# Patient Record
Sex: FEMALE | Race: WHITE | NOT HISPANIC OR LATINO | Employment: FULL TIME | URBAN - METROPOLITAN AREA
[De-identification: names, ages, dates, MRNs, and addresses within clinical notes are randomized per-mention and may not be internally consistent; named-entity substitution may affect disease eponyms.]

---

## 2017-02-24 ENCOUNTER — ALLSCRIPTS OFFICE VISIT (OUTPATIENT)
Dept: OTHER | Facility: OTHER | Age: 57
End: 2017-02-24

## 2017-02-24 LAB
FLUAV AG SPEC QL IA: POSITIVE
INFLUENZA B AG (HISTORICAL): NEGATIVE

## 2017-03-29 ENCOUNTER — LAB REQUISITION (OUTPATIENT)
Dept: LAB | Facility: HOSPITAL | Age: 57
End: 2017-03-29
Payer: COMMERCIAL

## 2017-03-29 DIAGNOSIS — Z11.51 ENCOUNTER FOR SCREENING FOR HUMAN PAPILLOMAVIRUS (HPV): ICD-10-CM

## 2017-03-29 DIAGNOSIS — Z01.419 ENCOUNTER FOR GYNECOLOGICAL EXAMINATION WITHOUT ABNORMAL FINDING: ICD-10-CM

## 2017-03-29 PROCEDURE — G0145 SCR C/V CYTO,THINLAYER,RESCR: HCPCS | Performed by: OBSTETRICS & GYNECOLOGY

## 2017-04-04 LAB
LAB AP GYN PRIMARY INTERPRETATION: NORMAL
Lab: NORMAL
PATH INTERP SPEC-IMP: NORMAL

## 2017-08-09 ENCOUNTER — ALLSCRIPTS OFFICE VISIT (OUTPATIENT)
Dept: OTHER | Facility: OTHER | Age: 57
End: 2017-08-09

## 2017-08-09 DIAGNOSIS — M72.2 PLANTAR FASCIAL FIBROMATOSIS: ICD-10-CM

## 2017-08-09 DIAGNOSIS — E78.00 PURE HYPERCHOLESTEROLEMIA: ICD-10-CM

## 2017-08-10 ENCOUNTER — APPOINTMENT (OUTPATIENT)
Dept: LAB | Facility: CLINIC | Age: 57
End: 2017-08-10
Payer: COMMERCIAL

## 2017-08-10 DIAGNOSIS — M72.2 PLANTAR FASCIAL FIBROMATOSIS: ICD-10-CM

## 2017-08-10 DIAGNOSIS — E78.00 PURE HYPERCHOLESTEROLEMIA: ICD-10-CM

## 2017-08-10 LAB
25(OH)D3 SERPL-MCNC: 29.2 NG/ML (ref 30–100)
ALBUMIN SERPL BCP-MCNC: 3.6 G/DL (ref 3.5–5)
ALP SERPL-CCNC: 72 U/L (ref 46–116)
ALT SERPL W P-5'-P-CCNC: 89 U/L (ref 12–78)
ANION GAP SERPL CALCULATED.3IONS-SCNC: 7 MMOL/L (ref 4–13)
AST SERPL W P-5'-P-CCNC: 64 U/L (ref 5–45)
BASOPHILS # BLD AUTO: 0.04 THOUSANDS/ΜL (ref 0–0.1)
BASOPHILS NFR BLD AUTO: 1 % (ref 0–1)
BILIRUB SERPL-MCNC: 0.73 MG/DL (ref 0.2–1)
BUN SERPL-MCNC: 16 MG/DL (ref 5–25)
CALCIUM SERPL-MCNC: 9.5 MG/DL (ref 8.3–10.1)
CHLORIDE SERPL-SCNC: 104 MMOL/L (ref 100–108)
CHOLEST SERPL-MCNC: 249 MG/DL (ref 50–200)
CO2 SERPL-SCNC: 28 MMOL/L (ref 21–32)
CREAT SERPL-MCNC: 0.88 MG/DL (ref 0.6–1.3)
EOSINOPHIL # BLD AUTO: 0.77 THOUSAND/ΜL (ref 0–0.61)
EOSINOPHIL NFR BLD AUTO: 12 % (ref 0–6)
ERYTHROCYTE [DISTWIDTH] IN BLOOD BY AUTOMATED COUNT: 15.3 % (ref 11.6–15.1)
ERYTHROCYTE [SEDIMENTATION RATE] IN BLOOD: 5 MM/HOUR (ref 0–20)
GFR SERPL CREATININE-BSD FRML MDRD: 75 ML/MIN/1.73SQ M
GLUCOSE P FAST SERPL-MCNC: 100 MG/DL (ref 65–99)
HCT VFR BLD AUTO: 42.5 % (ref 34.8–46.1)
HDLC SERPL-MCNC: 89 MG/DL (ref 40–60)
HGB BLD-MCNC: 14 G/DL (ref 11.5–15.4)
LDLC SERPL CALC-MCNC: 142 MG/DL (ref 0–100)
LYMPHOCYTES # BLD AUTO: 1.71 THOUSANDS/ΜL (ref 0.6–4.47)
LYMPHOCYTES NFR BLD AUTO: 26 % (ref 14–44)
MCH RBC QN AUTO: 28.9 PG (ref 26.8–34.3)
MCHC RBC AUTO-ENTMCNC: 32.9 G/DL (ref 31.4–37.4)
MCV RBC AUTO: 88 FL (ref 82–98)
MONOCYTES # BLD AUTO: 0.63 THOUSAND/ΜL (ref 0.17–1.22)
MONOCYTES NFR BLD AUTO: 10 % (ref 4–12)
NEUTROPHILS # BLD AUTO: 3.47 THOUSANDS/ΜL (ref 1.85–7.62)
NEUTS SEG NFR BLD AUTO: 51 % (ref 43–75)
NRBC BLD AUTO-RTO: 0 /100 WBCS
PLATELET # BLD AUTO: 248 THOUSANDS/UL (ref 149–390)
PMV BLD AUTO: 11.3 FL (ref 8.9–12.7)
POTASSIUM SERPL-SCNC: 4.7 MMOL/L (ref 3.5–5.3)
PROT SERPL-MCNC: 7.2 G/DL (ref 6.4–8.2)
RBC # BLD AUTO: 4.84 MILLION/UL (ref 3.81–5.12)
SODIUM SERPL-SCNC: 139 MMOL/L (ref 136–145)
TRIGL SERPL-MCNC: 91 MG/DL
TSH SERPL DL<=0.05 MIU/L-ACNC: 1.98 UIU/ML (ref 0.36–3.74)
WBC # BLD AUTO: 6.64 THOUSAND/UL (ref 4.31–10.16)

## 2017-08-10 PROCEDURE — 86376 MICROSOMAL ANTIBODY EACH: CPT

## 2017-08-10 PROCEDURE — 86618 LYME DISEASE ANTIBODY: CPT

## 2017-08-10 PROCEDURE — 86800 THYROGLOBULIN ANTIBODY: CPT

## 2017-08-10 PROCEDURE — 80053 COMPREHEN METABOLIC PANEL: CPT

## 2017-08-10 PROCEDURE — 36415 COLL VENOUS BLD VENIPUNCTURE: CPT

## 2017-08-10 PROCEDURE — 84443 ASSAY THYROID STIM HORMONE: CPT

## 2017-08-10 PROCEDURE — 82306 VITAMIN D 25 HYDROXY: CPT

## 2017-08-10 PROCEDURE — 85025 COMPLETE CBC W/AUTO DIFF WBC: CPT

## 2017-08-10 PROCEDURE — 80061 LIPID PANEL: CPT

## 2017-08-10 PROCEDURE — 85652 RBC SED RATE AUTOMATED: CPT

## 2017-08-11 LAB
B BURGDOR IGG SER IA-ACNC: 0.2
B BURGDOR IGM SER IA-ACNC: 0.26
THYROGLOB AB SERPL-ACNC: <1 IU/ML (ref 0–0.9)
THYROPEROXIDASE AB SERPL-ACNC: 24 IU/ML (ref 0–34)

## 2017-08-14 ENCOUNTER — GENERIC CONVERSION - ENCOUNTER (OUTPATIENT)
Dept: OTHER | Facility: OTHER | Age: 57
End: 2017-08-14

## 2017-08-17 ENCOUNTER — APPOINTMENT (OUTPATIENT)
Dept: LAB | Facility: CLINIC | Age: 57
End: 2017-08-17
Payer: COMMERCIAL

## 2017-08-17 DIAGNOSIS — R74.8 ABNORMAL LEVELS OF OTHER SERUM ENZYMES: ICD-10-CM

## 2017-08-17 LAB
ALBUMIN SERPL BCP-MCNC: 3.5 G/DL (ref 3.5–5)
ALP SERPL-CCNC: 75 U/L (ref 46–116)
ALT SERPL W P-5'-P-CCNC: 56 U/L (ref 12–78)
ANION GAP SERPL CALCULATED.3IONS-SCNC: 6 MMOL/L (ref 4–13)
AST SERPL W P-5'-P-CCNC: 29 U/L (ref 5–45)
BILIRUB SERPL-MCNC: 0.46 MG/DL (ref 0.2–1)
BUN SERPL-MCNC: 18 MG/DL (ref 5–25)
CALCIUM SERPL-MCNC: 9.2 MG/DL (ref 8.3–10.1)
CHLORIDE SERPL-SCNC: 107 MMOL/L (ref 100–108)
CO2 SERPL-SCNC: 27 MMOL/L (ref 21–32)
CREAT SERPL-MCNC: 0.88 MG/DL (ref 0.6–1.3)
GFR SERPL CREATININE-BSD FRML MDRD: 75 ML/MIN/1.73SQ M
GLUCOSE P FAST SERPL-MCNC: 97 MG/DL (ref 65–99)
POTASSIUM SERPL-SCNC: 4.4 MMOL/L (ref 3.5–5.3)
PROT SERPL-MCNC: 7 G/DL (ref 6.4–8.2)
SODIUM SERPL-SCNC: 140 MMOL/L (ref 136–145)

## 2017-08-17 PROCEDURE — 36415 COLL VENOUS BLD VENIPUNCTURE: CPT

## 2017-08-17 PROCEDURE — 80053 COMPREHEN METABOLIC PANEL: CPT

## 2017-08-18 ENCOUNTER — GENERIC CONVERSION - ENCOUNTER (OUTPATIENT)
Dept: OTHER | Facility: OTHER | Age: 57
End: 2017-08-18

## 2017-09-05 ENCOUNTER — TRANSCRIBE ORDERS (OUTPATIENT)
Dept: ADMINISTRATIVE | Facility: HOSPITAL | Age: 57
End: 2017-09-05

## 2017-09-05 DIAGNOSIS — Z12.31 ENCOUNTER FOR MAMMOGRAM TO ESTABLISH BASELINE MAMMOGRAM: Primary | ICD-10-CM

## 2017-09-21 ENCOUNTER — HOSPITAL ENCOUNTER (OUTPATIENT)
Dept: RADIOLOGY | Age: 57
Discharge: HOME | End: 2017-09-21
Payer: COMMERCIAL

## 2017-09-21 DIAGNOSIS — Z12.31 ENCOUNTER FOR MAMMOGRAM TO ESTABLISH BASELINE MAMMOGRAM: ICD-10-CM

## 2017-09-21 PROCEDURE — G0202 SCR MAMMO BI INCL CAD: HCPCS

## 2017-09-28 DIAGNOSIS — R74.8 ABNORMAL LEVELS OF OTHER SERUM ENZYMES: ICD-10-CM

## 2017-11-24 ENCOUNTER — ALLSCRIPTS OFFICE VISIT (OUTPATIENT)
Dept: OTHER | Facility: OTHER | Age: 57
End: 2017-11-24

## 2017-11-25 NOTE — PROGRESS NOTES
Assessment    1  Right rotator cuff tendinitis (726 10) (C70 73)    Plan  Right rotator cuff tendinitis    · Naproxen 500 MG Oral Tablet; TAKE 1 TABLET EVERY 12 HOURS AS NEEDED   · Do rotator cuff stretches twice a day   Hold each stretch for 30 seconds   Repeat 3 timeseach session ; Status:Complete;   Done: 64KXX0623   · We recommend that you use a towel to help stretch your rotator cuff muscles  Hold thisstretch for 10 seconds and do it 5 times in a row, 3 times a day ; Status:Complete;  Done: 83URA5672    Discussion/Summary    57F with:Right rotator cuff tendinitis- few weeks worth of symptoms without any history of trauma, exam consistent with rotator cuff pathologyexpected [id number] BID x 2 weekshandout given to patientPRN-RTC PRN  Chief Complaint    1  Shoulder Pain    History of Present Illness  HPI: Pt has been having right shoulder pain for the past month  Has had decreased ROM and has pain with wiping her bottom or zipping up her dress  The pain is achy, no numbness or tingling, 3/10  similar episodes in the past  Has not tried any meds because the pain is tolerable  trauma, new exercise regimen  Shoulder Pain:  Associated symptoms include decreased range of motion-- and-- instability, but-- no swelling,-- no clicking,-- no shoulder bruising,-- no redness,-- no warmth,-- no numbness in the arm,-- no weakness in the arm,-- no pain in the arm,-- no paresthesias,-- no pain in the neck,-- no chest pain,-- no pain in other joints,-- no fever,-- no localized rash-- and-- no generalized rash  Review of Systems   Constitutional: No fever, no chills, feels well, no tiredness, no recent weight gain or loss  Cardiovascular: no complaints of slow or fast heart rate, no chest pain, no palpitations, no leg claudication or lower extremity edema  Respiratory: no complaints of shortness of breath, no wheezing, no dyspnea on exertion, no orthopnea or PND    Gastrointestinal: no complaints of abdominal pain, no constipation, no nausea or diarrhea, no vomiting, no bloody stools  Active Problems  1  Abnormal liver enzymes (790 5) (R74 8)   2  Asthma (493 90) (J45 909)   3  Carpal tunnel syndrome (354 0) (G56 00)   4  Eczema (692 9) (L30 9)   5  Hypercholesterolemia (272 0) (E78 00)   6  Keratosis (701 1) (L57 0)   7  Rosacea (695 3) (L71 9)   8  Weight gain (783 1) (R63 5)    Past Medical History  1  History of Encounter for screening for malignant neoplasm of colon (V76 51) (Z12 11)   2  History of Mammogram   3  History of Pain in wrist, unspecified laterality (719 43) (M25 539)  Active Problems And Past Medical History Reviewed: The active problems and past medical history were reviewed and updated today  Family History  Mother    1  Family history of Lung Cancer (V16 1)  Son    2  Family history of Ulcerative Colitis    Social History   · Being A Social Drinker   · Never A Smoker    Surgical History    1  Contraceptives (V25 02)   2  Denied: History Of Prior Surgery    Current Meds   1  Advair Diskus 250-50 MCG/DOSE Inhalation Aerosol Powder Breath Activated; USE 1 INHALATION BY MOUTH TWICE DAILY; Therapy: 34PIR2676 to (Evaluate:80Awb1747)  Requested for: 28Oct2016; Last Rx:88Tgv9506 Ordered   2  Allegra Allergy 180 MG Oral Tablet; Therapy: (Recorded:47Sco3617) to Recorded   3  Montelukast Sodium 10 MG Oral Tablet; Take 1 tablet daily; Therapy: 37PGH5011 to (Last Rx:18Ciq3941)  Requested for: 44Pqa4301 Ordered   4  Ventolin  (90 Base) MCG/ACT Inhalation Aerosol Solution; INHALE 1 TO 2 PUFFS EVERY 4 TO 6 HOURS AS NEEDED; Therapy: 12MUI6766 to (Evaluate:83Rmr6012)  Requested for: 91ROW1983; Last Rx:66Ync2904 Ordered    The medication list was reviewed and updated today  Allergies  1   No Known Drug Allergies    Vitals   Recorded: 53NUF9087 02:58PM   Temperature 97 F   Heart Rate 84   Respiration 16   Systolic 192   Diastolic 64   Height 5 ft 4 in   Weight 164 lb    BMI Calculated 28 15   BSA Calculated 1 8       Physical Exam   Constitutional  General appearance: No acute distress, well appearing and well nourished  Pulmonary  Respiratory effort: No increased work of breathing or signs of respiratory distress  Auscultation of lungs: Clear to auscultation  Cardiovascular  Auscultation of heart: Normal rate and rhythm, normal S1 and S2, without murmurs  Musculoskeletal  Gait and station: Normal    Inspection/palpation of joints, bones, and muscles: Abnormal  -- (intact active and passive ROM, though does have pain at the end of her ROM of the right shoulder) Appearance - normal  Palpation - normal except as noted: tenderness, but-- no increased warmth,-- no click-- and-- no crepitus    Additional Exam:  Negative Neers, Adams, cross-arm test, apprehension test  Positive empty can test, Positive Apley scratch test         Signatures   Electronically signed by : DARSHANA Chapman ; Nov 24 2017  3:28PM EST                       (Author)

## 2018-01-11 NOTE — RESULT NOTES
Discussion/Summary   Labs are normal including blood sugar, kidney and liver function ! Great  Verified Results  (1) COMPREHENSIVE METABOLIC PANEL 58GBV1740 86:12KX Ewa Trejo Order Number: [id number]     Test Name Result Flag Reference   SODIUM 140 mmol/L  136-145   POTASSIUM 4 4 mmol/L  3 5-5 3   CHLORIDE 107 mmol/L  100-108   CARBON DIOXIDE 27 mmol/L  21-32   ANION GAP (CALC) 6 mmol/L  4-13   BLOOD UREA NITROGEN 18 mg/dL  5-25   CREATININE 0 88 mg/dL  0 60-1 30   Standardized to IDMS reference method   CALCIUM 9 2 mg/dL  8 3-10 1   BILI, TOTAL 0 46 mg/dL  0 20-1 00   ALK PHOSPHATAS 75 U/L     ALT (SGPT) 56 U/L  12-78   Specimen collection should occur prior to Sulfasalazine and/or Sulfapyridine administration due to the potential for falsely depressed results  AST(SGOT) 29 U/L  5-45   Specimen collection should occur prior to Sulfasalazine administration due to the potential for falsely depressed results  ALBUMIN 3 5 g/dL  3 5-5 0   TOTAL PROTEIN 7 0 g/dL  6 4-8 2   eGFR 75 ml/min/1 73sq m     [facility] Disease Education Program recommendations are as follows:  GFR calculation is accurate only with a steady state creatinine  Chronic Kidney disease less than 60 ml/min/1 73 sq  meters  Kidney failure less than 15 ml/min/1 73 sq  meters  GLUCOSE FASTING 97 mg/dL  65-99   Specimen collection should occur prior to Sulfasalazine administration due to the potential for falsely depressed results  Specimen collection should occur prior to Sulfapyridine administration due to the potential for falsely elevated results

## 2018-01-12 NOTE — RESULT NOTES
Verified Results  (1) TSH 41HTB0386 07:17AM Vee Welch   Patients undergoing fluorescein dye angiography may retain small amounts of fluorescein in the body for 48-72 hours post procedure  Samples containing fluorescein can produce falsely depressed TSH values  If the patient had this procedure,a specimen should be resubmitted post fluorescein clearance          The recommended reference ranges for TSH during pregnancy are as follows:  First trimester 0 1 to 2 5 uIU/mL  Second trimester  0 2 to 3 0 uIU/mL  Third trimester 0 3 to 3 0 uIU/m     Test Name Result Flag Reference   TSH 2 520 uIU/mL  0 358-3 740

## 2018-01-12 NOTE — RESULT NOTES
Verified Results  (1) CBC/PLT/DIFF 10Aug2017 07:36AM Donna Molina    Order Number: [id number]     Test Name Result Flag Reference   WBC COUNT 6 64 Thousand/uL  4 31-10 16   RBC COUNT 4 84 Million/uL  3 81-5 12   HEMOGLOBIN 14 0 g/dL  11 5-15 4   HEMATOCRIT 42 5 %  34 8-46  1   MCV 88 fL  82-98   MCH 28 9 pg  26 8-34 3   MCHC 32 9 g/dL  31 4-37 4   RDW 15 3 % H 11 6-15 1   MPV 11 3 fL  8 9-12 7   PLATELET COUNT 807 Thousands/uL  149-390   nRBC AUTOMATED 0 /100 WBCs     NEUTROPHILS RELATIVE PERCENT 51 %  43-75   LYMPHOCYTES RELATIVE PERCENT 26 %  14-44   MONOCYTES RELATIVE PERCENT 10 %  4-12   EOSINOPHILS RELATIVE PERCENT 12 % H 0-6   BASOPHILS RELATIVE PERCENT 1 %  0-1   NEUTROPHILS ABSOLUTE COUNT 3 47 Thousands/? ??L  1 85-7 62   LYMPHOCYTES ABSOLUTE COUNT 1 71 Thousands/? ??L  0 60-4 47   MONOCYTES ABSOLUTE COUNT 0 63 Thousand/? ??L  0 17-1 22   EOSINOPHILS ABSOLUTE COUNT 0 77 Thousand/? ??L H 0 00-0 61   BASOPHILS ABSOLUTE COUNT 0 04 Thousands/? ??L  0 00-0 10     (1) COMPREHENSIVE METABOLIC PANEL 44MIO3688 45:01HW Karol Smith Order Number: [id number]     Test Name Result Flag Reference   SODIUM 139 mmol/L  136-145   POTASSIUM 4 7 mmol/L  3 5-5 3   CHLORIDE 104 mmol/L  100-108   CARBON DIOXIDE 28 mmol/L  21-32   ANION GAP (CALC) 7 mmol/L  4-13   BLOOD UREA NITROGEN 16 mg/dL  5-25   CREATININE 0 88 mg/dL  0 60-1 30   Standardized to IDMS reference method   CALCIUM 9 5 mg/dL  8 3-10 1   BILI, TOTAL 0 73 mg/dL  0 20-1 00   ALK PHOSPHATAS 72 U/L     ALT (SGPT) 89 U/L H 12-78   Specimen collection should occur prior to Sulfasalazine and/or Sulfapyridine administration due to the potential for falsely depressed results  AST(SGOT) 64 U/L H 5-45   Specimen collection should occur prior to Sulfasalazine administration due to the potential for falsely depressed results     ALBUMIN 3 6 g/dL  3 5-5 0   TOTAL PROTEIN 7 2 g/dL  6 4-8 2   eGFR 75 ml/min/1 73sq m     [facility] Disease Education Program recommendations are as follows:  GFR calculation is accurate only with a steady state creatinine  Chronic Kidney disease less than 60 ml/min/1 73 sq  meters  Kidney failure less than 15 ml/min/1 73 sq  meters  GLUCOSE FASTING 100 mg/dL H 65-99   Specimen collection should occur prior to Sulfasalazine administration due to the potential for falsely depressed results  Specimen collection should occur prior to Sulfapyridine administration due to the potential for falsely elevated results  (1) LIPID PANEL, FASTING 10Aug2017 07:36AM Shahida Jacques   TW Order Number: [id number]     Test Name Result Flag Reference   CHOLESTEROL 249 mg/dL H    HDL,DIRECT 89 mg/dL H 40-60   Specimen collection should occur prior to Metamizole administration due to the potential for falsley depressed results  LDL CHOLESTEROL CALCULATED 142 mg/dL H 0-100   Triglyceride:        Normal ??? ??? ??? ??? ??? ??? ??? <150 mg/dl   ??? ??? ???Borderline High ??? ??? 150-199 mg/dl   ??? ??? ? ?? High ??? ??? ??? ??? ??? ??? ??? 200-499 mg/dl   ??? ??? ? ??Very High ??? ??? ??? ??? ??? >499 mg/dl      Cholesterol:       Desirable ??? ??? ??? ??? <200 mg/dl   ??? ??? Borderline High ??? 200-239 mg/dl   ??? ??? High ??? ??? ??? ??? ??? ??? >239 mg/dl      HDL Cholesterol:       High ??? ???>59 mg/dL   ??? ??? Low ??? ??? <41 mg/dL      This screening LDL is a calculated result  It does not have the accuracy of the Direct Measured LDL in the monitoring of patients with hyperlipidemia and/or statin therapy  Direct Measure LDL (WOV149) must be ordered separately in these patients  TRIGLYCERIDES 91 mg/dL  <=150   Specimen collection should occur prior to N-Acetylcysteine or Metamizole administration due to the potential for falsely depressed results       (1) TSH 61IZP0940 07:36AM Shahida Jacques   TW Order Number: [id number]     Test Name Result Flag Reference   TSH 1 980 uIU/mL  0 358-3 740   Patients undergoing fluorescein dye angiography may retain small amounts of fluorescein in the body for 48-72 hours post procedure  Samples containing fluorescein can produce falsely depressed TSH values  If the patient had this procedure,a specimen should be resubmitted post fluorescein clearance  The recommended reference ranges for TSH during pregnancy are as follows:  First trimester 0 1 to 2 5 uIU/mL  Second trimester  0 2 to 3 0 uIU/mL  Third trimester 0 3 to 3 0 uIU/m     (1) VITAMIN D 25-HYDROXY 10Aug2017 07:36AM [employer] Order Number: [id number]     Test Name Result Flag Reference   VIT D 25-HYDROX 29 2 ng/mL L 30 0-100 0   This assay is a certified procedure of the CDC Vitamin D Standardization Certification Program (VDSCP)     Deficiency <20ng/ml   Insufficiency 20-30ng/ml   Sufficient  ng/ml     *Patients undergoing fluorescein dye angiography may retain small amounts of fluorescein in the body for 48-72 hours post procedure  Samples containing fluorescein can produce falsely elevated Vitamin D values  If the patient had this procedure, a specimen should be resubmitted post fluorescein clearance  (1) SED RATE 10Aug2017 07:36AM [employer] Order Number: [id number]     Test Name Result Flag Reference   SED RATE 5 mm/hour  0-20     (1) LYME ANTIBODY PROFILE [facility] TO WESTERN BLOT 10Aug2017 07:36AM Inocencio Miguel A Order Number: [id number]     Test Name Result Flag Reference   LYME IGG 0 20  0 00-0 79   NEGATIVE(0 00-0 79)-Absence of detectable Borrelia IgG Antibodies  A negative result does not exclude the possibility of Borrelia infection  If early Lyme disease is suspected,a second sample should be collected & tested 4 weeks after initial testing  LYME IGM 0 26  0 00-0 79   NEGATIVE (0 00-0 79)-Absence of detectable Borrelia IgM antibodies   A negative result does not exclude the possibility of Borrelia infection  If early lyme disease is suspected, a second sample should be collected & tested 4 weeks after initial testing      (1) THYROID ANTIBODIES PANEL 72Xzg6203 07:36AM Regan Kirkland    Order Number: [id number]     Test Name Result Flag Reference   THYROGLOB AB <1 0 IU/mL  0 0 - 0 9   Thyroglobulin Antibody measured by [facility] Methodology    Performed at:  [facility] [address]  [id number]  : Sara Trores MD, Phone:  [phone]   [facility] 24 IU/mL  0 - 34   Performed at:  [facility] [address]  [id number]  : Sara Torres MD, Phone:  [phone]       Plan  Abnormal liver enzymes    · (1) COMPREHENSIVE METABOLIC PANEL; Status:Active;  Requested ZWD:[id number];

## 2018-01-13 VITALS
HEART RATE: 84 BPM | RESPIRATION RATE: 16 BRPM | TEMPERATURE: 97 F | SYSTOLIC BLOOD PRESSURE: 110 MMHG | DIASTOLIC BLOOD PRESSURE: 64 MMHG | HEIGHT: 64 IN | WEIGHT: 164 LBS | BODY MASS INDEX: 28 KG/M2

## 2018-01-14 VITALS
BODY MASS INDEX: 28.51 KG/M2 | RESPIRATION RATE: 16 BRPM | SYSTOLIC BLOOD PRESSURE: 118 MMHG | HEART RATE: 88 BPM | WEIGHT: 167 LBS | HEIGHT: 64 IN | DIASTOLIC BLOOD PRESSURE: 64 MMHG | TEMPERATURE: 98.6 F

## 2018-01-14 VITALS
RESPIRATION RATE: 16 BRPM | BODY MASS INDEX: 26.73 KG/M2 | HEART RATE: 76 BPM | TEMPERATURE: 100.7 F | SYSTOLIC BLOOD PRESSURE: 104 MMHG | HEIGHT: 64 IN | WEIGHT: 156.6 LBS | DIASTOLIC BLOOD PRESSURE: 62 MMHG

## 2018-01-15 ENCOUNTER — GENERIC CONVERSION - ENCOUNTER (OUTPATIENT)
Dept: FAMILY MEDICINE CLINIC | Facility: CLINIC | Age: 58
End: 2018-01-15

## 2018-03-23 ENCOUNTER — OFFICE VISIT (OUTPATIENT)
Dept: FAMILY MEDICINE CLINIC | Facility: CLINIC | Age: 58
End: 2018-03-23
Payer: COMMERCIAL

## 2018-03-23 VITALS
BODY MASS INDEX: 25.49 KG/M2 | HEIGHT: 65 IN | HEART RATE: 78 BPM | WEIGHT: 153 LBS | DIASTOLIC BLOOD PRESSURE: 64 MMHG | RESPIRATION RATE: 16 BRPM | SYSTOLIC BLOOD PRESSURE: 102 MMHG | TEMPERATURE: 96.3 F

## 2018-03-23 DIAGNOSIS — J01.00 ACUTE NON-RECURRENT MAXILLARY SINUSITIS: Primary | ICD-10-CM

## 2018-03-23 PROCEDURE — 99213 OFFICE O/P EST LOW 20 MIN: CPT | Performed by: FAMILY MEDICINE

## 2018-03-23 RX ORDER — FLUTICASONE PROPIONATE 50 MCG
2 SPRAY, SUSPENSION (ML) NASAL DAILY
Qty: 16 G | Refills: 3 | Status: SHIPPED | OUTPATIENT
Start: 2018-03-23 | End: 2019-03-05 | Stop reason: ALTCHOICE

## 2018-03-23 RX ORDER — AZITHROMYCIN 250 MG/1
TABLET, FILM COATED ORAL
Qty: 6 TABLET | Refills: 0 | Status: SHIPPED | OUTPATIENT
Start: 2018-03-23 | End: 2018-03-27

## 2018-03-23 RX ORDER — MONTELUKAST SODIUM 10 MG/1
1 TABLET ORAL DAILY
COMMUNITY
Start: 2013-05-23 | End: 2018-09-06 | Stop reason: SDUPTHER

## 2018-03-23 RX ORDER — FEXOFENADINE HCL 180 MG/1
1 TABLET ORAL AS NEEDED
COMMUNITY

## 2018-03-23 RX ORDER — ALBUTEROL SULFATE 90 UG/1
1-2 AEROSOL, METERED RESPIRATORY (INHALATION) AS NEEDED
COMMUNITY
Start: 2011-10-10 | End: 2019-03-05 | Stop reason: SDUPTHER

## 2018-03-23 RX ORDER — FEXOFENADINE HCL AND PSEUDOEPHEDRINE HCI 60; 120 MG/1; MG/1
1 TABLET, EXTENDED RELEASE ORAL AS NEEDED
COMMUNITY
End: 2019-03-05 | Stop reason: ALTCHOICE

## 2018-03-23 NOTE — PROGRESS NOTES
Assessment/Plan:         Diagnoses and all orders for this visit:    Acute non-recurrent maxillary sinusitis  -     azithromycin (ZITHROMAX) 250 mg tablet; 2 tablets day 1  1 tablet daily for days 2 through 5  -     fluticasone (FLONASE) 50 mcg/act nasal spray; 2 sprays into each nostril daily    Other orders  -     fluticasone-salmeterol (ADVAIR DISKUS) 250-50 mcg/dose inhaler; Inhale 1 actuation as needed  -     fexofenadine (ALLEGRA) 180 MG tablet; Take 1 tablet by mouth as needed  -     fexofenadine-pseudoephedrine (ALLEGRA-D)  MG per tablet; Take 1 tablet by mouth as needed  -     montelukast (SINGULAIR) 10 mg tablet; Take 1 tablet by mouth daily  -     albuterol (VENTOLIN HFA) 90 mcg/act inhaler; Inhale 1-2 puffs as needed         start antibiotics  Steroid nasal spray  Continue with Allegra  Recheck as needed  Advised to call if any changes  Patient ID: Jasmin Lemus is a 56 y o  female  One-week history of persistent nasal congestion with sinus pressure and pain  No cough  She is currently on Allegra daily along with Singulair for her asthma  Not using any inhalers at this time  Prior allergy testing  The following portions of the patient's history were reviewed and updated as appropriate: current medications, past family history, past medical history, past social history, past surgical history and problem list     Review of Systems   Constitutional: Negative for chills and fever  HENT: Positive for rhinorrhea  Negative for ear pain, postnasal drip and sore throat  See HPI   Respiratory: Negative for shortness of breath and wheezing  Gastrointestinal: Negative for diarrhea, nausea and vomiting  Musculoskeletal: Negative for myalgias  Neurological: Negative for dizziness and headaches           Objective:      /64 (BP Location: Left arm, Patient Position: Sitting, Cuff Size: Large)   Pulse 78   Temp (!) 96 3 °F (35 7 °C)   Resp 16   Ht 5' 5" (1 651 m) Wt 69 4 kg (153 lb)   Breastfeeding? No   BMI 25 46 kg/m²          Physical Exam   Constitutional: She appears well-developed and well-nourished  No distress  HENT:   Right Ear: Tympanic membrane normal    Left Ear: Tympanic membrane normal    Nose: Right sinus exhibits maxillary sinus tenderness  Right sinus exhibits no frontal sinus tenderness  Left sinus exhibits maxillary sinus tenderness  Left sinus exhibits no frontal sinus tenderness  Mouth/Throat: Uvula is midline and mucous membranes are normal    Eyes: Conjunctivae are normal  Pupils are equal, round, and reactive to light  No scleral icterus  Neck: Neck supple  No tracheal deviation present  No thyromegaly present  Cardiovascular: Normal rate, regular rhythm and normal heart sounds  Exam reveals no gallop  No murmur heard  Pulmonary/Chest: Effort normal and breath sounds normal  She has no wheezes  She has no rales  Lymphadenopathy:     She has no cervical adenopathy  Neurological: She is alert  Skin: No rash noted  Nursing note and vitals reviewed

## 2018-04-02 PROCEDURE — G0145 SCR C/V CYTO,THINLAYER,RESCR: HCPCS | Performed by: OBSTETRICS & GYNECOLOGY

## 2018-04-02 PROCEDURE — 87624 HPV HI-RISK TYP POOLED RSLT: CPT | Performed by: OBSTETRICS & GYNECOLOGY

## 2018-04-03 ENCOUNTER — LAB REQUISITION (OUTPATIENT)
Dept: LAB | Facility: HOSPITAL | Age: 58
End: 2018-04-03
Payer: COMMERCIAL

## 2018-04-03 DIAGNOSIS — Z01.419 ENCOUNTER FOR GYNECOLOGICAL EXAMINATION WITHOUT ABNORMAL FINDING: ICD-10-CM

## 2018-04-03 DIAGNOSIS — Z11.51 ENCOUNTER FOR SCREENING FOR HUMAN PAPILLOMAVIRUS (HPV): ICD-10-CM

## 2018-04-05 LAB — HPV RRNA GENITAL QL NAA+PROBE: NORMAL

## 2018-04-06 LAB
LAB AP GYN PRIMARY INTERPRETATION: NORMAL
Lab: NORMAL

## 2018-09-06 DIAGNOSIS — J45.40 MODERATE PERSISTENT ASTHMA WITHOUT COMPLICATION: Primary | ICD-10-CM

## 2018-09-06 RX ORDER — MONTELUKAST SODIUM 10 MG/1
TABLET ORAL
Qty: 90 TABLET | Refills: 3 | Status: SHIPPED | OUTPATIENT
Start: 2018-09-06 | End: 2019-03-05 | Stop reason: SDUPTHER

## 2018-09-27 ENCOUNTER — HOSPITAL ENCOUNTER (OUTPATIENT)
Dept: RADIOLOGY | Age: 58
Discharge: HOME | End: 2018-09-27
Payer: COMMERCIAL

## 2018-09-27 DIAGNOSIS — Z12.31 ENCOUNTER FOR SCREENING MAMMOGRAM FOR MALIGNANT NEOPLASM OF BREAST: ICD-10-CM

## 2018-09-27 PROCEDURE — 77067 SCR MAMMO BI INCL CAD: CPT

## 2018-10-30 ENCOUNTER — TELEPHONE (OUTPATIENT)
Dept: FAMILY MEDICINE CLINIC | Facility: CLINIC | Age: 58
End: 2018-10-30

## 2018-10-30 DIAGNOSIS — R74.8 ABNORMAL LIVER ENZYMES: ICD-10-CM

## 2018-10-30 DIAGNOSIS — Z12.11 SCREEN FOR COLON CANCER: Primary | ICD-10-CM

## 2018-10-30 DIAGNOSIS — E78.00 HYPERCHOLESTEROLEMIA: Primary | ICD-10-CM

## 2018-10-30 PROBLEM — Z86.010 HX OF COLONIC POLYPS: Status: ACTIVE | Noted: 2018-10-30

## 2018-10-30 PROBLEM — Z86.0100 HX OF COLONIC POLYPS: Status: ACTIVE | Noted: 2018-10-30

## 2018-10-30 NOTE — TELEPHONE ENCOUNTER
Pt has an appt scheduled for the end of December and is requesting a script for routine bw   Just put in chart

## 2018-11-12 ENCOUNTER — ANESTHESIA EVENT (OUTPATIENT)
Dept: PERIOP | Facility: AMBULARY SURGERY CENTER | Age: 58
End: 2018-11-12
Payer: COMMERCIAL

## 2018-11-16 ENCOUNTER — APPOINTMENT (OUTPATIENT)
Dept: LAB | Facility: CLINIC | Age: 58
End: 2018-11-16
Payer: COMMERCIAL

## 2018-11-16 LAB
ALBUMIN SERPL BCP-MCNC: 3.9 G/DL (ref 3.5–5)
ALP SERPL-CCNC: 84 U/L (ref 46–116)
ALT SERPL W P-5'-P-CCNC: 42 U/L (ref 12–78)
ANION GAP SERPL CALCULATED.3IONS-SCNC: 4 MMOL/L (ref 4–13)
AST SERPL W P-5'-P-CCNC: 30 U/L (ref 5–45)
BASOPHILS # BLD AUTO: 0.08 THOUSANDS/ΜL (ref 0–0.1)
BASOPHILS NFR BLD AUTO: 2 % (ref 0–1)
BILIRUB SERPL-MCNC: 0.87 MG/DL (ref 0.2–1)
BUN SERPL-MCNC: 11 MG/DL (ref 5–25)
CALCIUM SERPL-MCNC: 8.9 MG/DL (ref 8.3–10.1)
CHLORIDE SERPL-SCNC: 106 MMOL/L (ref 100–108)
CHOLEST SERPL-MCNC: 195 MG/DL (ref 50–200)
CO2 SERPL-SCNC: 29 MMOL/L (ref 21–32)
CREAT SERPL-MCNC: 0.94 MG/DL (ref 0.6–1.3)
EOSINOPHIL # BLD AUTO: 0.65 THOUSAND/ΜL (ref 0–0.61)
EOSINOPHIL NFR BLD AUTO: 13 % (ref 0–6)
ERYTHROCYTE [DISTWIDTH] IN BLOOD BY AUTOMATED COUNT: 14.6 % (ref 11.6–15.1)
GFR SERPL CREATININE-BSD FRML MDRD: 68 ML/MIN/1.73SQ M
GLUCOSE P FAST SERPL-MCNC: 94 MG/DL (ref 65–99)
HCT VFR BLD AUTO: 42.3 % (ref 34.8–46.1)
HDLC SERPL-MCNC: 80 MG/DL (ref 40–60)
HGB BLD-MCNC: 13.3 G/DL (ref 11.5–15.4)
IMM GRANULOCYTES # BLD AUTO: 0.01 THOUSAND/UL (ref 0–0.2)
IMM GRANULOCYTES NFR BLD AUTO: 0 % (ref 0–2)
LDLC SERPL CALC-MCNC: 105 MG/DL (ref 0–100)
LYMPHOCYTES # BLD AUTO: 1.99 THOUSANDS/ΜL (ref 0.6–4.47)
LYMPHOCYTES NFR BLD AUTO: 38 % (ref 14–44)
MCH RBC QN AUTO: 28.2 PG (ref 26.8–34.3)
MCHC RBC AUTO-ENTMCNC: 31.4 G/DL (ref 31.4–37.4)
MCV RBC AUTO: 90 FL (ref 82–98)
MONOCYTES # BLD AUTO: 0.4 THOUSAND/ΜL (ref 0.17–1.22)
MONOCYTES NFR BLD AUTO: 8 % (ref 4–12)
NEUTROPHILS # BLD AUTO: 2.08 THOUSANDS/ΜL (ref 1.85–7.62)
NEUTS SEG NFR BLD AUTO: 39 % (ref 43–75)
NONHDLC SERPL-MCNC: 115 MG/DL
NRBC BLD AUTO-RTO: 0 /100 WBCS
PLATELET # BLD AUTO: 278 THOUSANDS/UL (ref 149–390)
PMV BLD AUTO: 10.6 FL (ref 8.9–12.7)
POTASSIUM SERPL-SCNC: 4.1 MMOL/L (ref 3.5–5.3)
PROT SERPL-MCNC: 7.1 G/DL (ref 6.4–8.2)
RBC # BLD AUTO: 4.71 MILLION/UL (ref 3.81–5.12)
SODIUM SERPL-SCNC: 139 MMOL/L (ref 136–145)
TRIGL SERPL-MCNC: 52 MG/DL
TSH SERPL DL<=0.05 MIU/L-ACNC: 1.55 UIU/ML (ref 0.36–3.74)
WBC # BLD AUTO: 5.21 THOUSAND/UL (ref 4.31–10.16)

## 2018-11-16 PROCEDURE — 36415 COLL VENOUS BLD VENIPUNCTURE: CPT | Performed by: FAMILY MEDICINE

## 2018-11-16 PROCEDURE — 80061 LIPID PANEL: CPT | Performed by: FAMILY MEDICINE

## 2018-11-16 PROCEDURE — 85025 COMPLETE CBC W/AUTO DIFF WBC: CPT | Performed by: FAMILY MEDICINE

## 2018-11-16 PROCEDURE — 80053 COMPREHEN METABOLIC PANEL: CPT | Performed by: FAMILY MEDICINE

## 2018-11-16 PROCEDURE — 84443 ASSAY THYROID STIM HORMONE: CPT | Performed by: FAMILY MEDICINE

## 2018-11-26 ENCOUNTER — ANESTHESIA (OUTPATIENT)
Dept: PERIOP | Facility: AMBULARY SURGERY CENTER | Age: 58
End: 2018-11-26
Payer: COMMERCIAL

## 2018-11-26 ENCOUNTER — HOSPITAL ENCOUNTER (OUTPATIENT)
Facility: AMBULARY SURGERY CENTER | Age: 58
Setting detail: OUTPATIENT SURGERY
Discharge: HOME | End: 2018-11-26
Attending: INTERNAL MEDICINE | Admitting: INTERNAL MEDICINE
Payer: COMMERCIAL

## 2018-11-26 VITALS
HEIGHT: 64 IN | OXYGEN SATURATION: 98 % | RESPIRATION RATE: 18 BRPM | SYSTOLIC BLOOD PRESSURE: 113 MMHG | DIASTOLIC BLOOD PRESSURE: 62 MMHG | BODY MASS INDEX: 26.46 KG/M2 | TEMPERATURE: 98.2 F | HEART RATE: 68 BPM | WEIGHT: 155 LBS

## 2018-11-26 PROCEDURE — G0105 COLORECTAL SCRN; HI RISK IND: HCPCS | Performed by: INTERNAL MEDICINE

## 2018-11-26 RX ORDER — SODIUM CHLORIDE 9 MG/ML
125 INJECTION, SOLUTION INTRAVENOUS CONTINUOUS
Status: DISCONTINUED | OUTPATIENT
Start: 2018-11-26 | End: 2018-11-26 | Stop reason: HOSPADM

## 2018-11-26 RX ORDER — PROPOFOL 10 MG/ML
INJECTION, EMULSION INTRAVENOUS AS NEEDED
Status: DISCONTINUED | OUTPATIENT
Start: 2018-11-26 | End: 2018-11-26 | Stop reason: SURG

## 2018-11-26 RX ADMIN — PROPOFOL 80 MG: 10 INJECTION, EMULSION INTRAVENOUS at 15:09

## 2018-11-26 RX ADMIN — PROPOFOL 50 MG: 10 INJECTION, EMULSION INTRAVENOUS at 15:16

## 2018-11-26 RX ADMIN — PROPOFOL 50 MG: 10 INJECTION, EMULSION INTRAVENOUS at 15:14

## 2018-11-26 RX ADMIN — PROPOFOL 20 MG: 10 INJECTION, EMULSION INTRAVENOUS at 15:11

## 2018-11-26 RX ADMIN — SODIUM CHLORIDE: 0.9 INJECTION, SOLUTION INTRAVENOUS at 14:43

## 2018-11-26 NOTE — ANESTHESIA POSTPROCEDURE EVALUATION
Post-Op Assessment Note      CV Status:  Stable    Mental Status:  Alert and awake    Hydration Status:  Euvolemic    PONV Controlled:  Controlled    Airway Patency:  Patent    Post Op Vitals Reviewed:  Yes              /63 (11/26/18 1519)    Temp      Pulse 71 (11/26/18 1519)   Resp 15 (11/26/18 1519)    SpO2 98 % (11/26/18 1519)

## 2018-11-26 NOTE — ANESTHESIA PREPROCEDURE EVALUATION
Review of Systems/Medical History  Patient summary reviewed  Chart reviewed  No history of anesthetic complications     Cardiovascular  Hyperlipidemia,    Pulmonary  Asthma ,        GI/Hepatic  Negative GI/hepatic ROS               Endo/Other     GYN       Hematology   Musculoskeletal       Neurology   Psychology           Physical Exam    Airway    Mallampati score: II  TM Distance: >3 FB  Neck ROM: full     Dental       Cardiovascular      Pulmonary      Other Findings        Anesthesia Plan  ASA Score- 2     Anesthesia Type- IV sedation with anesthesia with ASA Monitors  Additional Monitors:   Airway Plan:         Plan Factors-    Induction- intravenous  Postoperative Plan-     Informed Consent- Anesthetic plan and risks discussed with patient  I personally reviewed this patient with the CRNA  Discussed and agreed on the Anesthesia Plan with the CRNA  Lizeth Man

## 2018-11-26 NOTE — OP NOTE
COLONOSCOPY    PROCEDURE: Colonoscopy    INDICATIONS: Screening for Colon Cancer    POST-OP DIAGNOSIS: See the impression below    SEDATION: Monitored anesthesia care, check anesthesia records    PHYSICAL EXAM:    /58   Pulse 74   Temp 98 2 °F (36 8 °C)   Resp 18   Ht 5' 4" (1 626 m)   Wt 70 3 kg (155 lb)   SpO2 96%   BMI 26 61 kg/m²   Body mass index is 26 61 kg/m²  General: NAD  Heart: S1 & S2 normal, RRR  Lungs: CTA, No rales or rhonchi  Abdomen: Soft, nontender, nondistended, good bowel sounds    CONSENT:  Informed consent was obtained for the procedure, including sedation after explaining the risks and benefits of the procedure  Risks including but not limited to bleeding, perforation, infection, aspiration were discussed in detail  Also explained about less than 100%$ sensitivity with the exam and other alternatives  PREPARATION:   EKG tracing, pulse oximetry, blood pressure were monitored throughout the procedure  Patient was identified by myself both verbally and by visual inspection of ID band  DESCRIPTION:   Patient was placed in the left lateral decubitus position and was sedated with the above medication  Digital rectal examination was performed  The colonoscope was introduced in to the anal canal and advanced up to cecum, which was identified by the appendiceal orifice and IC valve  A careful inspection was made as the colonoscope was withdrawn, including a retroflexed view of the rectum; findings and interventions are described below  Appropriate photodocumentation was obtained  The quality of the colonic preparation was adequate  FINDINGS:    1  Cecum and ileocecal valve-normal mucosa    2  The rest of the colon mucosa is normal         IMPRESSIONS:      As above    RECOMMENDATIONS:    Check pathology    COMPLICATIONS:  None; patient tolerated the procedure well      DISPOSITION: PACU           CONDITION: Stable

## 2019-03-05 ENCOUNTER — OFFICE VISIT (OUTPATIENT)
Dept: FAMILY MEDICINE CLINIC | Facility: CLINIC | Age: 59
End: 2019-03-05
Payer: COMMERCIAL

## 2019-03-05 VITALS
TEMPERATURE: 97.6 F | SYSTOLIC BLOOD PRESSURE: 112 MMHG | WEIGHT: 171 LBS | HEIGHT: 65 IN | HEART RATE: 78 BPM | BODY MASS INDEX: 28.49 KG/M2 | DIASTOLIC BLOOD PRESSURE: 64 MMHG | RESPIRATION RATE: 16 BRPM

## 2019-03-05 DIAGNOSIS — Z13.1 SCREENING FOR DIABETES MELLITUS: ICD-10-CM

## 2019-03-05 DIAGNOSIS — R63.5 WEIGHT GAIN: ICD-10-CM

## 2019-03-05 DIAGNOSIS — J45.40 MODERATE PERSISTENT ASTHMA WITHOUT COMPLICATION: ICD-10-CM

## 2019-03-05 DIAGNOSIS — Z11.59 NEED FOR HEPATITIS C SCREENING TEST: ICD-10-CM

## 2019-03-05 DIAGNOSIS — Z00.00 WELL ADULT EXAM: Primary | ICD-10-CM

## 2019-03-05 DIAGNOSIS — J45.20 MILD INTERMITTENT ASTHMA, UNSPECIFIED WHETHER COMPLICATED: ICD-10-CM

## 2019-03-05 DIAGNOSIS — E78.00 HYPERCHOLESTEROLEMIA: ICD-10-CM

## 2019-03-05 DIAGNOSIS — Z11.1 SCREENING FOR TUBERCULOSIS: ICD-10-CM

## 2019-03-05 PROBLEM — R74.8 ABNORMAL LIVER ENZYMES: Status: RESOLVED | Noted: 2017-08-14 | Resolved: 2019-03-05

## 2019-03-05 PROCEDURE — 99396 PREV VISIT EST AGE 40-64: CPT | Performed by: NURSE PRACTITIONER

## 2019-03-05 RX ORDER — ALBUTEROL SULFATE 90 UG/1
1-2 AEROSOL, METERED RESPIRATORY (INHALATION) EVERY 4 HOURS PRN
Qty: 2 INHALER | Refills: 5 | Status: SHIPPED | OUTPATIENT
Start: 2019-03-05 | End: 2020-03-07

## 2019-03-05 RX ORDER — MONTELUKAST SODIUM 10 MG/1
10 TABLET ORAL DAILY
Qty: 30 TABLET | Refills: 11 | Status: SHIPPED | OUTPATIENT
Start: 2019-03-05 | End: 2019-09-02 | Stop reason: SDUPTHER

## 2019-03-05 NOTE — PROGRESS NOTES
Assessment/Plan:         Problem List Items Addressed This Visit     Asthma + allergic rhinitis  --Well controlled on current regimen with no recent flares  Refills given  Relevant Medications    montelukast (SINGULAIR) 10 mg tablet    fluticasone-salmeterol (ADVAIR DISKUS) 250-50 mcg/dose inhaler    albuterol (VENTOLIN HFA) 90 mcg/act inhaler    Weight gain  --Reinforced dietary measures, regular cardio activity  Normal TSH last fall, lacks other symptoms of hypothyroidism, but she would like to have rechecked, nevertheless  Relevant Orders    TSH, 3rd generation with Free T4 reflex    Screening for diabetes mellitus    Relevant Orders    CBC and differential    Comprehensive metabolic panel    Hemoglobin A1C    Need for hepatitis C screening test    Relevant Orders    Hepatitis C antibody    Screening for tuberculosis  --Required for her employer  No symptoms or known exposures  Relevant Orders    Quantiferon TB Gold Plus      Other Visit Diagnoses     Well adult exam    -  Primary  --Healthy 66year old female, mildly overweight  Reinforced healthy diet, regular cardio exercise routine  Check labs per above  --UTD with mammogram/GYN, colonoscopy, eye exam, immunizations          RTO 4 months for recheck of weight (if ongoing concerns)  1 year otherwise  Subjective:      Patient ID: Diego Hoskins is a 66 y o  female  Here for routine well exam      Feels fine overall  Her only concern/complaint is recent (3 months) unintentional weight gain of 15 pounds or so  This is despite her best efforts to maintain a healthy diet  Typical breakfast is omelet, oatmeal, fruit  Lunch is salad, soup  Dinner is healthy protein, carbs  Gets plenty of fruits and vegetables in her diet  Avoids fast food  Eats out only occasionally  Portions are on the smaller side  Drinks water, tea, some 2% milk  Occasional glass of wine, no other alcohol       Admits to have gotten away from her previous exercise regimen which involved cardio and light weights 3-4 times a week  This is primarily the result of caregiver responsibilities with her mom who is temporarily living with them after falling and breaking her hip  She recently (this week) restarted her regimen, however  She would like to get her weight below 150  Ideal for her would be 140 pounds  Normal labs (including TSH, lipids) 11/2018  No recent flares of asthma or allergies  Uses rescue inhaler no more than once a week typically  Good energy level  Denies depression, anxiety  Denies sleep apnea  No orthopedic issues  , 3 grown children, one still living with her  Going to school for aviation  2 grandchildren  2 dogs  Works full time as  for [employer]  Needs TB test for her employer  No past positives  No known exposures  No fevers, night sweats, cough  FH reviewed  Mom healthy other than hip fracture  No known osteoporosis,  however  Father with asthma  Brother healthy  Non-smoker  No drug use  Postmenopausal x 5+ years  No spotting or discharge  Has GYN (Dr Lamin Douglas)  UTD with exam + mammogram (11/2018)  No breast issues  Does SBE  Colonoscopy last year  Normal (history mentions polyps, which she denies)  Told to repeat in 10 years  No stool changes including melena, hematochezia  No urinary issues  No vision or hearing problems  Eye exam within the past year  Tdap 2014  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)  The following portions of the patient's history were reviewed and updated as appropriate: current medications, past family history, past medical history, past social history, past surgical history and problem list     Review of Systems   Constitutional: Negative for fever  HENT: Negative for hearing loss and sore throat  Eyes: Negative for visual disturbance  Respiratory: Negative for cough, shortness of breath and wheezing  Cardiovascular: Negative for chest pain and palpitations  Gastrointestinal: Negative for abdominal pain, blood in stool, constipation, diarrhea, nausea and vomiting  Genitourinary: Negative for difficulty urinating  Musculoskeletal: Negative for arthralgias and myalgias  Skin: Negative  Neurological: Negative for dizziness and headaches  Psychiatric/Behavioral: Negative  Negative for dysphoric mood  Objective:      /64   Pulse 78   Temp 97 6 °F (36 4 °C)   Resp 16   Ht 5' 5 3" (1 659 m)   Wt 77 6 kg (171 lb)   BMI 28 20 kg/m²          Physical Exam   Constitutional: She is oriented to person, place, and time  She appears well-developed and well-nourished  HENT:   Head: Normocephalic  Right Ear: External ear normal    Left Ear: External ear normal    Nose: Nose normal    Mouth/Throat: Oropharynx is clear and moist    Eyes: Pupils are equal, round, and reactive to light  Conjunctivae are normal    Neck: Normal range of motion  Neck supple  No thyromegaly present  Cardiovascular: Normal rate, regular rhythm and normal heart sounds  Pulmonary/Chest: Effort normal and breath sounds normal    Abdominal: Soft  Bowel sounds are normal  There is no tenderness  Lymphadenopathy:     She has no cervical adenopathy  Neurological: She is alert and oriented to person, place, and time  She has normal reflexes  Skin: Skin is warm and dry  Psychiatric: She has a normal mood and affect

## 2019-03-05 NOTE — PATIENT INSTRUCTIONS
Wellness Visit for Adults   WHAT YOU NEED TO KNOW:   What is a wellness visit? A wellness visit is when you see your healthcare provider to get screened for health problems  You can also get advice on how to stay healthy  Write down your questions so you remember to ask them  Ask your healthcare provider how often you should have a wellness visit  What happens at a wellness visit? Your healthcare provider will ask about your health, and your family history of health problems  This includes high blood pressure, heart disease, and cancer  He or she will ask if you have symptoms that concern you, if you smoke, and about your mood  You may also be asked about your intake of medicines, supplements, food, and alcohol  Any of the following may be done:  · Your weight  will be checked  Your height may also be checked so your body mass index (BMI) can be calculated  Your BMI shows if you are at a healthy weight  · Your blood pressure  and heart rate will be checked  Your temperature may also be checked  · Blood and urine tests  may be done  Blood tests may be done to check your cholesterol levels  Abnormal cholesterol levels increase your risk for heart disease and stroke  You may also need a blood or urine test to check for diabetes if you are at increased risk  Urine tests may be done to look for signs of an infection or kidney disease  · A physical exam  includes checking your heartbeat and lungs with a stethoscope  Your healthcare provider may also check your skin to look for sun damage  · Screening tests  may be recommended  A screening test is done to check for diseases that may not cause symptoms  The screening tests you may need depend on your age, gender, family history, and lifestyle habits  For example, colorectal screening may be recommended if you are 48years old or older  What screening tests do I need if I am a woman? · A Pap smear  is used to screen for cervical cancer   Pap smears are usually done every 3 to 5 years depending on your age  You may need them more often if you have had abnormal Pap smear test results in the past  Ask your healthcare provider how often you should have a Pap smear  · A mammogram  is an x-ray of your breasts to screen for breast cancer  Experts recommend mammograms every 2 years starting at age 48 years  You may need a mammogram at age 52 years or younger if you have an increased risk for breast cancer  Talk to your healthcare provider about when you should start having mammograms and how often you need them  What vaccines might I need? · Get an influenza vaccine  every year  The influenza vaccine protects you from the flu  Several types of viruses cause the flu  The viruses change over time, so new vaccines are made each year  · Get a tetanus-diphtheria (Td) booster vaccine  every 10 years  This vaccine protects you against tetanus and diphtheria  Tetanus is a severe infection that may cause painful muscle spasms and lockjaw  Diphtheria is a severe bacterial infection that causes a thick covering in the back of your mouth and throat  · Get a human papillomavirus (HPV) vaccine  if you are female and aged 23 to 32 or male 23 to 24 and never received it  This vaccine protects you from HPV infection  HPV is the most common infection spread by sexual contact  HPV may also cause vaginal, penile, and anal cancers  · Get a pneumococcal vaccine  if you are aged 72 years or older  The pneumococcal vaccine is an injection given to protect you from pneumococcal disease  Pneumococcal disease is an infection caused by pneumococcal bacteria  The infection may cause pneumonia, meningitis, or an ear infection  · Get a shingles vaccine  if you are aged 61 or older, even if you have had shingles before  The shingles vaccine is an injection to protect you from the varicella-zoster virus  This is the same virus that causes chickenpox   Shingles is a painful rash that develops in people who had chickenpox or have been exposed to the virus  How can I eat healthy? My Plate is a model for planning healthy meals  It shows the types and amounts of foods that should go on your plate  Fruits and vegetables make up about half of your plate, and grains and protein make up the other half  A serving of dairy is included on the side of your plate  The amount of calories and serving sizes you need depends on your age, gender, weight, and height  Examples of healthy foods are listed below:  · Eat a variety of vegetables  such as dark green, red, and orange vegetables  You can also include canned vegetables low in sodium (salt) and frozen vegetables without added butter or sauces  · Eat a variety of fresh fruits , canned fruit in 100% juice, frozen fruit, and dried fruit  · Include whole grains  At least half of the grains you eat should be whole grains  Examples include whole-wheat bread, wheat pasta, brown rice, and whole-grain cereals such as oatmeal     · Eat a variety of protein foods such as seafood (fish and shellfish), lean meat, and poultry without skin (turkey and chicken)  Examples of lean meats include pork leg, shoulder, or tenderloin, and beef round, sirloin, tenderloin, and extra lean ground beef  Other protein foods include eggs and egg substitutes, beans, peas, soy products, nuts, and seeds  · Choose low-fat dairy products such as skim or 1% milk or low-fat yogurt, cheese, and cottage cheese  · Limit unhealthy fats  such as butter, hard margarine, and shortening  How much exercise do I need? Exercise at least 30 minutes per day on most days of the week  Some examples of exercise include walking, biking, dancing, and swimming  You can also fit in more physical activity by taking the stairs instead of the elevator or parking farther away from stores  Include muscle strengthening activities 2 days each week  Regular exercise provides many health benefits  It helps you manage your weight, and decreases your risk for type 2 diabetes, heart disease, stroke, and high blood pressure  Exercise can also help improve your mood  Ask your healthcare provider about the best exercise plan for you  What are some general health and safety guidelines I should follow? · Do not smoke  Nicotine and other chemicals in cigarettes and cigars can cause lung damage  Ask your healthcare provider for information if you currently smoke and need help to quit  E-cigarettes or smokeless tobacco still contain nicotine  Talk to your healthcare provider before you use these products  · Limit alcohol  A drink of alcohol is 12 ounces of beer, 5 ounces of wine, or 1½ ounces of liquor  · Lose weight, if needed  Being overweight increases your risk of certain health conditions  These include heart disease, high blood pressure, type 2 diabetes, and certain types of cancer  · Protect your skin  Do not sunbathe or use tanning beds  Use sunscreen with a SPF 15 or higher  Apply sunscreen at least 15 minutes before you go outside  Reapply sunscreen every 2 hours  Wear protective clothing, hats, and sunglasses when you are outside  · Drive safely  Always wear your seatbelt  Make sure everyone in your car wears a seatbelt  A seatbelt can save your life if you are in an accident  Do not use your cell phone when you are driving  This could distract you and cause an accident  Pull over if you need to make a call or send a text message  · Practice safe sex  Use latex condoms if are sexually active and have more than one partner  Your healthcare provider may recommend screening tests for sexually transmitted infections (STIs)  · Wear helmets, lifejackets, and protective gear  Always wear a helmet when you ride a bike or motorcycle, go skiing, or play sports that could cause a head injury  Wear protective equipment when you play sports   Wear a lifejacket when you are on a boat or doing water sports  CARE AGREEMENT:   You have the right to help plan your care  Learn about your health condition and how it may be treated  Discuss treatment options with your caregivers to decide what care you want to receive  You always have the right to refuse treatment  The above information is an  only  It is not intended as medical advice for individual conditions or treatments  Talk to your doctor, nurse or pharmacist before following any medical regimen to see if it is safe and effective for you  © 2017 [employer] Information is for End User's use only and may not be sold, redistributed or otherwise used for commercial purposes  All illustrations and images included in CareNotes® are the copyrighted property of [employer]  or Alexandru Maza

## 2019-03-12 ENCOUNTER — APPOINTMENT (OUTPATIENT)
Dept: LAB | Facility: CLINIC | Age: 59
End: 2019-03-12
Payer: COMMERCIAL

## 2019-03-12 DIAGNOSIS — Z11.1 SCREENING FOR TUBERCULOSIS: ICD-10-CM

## 2019-03-12 DIAGNOSIS — Z13.1 SCREENING FOR DIABETES MELLITUS: ICD-10-CM

## 2019-03-12 DIAGNOSIS — Z11.59 NEED FOR HEPATITIS C SCREENING TEST: ICD-10-CM

## 2019-03-12 DIAGNOSIS — R63.5 WEIGHT GAIN: ICD-10-CM

## 2019-03-12 LAB
ALBUMIN SERPL BCP-MCNC: 3.8 G/DL (ref 3.5–5)
ALP SERPL-CCNC: 91 U/L (ref 46–116)
ALT SERPL W P-5'-P-CCNC: 19 U/L (ref 12–78)
ANION GAP SERPL CALCULATED.3IONS-SCNC: 7 MMOL/L (ref 4–13)
AST SERPL W P-5'-P-CCNC: 12 U/L (ref 5–45)
BASOPHILS # BLD AUTO: 0.1 THOUSANDS/ΜL (ref 0–0.1)
BASOPHILS NFR BLD AUTO: 2 % (ref 0–1)
BILIRUB SERPL-MCNC: 0.42 MG/DL (ref 0.2–1)
BUN SERPL-MCNC: 15 MG/DL (ref 5–25)
CALCIUM SERPL-MCNC: 9.1 MG/DL (ref 8.3–10.1)
CHLORIDE SERPL-SCNC: 106 MMOL/L (ref 100–108)
CO2 SERPL-SCNC: 27 MMOL/L (ref 21–32)
CREAT SERPL-MCNC: 0.82 MG/DL (ref 0.6–1.3)
EOSINOPHIL # BLD AUTO: 0.95 THOUSAND/ΜL (ref 0–0.61)
EOSINOPHIL NFR BLD AUTO: 14 % (ref 0–6)
ERYTHROCYTE [DISTWIDTH] IN BLOOD BY AUTOMATED COUNT: 14.2 % (ref 11.6–15.1)
EST. AVERAGE GLUCOSE BLD GHB EST-MCNC: 123 MG/DL
GFR SERPL CREATININE-BSD FRML MDRD: 80 ML/MIN/1.73SQ M
GLUCOSE P FAST SERPL-MCNC: 72 MG/DL (ref 65–99)
HBA1C MFR BLD: 5.9 % (ref 4.2–6.3)
HCT VFR BLD AUTO: 43.1 % (ref 34.8–46.1)
HCV AB SER QL: NORMAL
HGB BLD-MCNC: 13.5 G/DL (ref 11.5–15.4)
IMM GRANULOCYTES # BLD AUTO: 0.01 THOUSAND/UL (ref 0–0.2)
IMM GRANULOCYTES NFR BLD AUTO: 0 % (ref 0–2)
LYMPHOCYTES # BLD AUTO: 2.01 THOUSANDS/ΜL (ref 0.6–4.47)
LYMPHOCYTES NFR BLD AUTO: 31 % (ref 14–44)
MCH RBC QN AUTO: 28.1 PG (ref 26.8–34.3)
MCHC RBC AUTO-ENTMCNC: 31.3 G/DL (ref 31.4–37.4)
MCV RBC AUTO: 90 FL (ref 82–98)
MONOCYTES # BLD AUTO: 0.42 THOUSAND/ΜL (ref 0.17–1.22)
MONOCYTES NFR BLD AUTO: 6 % (ref 4–12)
NEUTROPHILS # BLD AUTO: 3.09 THOUSANDS/ΜL (ref 1.85–7.62)
NEUTS SEG NFR BLD AUTO: 47 % (ref 43–75)
NRBC BLD AUTO-RTO: 0 /100 WBCS
PLATELET # BLD AUTO: 278 THOUSANDS/UL (ref 149–390)
PMV BLD AUTO: 10.7 FL (ref 8.9–12.7)
POTASSIUM SERPL-SCNC: 4.3 MMOL/L (ref 3.5–5.3)
PROT SERPL-MCNC: 7.1 G/DL (ref 6.4–8.2)
RBC # BLD AUTO: 4.8 MILLION/UL (ref 3.81–5.12)
SODIUM SERPL-SCNC: 140 MMOL/L (ref 136–145)
TSH SERPL DL<=0.05 MIU/L-ACNC: 3.08 UIU/ML (ref 0.36–3.74)
WBC # BLD AUTO: 6.58 THOUSAND/UL (ref 4.31–10.16)

## 2019-03-12 PROCEDURE — 83036 HEMOGLOBIN GLYCOSYLATED A1C: CPT

## 2019-03-12 PROCEDURE — 84443 ASSAY THYROID STIM HORMONE: CPT

## 2019-03-12 PROCEDURE — 86803 HEPATITIS C AB TEST: CPT

## 2019-03-12 PROCEDURE — 86480 TB TEST CELL IMMUN MEASURE: CPT

## 2019-03-12 PROCEDURE — 80053 COMPREHEN METABOLIC PANEL: CPT

## 2019-03-12 PROCEDURE — 85025 COMPLETE CBC W/AUTO DIFF WBC: CPT

## 2019-03-12 PROCEDURE — 36415 COLL VENOUS BLD VENIPUNCTURE: CPT

## 2019-03-13 LAB
GAMMA INTERFERON BACKGROUND BLD IA-ACNC: 0.06 IU/ML
M TB IFN-G BLD-IMP: NEGATIVE
M TB IFN-G CD4+ BCKGRND COR BLD-ACNC: -0.01 IU/ML
M TB IFN-G CD4+ BCKGRND COR BLD-ACNC: 0 IU/ML
MITOGEN IGNF BCKGRD COR BLD-ACNC: 3.3 IU/ML

## 2019-03-15 PROBLEM — R73.03 PREDIABETES: Status: ACTIVE | Noted: 2019-03-15

## 2019-03-18 ENCOUNTER — TELEPHONE (OUTPATIENT)
Dept: FAMILY MEDICINE CLINIC | Facility: CLINIC | Age: 59
End: 2019-03-18

## 2019-03-18 NOTE — TELEPHONE ENCOUNTER
----- Message from Mireya Burns, [address] sent at 3/15/2019  1:33 PM EDT -----  Can we call Jose Zee to notify of blood work results:   --Everything fine, including normal thyroid level, negative TB test (can provide copy for employer if needed)  --Only issue is mildly elevated average blood sugar level (A1C), just mildly in "prediabetes" range  Continuing to watch weight, avoiding sugar and excess carbs (especially "bad" carbs) will help with this       Thanks

## 2019-04-05 ENCOUNTER — ANNUAL EXAM (OUTPATIENT)
Dept: OBGYN CLINIC | Facility: CLINIC | Age: 59
End: 2019-04-05
Payer: COMMERCIAL

## 2019-04-05 VITALS
BODY MASS INDEX: 30.3 KG/M2 | HEIGHT: 64 IN | SYSTOLIC BLOOD PRESSURE: 120 MMHG | WEIGHT: 177.5 LBS | DIASTOLIC BLOOD PRESSURE: 88 MMHG

## 2019-04-05 DIAGNOSIS — Z12.39 BREAST CANCER SCREENING: ICD-10-CM

## 2019-04-05 DIAGNOSIS — Z78.0 POSTMENOPAUSAL: ICD-10-CM

## 2019-04-05 DIAGNOSIS — Z01.419 ENCOUNTER FOR WELL WOMAN EXAM: Primary | ICD-10-CM

## 2019-04-05 PROCEDURE — S0610 ANNUAL GYNECOLOGICAL EXAMINA: HCPCS | Performed by: PHYSICIAN ASSISTANT

## 2019-04-24 ENCOUNTER — TELEPHONE (OUTPATIENT)
Dept: FAMILY MEDICINE CLINIC | Facility: CLINIC | Age: 59
End: 2019-04-24

## 2019-09-02 DIAGNOSIS — J45.40 MODERATE PERSISTENT ASTHMA WITHOUT COMPLICATION: ICD-10-CM

## 2019-09-02 RX ORDER — MONTELUKAST SODIUM 10 MG/1
TABLET ORAL
Qty: 90 TABLET | Refills: 3 | Status: SHIPPED | OUTPATIENT
Start: 2019-09-02 | End: 2020-07-09 | Stop reason: SDUPTHER

## 2019-10-10 ENCOUNTER — OFFICE VISIT (OUTPATIENT)
Dept: FAMILY MEDICINE CLINIC | Facility: CLINIC | Age: 59
End: 2019-10-10
Payer: COMMERCIAL

## 2019-10-10 VITALS
SYSTOLIC BLOOD PRESSURE: 114 MMHG | HEIGHT: 64 IN | DIASTOLIC BLOOD PRESSURE: 78 MMHG | BODY MASS INDEX: 28.68 KG/M2 | WEIGHT: 168 LBS | TEMPERATURE: 98.5 F

## 2019-10-10 DIAGNOSIS — Z23 NEED FOR INFLUENZA VACCINATION: ICD-10-CM

## 2019-10-10 DIAGNOSIS — J01.00 ACUTE NON-RECURRENT MAXILLARY SINUSITIS: Primary | ICD-10-CM

## 2019-10-10 PROCEDURE — 90471 IMMUNIZATION ADMIN: CPT

## 2019-10-10 PROCEDURE — 99214 OFFICE O/P EST MOD 30 MIN: CPT | Performed by: PHYSICIAN ASSISTANT

## 2019-10-10 PROCEDURE — 90682 RIV4 VACC RECOMBINANT DNA IM: CPT

## 2019-10-10 PROCEDURE — 3008F BODY MASS INDEX DOCD: CPT | Performed by: PHYSICIAN ASSISTANT

## 2019-10-10 RX ORDER — AMOXICILLIN 875 MG/1
875 TABLET, COATED ORAL 2 TIMES DAILY
Qty: 10 TABLET | Refills: 0 | Status: SHIPPED | OUTPATIENT
Start: 2019-10-10 | End: 2019-10-15

## 2019-10-10 RX ORDER — FLUTICASONE PROPIONATE 50 MCG
2 SPRAY, SUSPENSION (ML) NASAL DAILY
Qty: 1 BOTTLE | Refills: 0 | Status: SHIPPED | OUTPATIENT
Start: 2019-10-10

## 2019-10-10 NOTE — PROGRESS NOTES
Assessment/Plan:     Diagnoses and all orders for this visit:    Acute non-recurrent maxillary sinusitis  Afebrile  - Recommended saline nasal spray and intranasal steroid spray  Refilled Flonase   - Pt will continue with OTC supportive medications  - Amoxicillin ONLY is symptoms persist for 10 days or longer from onset  - Directed pt to return if fever over 101, visual changes, worsening headache   -     fluticasone (FLONASE) 50 mcg/act nasal spray; 2 sprays into each nostril daily  -     amoxicillin (AMOXIL) 875 mg tablet; Take 1 tablet (875 mg total) by mouth 2 (two) times a day for 5 days            Subjective:    Patient ID: Tamiko Rivas is a 64 y o  female  Pt is presenting today for Maxillary sinus pressure, rhinorrhea for 4 days  Started with sore throat which has resolved  She continues with     She has asthma and feels a little tight  She has not needed to use rescue inhaler  She continues to take advair daily    Started with saline nasap spray yesterday    URI    There has been no fever  Associated symptoms include congestion (head), rhinorrhea and sinus pain  Pertinent negatives include no coughing, ear pain, headaches, nausea, plugged ear sensation, sore throat or vomiting  The following portions of the patient's history were reviewed and updated as appropriate: allergies, current medications and problem list     Review of Systems   HENT: Positive for congestion (head), rhinorrhea and sinus pain  Negative for ear pain and sore throat  Respiratory: Negative for cough  Gastrointestinal: Negative for nausea and vomiting  Neurological: Negative for headaches  Objective:  /78 (BP Location: Left arm, Patient Position: Sitting, Cuff Size: Standard)   Temp 98 5 °F (36 9 °C)   Ht 5' 4" (1 626 m)   Wt 76 2 kg (168 lb)   BMI 28 84 kg/m²      Physical Exam   Constitutional: She is oriented to person, place, and time  She appears well-developed and well-nourished  No distress  HENT:   Head: Normocephalic and atraumatic  Right Ear: Tympanic membrane, external ear and ear canal normal    Left Ear: Tympanic membrane, external ear and ear canal normal    Nose: No rhinorrhea  Right sinus exhibits maxillary sinus tenderness and frontal sinus tenderness  Left sinus exhibits maxillary sinus tenderness  Left sinus exhibits no frontal sinus tenderness  Mouth/Throat: Oropharynx is clear and moist  No oropharyngeal exudate or posterior oropharyngeal erythema  Eyes: Pupils are equal, round, and reactive to light  Neck: Normal range of motion  Neck supple  Cardiovascular: Normal rate, regular rhythm, normal heart sounds and intact distal pulses  Exam reveals no gallop and no friction rub  No murmur heard  Pulmonary/Chest: Effort normal and breath sounds normal  No respiratory distress  She has no wheezes  She has no rales  Lymphadenopathy:     She has no cervical adenopathy  Neurological: She is alert and oriented to person, place, and time  Skin: She is not diaphoretic  Psychiatric: She has a normal mood and affect  Her behavior is normal  Thought content normal    Vitals reviewed

## 2019-10-14 ENCOUNTER — HOSPITAL ENCOUNTER (OUTPATIENT)
Dept: RADIOLOGY | Age: 59
Discharge: HOME | End: 2019-10-14
Payer: COMMERCIAL

## 2019-10-14 VITALS — WEIGHT: 168 LBS | BODY MASS INDEX: 28.68 KG/M2 | HEIGHT: 64 IN

## 2019-10-14 DIAGNOSIS — Z12.39 BREAST CANCER SCREENING: ICD-10-CM

## 2019-10-14 PROCEDURE — 77063 BREAST TOMOSYNTHESIS BI: CPT

## 2019-10-14 PROCEDURE — 77067 SCR MAMMO BI INCL CAD: CPT

## 2019-10-25 DIAGNOSIS — J01.00 ACUTE NON-RECURRENT MAXILLARY SINUSITIS: ICD-10-CM

## 2019-10-25 RX ORDER — FLUTICASONE PROPIONATE 50 MCG
SPRAY, SUSPENSION (ML) NASAL
Refills: 0 | OUTPATIENT
Start: 2019-10-25

## 2020-03-07 DIAGNOSIS — J45.40 MODERATE PERSISTENT ASTHMA WITHOUT COMPLICATION: ICD-10-CM

## 2020-07-09 ENCOUNTER — TELEMEDICINE (OUTPATIENT)
Dept: FAMILY MEDICINE CLINIC | Facility: CLINIC | Age: 60
End: 2020-07-09
Payer: COMMERCIAL

## 2020-07-09 DIAGNOSIS — J45.40 MODERATE PERSISTENT ASTHMA WITHOUT COMPLICATION: Primary | ICD-10-CM

## 2020-07-09 PROBLEM — Z13.1 SCREENING FOR DIABETES MELLITUS: Status: RESOLVED | Noted: 2019-03-05 | Resolved: 2020-07-09

## 2020-07-09 PROBLEM — Z11.59 NEED FOR HEPATITIS C SCREENING TEST: Status: RESOLVED | Noted: 2019-03-05 | Resolved: 2020-07-09

## 2020-07-09 PROBLEM — Z11.1 SCREENING FOR TUBERCULOSIS: Status: RESOLVED | Noted: 2019-03-05 | Resolved: 2020-07-09

## 2020-07-09 PROBLEM — R63.5 WEIGHT GAIN: Status: RESOLVED | Noted: 2019-03-05 | Resolved: 2020-07-09

## 2020-07-09 PROCEDURE — 99213 OFFICE O/P EST LOW 20 MIN: CPT | Performed by: FAMILY MEDICINE

## 2020-07-09 RX ORDER — MONTELUKAST SODIUM 10 MG/1
10 TABLET ORAL DAILY
Qty: 90 TABLET | Refills: 3 | Status: SHIPPED | OUTPATIENT
Start: 2020-07-09 | End: 2020-08-27

## 2020-07-09 NOTE — PROGRESS NOTES
Assessment/Plan:    No problem-specific Assessment & Plan notes found for this encounter  {Assess/PlanSmartLinks:57689}      Subjective:      Patient ID: Sarika Correa is a 64 y o  female  Follow up visit  Televisit  History of asthma on Advair 250/50 BID and Singulair 10 mg/day  {Common ambulatory SmartLinks:45090}    Review of Systems   Constitutional: Negative for appetite change, chills, fatigue, fever and unexpected weight change  HENT: Negative for congestion, ear pain, rhinorrhea, sore throat and trouble swallowing  Eyes: Negative for visual disturbance  Respiratory: Negative for cough, shortness of breath and wheezing  Cardiovascular: Negative for chest pain, palpitations and leg swelling  Gastrointestinal: Negative for abdominal pain, blood in stool, constipation, diarrhea, nausea and vomiting  Genitourinary: Negative for difficulty urinating  Musculoskeletal: Negative for arthralgias and myalgias  Skin: Negative for rash  Neurological: Negative for dizziness and headaches  Hematological: Negative for adenopathy  Does not bruise/bleed easily  Psychiatric/Behavioral: Negative for dysphoric mood and sleep disturbance  Objective: There were no vitals taken for this visit           Physical Exam

## 2020-07-09 NOTE — PROGRESS NOTES
Virtual Regular Visit      Assessment/Plan:    Problem List Items Addressed This Visit        Respiratory    Asthma - Primary    Relevant Medications    montelukast (SINGULAIR) 10 mg tablet        Change Advair 250/50 to 1 puff BID Continue with Singular 10 mg at HS and p r n  Albuterol inhaler  Advised to call if no improvement in 1 week       Reason for visit is   Chief Complaint   Patient presents with    Virtual Regular Visit        Encounter provider Clifford Butler MD    Provider located at [place] [address]  [phone]      Recent Visits  No visits were found meeting these conditions  Showing recent visits within past 7 days and meeting all other requirements     Today's Visits  Date Type Provider Dept   07/09/20 [address], MD [facility]   Showing today's visits and meeting all other requirements     Future Appointments  No visits were found meeting these conditions  Showing future appointments within next 150 days and meeting all other requirements        The patient was identified by name and date of birth  Denice Ashley was informed that this is a telemedicine visit and that the visit is being conducted through [address] [place] [address] and patient was informed that this is not a secure, HIPAA-complaint platform  She agrees to proceed     My office door was closed  No one else was in the room  She acknowledged consent and understanding of privacy and security of the video platform  The patient has agreed to participate and understands they can discontinue the visit at any time  Patient is aware this is a billable service  Subjective  Denice Ashley is a 64 y o  female    Follow-up visit  Telemedicine  History of asthma on Advair 250/51 puff daily and Singulair 10 mg at HS  Patient was seen at urgent care 02/2020 with acute exacerbation of asthma and was treated with a short course of Prednisone    Since that time patient has noted more frequent Albuterol use particularly with exercise  She had 2 episodes of waking up with wheezing  Occasional mild nonproductive cough  No nasal congestion or postnasal drainage  Prior negative allergy testing  Nonsmoker  Exercises regularly  No cardiac history  Lab Results   Component Value Date    WBC 6 58 03/12/2019    HGB 13 5 03/12/2019    HCT 43 1 03/12/2019    MCV 90 03/12/2019     03/12/2019       Lab Results   Component Value Date     10/24/2014    SODIUM 140 03/12/2019    K 4 3 03/12/2019     03/12/2019    CO2 27 03/12/2019    ANIONGAP 8 10/24/2014    AGAP 7 03/12/2019    BUN 15 03/12/2019    CREATININE 0 82 03/12/2019    GLUC 99 02/12/2016    GLUF 72 03/12/2019    CALCIUM 9 1 03/12/2019    AST 12 03/12/2019    ALT 19 03/12/2019    ALKPHOS 91 03/12/2019    PROT 7 5 10/24/2014    TP 7 1 03/12/2019    BILITOT 0 34 10/24/2014    TBILI 0 42 03/12/2019    EGFR 80 03/12/2019       Lab Results   Component Value Date    MCJ0KYGCXLSV 3 080 03/12/2019         Past Medical History:   Diagnosis Date    Asthma     Encounter for surveillance of contraceptives, unspecified 07/30/2013    H/O mammogram     Papanicolaou smear 04/02/2018    NEG    Plantar fasciitis 08/09/2017    Resolved:  November 24, 2017    Post-menopausal        Past Surgical History:   Procedure Laterality Date    CARPAL TUNNEL RELEASE Bilateral 2016    COLONOSCOPY  2012    normal    MAMMO (HISTORICAL) Bilateral 09/27/2018    No evidence of malignancy    MI COLONOSCOPY FLX DX W/COLLJ SPEC WHEN PFRMD N/A 11/26/2018    Procedure: COLONOSCOPY;  Surgeon: Cecil Valentino MD;  Location: [facility];   Service: Gastroenterology    VAGINAL DELIVERY      x3       Current Outpatient Medications   Medication Sig Dispense Refill    fexofenadine (ALLEGRA) 180 MG tablet Take 1 tablet by mouth as needed      fluticasone (FLONASE) 50 mcg/act nasal spray 2 sprays into each nostril daily 1 Bottle 0    fluticasone-salmeterol (ADVAIR DISKUS) 250-50 mcg/dose inhaler Inhale 1 puff daily 1 Inhaler 5    montelukast (SINGULAIR) 10 mg tablet Take 1 tablet (10 mg total) by mouth daily 90 tablet 3    VENTOLIN  (90 Base) MCG/ACT inhaler INHALE 1 TO 2 PUFFS BY MOUTH EVERY 4 HOURS AS NEEDED FOR WHEEZING 36 Inhaler 5     No current facility-administered medications for this visit  No Known Allergies    Review of Systems   Constitutional: Negative for appetite change, chills, fatigue, fever and unexpected weight change  HENT: Negative for congestion, ear pain, postnasal drip, rhinorrhea and sore throat  Eyes: Negative for visual disturbance  Respiratory: Positive for cough, shortness of breath and wheezing  See HPI  No orthopnea or PND   Cardiovascular: Negative for chest pain, palpitations and leg swelling  Gastrointestinal: Negative for abdominal pain, blood in stool, constipation, diarrhea, nausea and vomiting  See HPI  No reflux symptoms   Musculoskeletal: Negative for arthralgias and myalgias  Skin: Negative for rash  Neurological: Negative for dizziness and headaches  Hematological: Negative for adenopathy  Video Exam    There were no vitals filed for this visit  BP Readings from Last 3 Encounters:   10/10/19 114/78   04/05/19 120/88   03/05/19 112/64     Wt Readings from Last 3 Encounters:   10/14/19 76 2 kg (168 lb)   10/10/19 76 2 kg (168 lb)   04/05/19 80 5 kg (177 lb 8 oz)         Physical Exam   Constitutional: She is oriented to person, place, and time  She appears well-developed and well-nourished  No distress  Eyes: Conjunctivae are normal  No scleral icterus  Pulmonary/Chest: Effort normal  No respiratory distress  She has no wheezes  No audible wheezing   Neurological: She is alert and oriented to person, place, and time  Psychiatric: She has a normal mood and affect   Her behavior is normal         I spent 10 minutes directly with the patient during this visit      VIRTUAL VISIT [address] [place] [address] acknowledges that she has consented to an online visit or consultation  She understands that the online visit is based solely on information provided by her, and that, in the absence of a face-to-face physical evaluation by the physician, the diagnosis she receives is both limited and provisional in terms of accuracy and completeness  This is not intended to replace a full medical face-to-face evaluation by the physician  Tony Lucas understands and accepts these terms

## 2020-07-14 ENCOUNTER — ANNUAL EXAM (OUTPATIENT)
Dept: OBGYN CLINIC | Facility: CLINIC | Age: 60
End: 2020-07-14
Payer: COMMERCIAL

## 2020-07-14 VITALS
DIASTOLIC BLOOD PRESSURE: 62 MMHG | TEMPERATURE: 97.8 F | HEIGHT: 65 IN | SYSTOLIC BLOOD PRESSURE: 100 MMHG | BODY MASS INDEX: 28.16 KG/M2 | WEIGHT: 169 LBS

## 2020-07-14 DIAGNOSIS — B37.49 GENITAL CANDIDIASIS: ICD-10-CM

## 2020-07-14 DIAGNOSIS — N76.0 ACUTE VAGINITIS: ICD-10-CM

## 2020-07-14 DIAGNOSIS — Z11.3 SCREENING FOR STDS (SEXUALLY TRANSMITTED DISEASES): ICD-10-CM

## 2020-07-14 DIAGNOSIS — Z12.31 ENCOUNTER FOR SCREENING MAMMOGRAM FOR BREAST CANCER: Primary | ICD-10-CM

## 2020-07-14 DIAGNOSIS — Z01.419 ENCOUNTER FOR ANNUAL ROUTINE GYNECOLOGICAL EXAMINATION: ICD-10-CM

## 2020-07-14 DIAGNOSIS — N89.8 VAGINAL DISCHARGE: ICD-10-CM

## 2020-07-14 DIAGNOSIS — B96.89 BACTERIAL VAGINOSIS: ICD-10-CM

## 2020-07-14 DIAGNOSIS — N76.0 BACTERIAL VAGINOSIS: ICD-10-CM

## 2020-07-14 PROCEDURE — 3008F BODY MASS INDEX DOCD: CPT | Performed by: OBSTETRICS & GYNECOLOGY

## 2020-07-14 PROCEDURE — 99212 OFFICE O/P EST SF 10 MIN: CPT | Performed by: OBSTETRICS & GYNECOLOGY

## 2020-07-14 PROCEDURE — S0612 ANNUAL GYNECOLOGICAL EXAMINA: HCPCS | Performed by: OBSTETRICS & GYNECOLOGY

## 2020-07-14 PROCEDURE — 83986 ASSAY PH BODY FLUID NOS: CPT | Performed by: OBSTETRICS & GYNECOLOGY

## 2020-07-14 RX ORDER — METRONIDAZOLE 7.5 MG/G
1 GEL VAGINAL DAILY
Qty: 70 G | Refills: 0 | Status: SHIPPED | OUTPATIENT
Start: 2020-07-14 | End: 2020-07-19

## 2020-07-14 NOTE — PROGRESS NOTES
The patient is here for a yearly  The patient is not due for a pap smear  4/2/18 Normal Pap, Negative HPV  3/29/17 Normal Pap  No bleeding or cramping  No bladder, bowel or breast problems  The patient is having vaginal discharge

## 2020-07-14 NOTE — PROGRESS NOTES
Assessment/Plan:    Breast and gyn exam except for bacterial vaginosis  Normal colonoscopy     Plan:  Rx mammogram   Rx Metrogel vaginal cream x5 days  Check vaginal culture  Continue healthy diet and exercise  Continue multivitamin with vitamin D,C and zinc   Next colonoscopy at 8 years    Subjective:      Patient ID: Brandie Newton is a 64 y  o  female presents for yearly exam with no complaints except some mild vaginal discharge  Patient is sexually active  Denies any fevers chills  Denies any vaginal bleeding or pelvic pain  Denies any breast bowel or bladder issues  No change family history  Medications reviewed  Review of Systems   Constitutional: Negative  HENT: Negative  Eyes: Negative  Respiratory: Negative  Cardiovascular: Negative  Gastrointestinal: Negative  Endocrine: Negative  Genitourinary: Positive for vaginal discharge  Musculoskeletal: Negative  Skin: Negative  Allergic/Immunologic: Negative  Neurological: Negative  Hematological: Negative  Psychiatric/Behavioral: Negative  All other systems reviewed and are negative  Objective:      /62 (BP Location: Left arm, Patient Position: Sitting, Cuff Size: Standard)   Temp 97 8 °F (36 6 °C)   Ht 5' 4 96" (1 65 m)   Wt 76 7 kg (169 lb)   LMP  (LMP Unknown)   BMI 28 16 kg/m²          Physical Exam   Constitutional: She is oriented to person, place, and time  She appears well-developed and well-nourished  HENT:   Head: Normocephalic and atraumatic  Neck: Normal range of motion  Neck supple  No tracheal deviation present  No thyromegaly present  Cardiovascular: Normal rate, regular rhythm and normal heart sounds  Pulmonary/Chest: Effort normal and breath sounds normal  No stridor  No respiratory distress  She has no wheezes  She has no rales  She exhibits no tenderness   Right breast exhibits no inverted nipple, no mass, no nipple discharge, no skin change and no tenderness  Left breast exhibits no inverted nipple, no mass, no nipple discharge, no skin change and no tenderness  No breast swelling, tenderness, discharge or bleeding  Breasts are symmetrical    Abdominal: Soft  Bowel sounds are normal  She exhibits no distension and no mass  There is no tenderness  There is no rebound and no guarding  No hernia  Hernia confirmed negative in the right inguinal area and confirmed negative in the left inguinal area  Genitourinary: Vagina normal and uterus normal  Rectal exam shows no external hemorrhoid, no internal hemorrhoid, no fissure and no mass  No breast swelling, tenderness, discharge or bleeding  No labial fusion  There is no rash, tenderness, lesion or injury on the right labia  There is no rash, tenderness, lesion or injury on the left labia  Uterus is not deviated, not enlarged, not fixed and not tender  Cervix exhibits no motion tenderness, no discharge and no friability  Right adnexum displays no mass, no tenderness and no fullness  Left adnexum displays no mass, no tenderness and no fullness  No erythema, tenderness or bleeding in the vagina  No foreign body in the vagina  No signs of injury around the vagina  No vaginal discharge found  Genitourinary Comments: Mild for off the yellow discharge  Vaginal pH greater than 7 5  Lymphadenopathy: No inguinal adenopathy noted on the right or left side  Neurological: She is alert and oriented to person, place, and time  Skin: Skin is warm and dry  Psychiatric: She has a normal mood and affect   Her behavior is normal  Judgment and thought content normal

## 2020-07-18 LAB
A VAGINAE DNA VAG NAA+PROBE-LOG#: <3.25
A VAGINAE DNA VAG QL NAA+PROBE: NOT DETECTED
BVAB2 DNA VAG QL NAA+PROBE: NOT DETECTED
G VAGINALIS DNA SPEC QL NAA+PROBE: NOT DETECTED
G VAGINALIS DNA VAG NAA+PROBE-LOG#: <3.25
LACTOBACILLUS DNA VAG NAA+PROBE-LOG#: <3.25
LACTOBACILLUS DNA VAG NAA+PROBE-LOG#: NOT DETECTED
MEGA1 DNA VAG QL NAA+PROBE: NOT DETECTED
SL AMB C.ALBICANS BY PCR: NOT DETECTED
SL AMB CANDIDA GENUS: NOT DETECTED
T VAGINALIS RRNA SPEC QL NAA+PROBE: NOT DETECTED

## 2020-07-28 DIAGNOSIS — J45.40 MODERATE PERSISTENT ASTHMA WITHOUT COMPLICATION: ICD-10-CM

## 2020-07-28 NOTE — TELEPHONE ENCOUNTER
Patient called requesting refill on Advair 250-50 mcg inhaler with refills  [address] [place] [address]

## 2020-08-27 DIAGNOSIS — J45.40 MODERATE PERSISTENT ASTHMA WITHOUT COMPLICATION: ICD-10-CM

## 2020-08-27 RX ORDER — MONTELUKAST SODIUM 10 MG/1
TABLET ORAL
Qty: 90 TABLET | Refills: 3 | Status: SHIPPED | OUTPATIENT
Start: 2020-08-27 | End: 2021-08-23

## 2020-10-19 DIAGNOSIS — J45.40 MODERATE PERSISTENT ASTHMA WITHOUT COMPLICATION: ICD-10-CM

## 2020-10-19 RX ORDER — ALBUTEROL SULFATE 90 UG/1
AEROSOL, METERED RESPIRATORY (INHALATION)
Qty: 3 EACH | Refills: 3 | Status: SHIPPED | OUTPATIENT
Start: 2020-10-19 | End: 2021-03-31 | Stop reason: SDUPTHER

## 2020-11-12 ENCOUNTER — OFFICE VISIT (OUTPATIENT)
Dept: FAMILY MEDICINE CLINIC | Facility: CLINIC | Age: 60
End: 2020-11-12
Payer: COMMERCIAL

## 2020-11-12 VITALS
DIASTOLIC BLOOD PRESSURE: 68 MMHG | HEIGHT: 65 IN | OXYGEN SATURATION: 98 % | WEIGHT: 173.5 LBS | HEART RATE: 82 BPM | BODY MASS INDEX: 28.91 KG/M2 | TEMPERATURE: 97.3 F | SYSTOLIC BLOOD PRESSURE: 98 MMHG

## 2020-11-12 DIAGNOSIS — E78.00 HYPERCHOLESTEROLEMIA: ICD-10-CM

## 2020-11-12 DIAGNOSIS — R06.83 SNORING: ICD-10-CM

## 2020-11-12 DIAGNOSIS — Z00.00 ANNUAL PHYSICAL EXAM: Primary | ICD-10-CM

## 2020-11-12 DIAGNOSIS — M25.311 INSTABILITY OF RIGHT SHOULDER JOINT: ICD-10-CM

## 2020-11-12 DIAGNOSIS — Z11.4 SCREENING FOR HIV (HUMAN IMMUNODEFICIENCY VIRUS): ICD-10-CM

## 2020-11-12 DIAGNOSIS — R73.03 PREDIABETES: ICD-10-CM

## 2020-11-12 DIAGNOSIS — E66.09 CLASS 1 OBESITY DUE TO EXCESS CALORIES WITH SERIOUS COMORBIDITY AND BODY MASS INDEX (BMI) OF 30.0 TO 30.9 IN ADULT: ICD-10-CM

## 2020-11-12 DIAGNOSIS — L30.9 ECZEMA, UNSPECIFIED TYPE: ICD-10-CM

## 2020-11-12 PROCEDURE — 1036F TOBACCO NON-USER: CPT | Performed by: PHYSICIAN ASSISTANT

## 2020-11-12 PROCEDURE — 3008F BODY MASS INDEX DOCD: CPT | Performed by: PHYSICIAN ASSISTANT

## 2020-11-12 PROCEDURE — 99396 PREV VISIT EST AGE 40-64: CPT | Performed by: PHYSICIAN ASSISTANT

## 2020-11-12 PROCEDURE — 3725F SCREEN DEPRESSION PERFORMED: CPT | Performed by: PHYSICIAN ASSISTANT

## 2020-11-12 RX ORDER — FEXOFENADINE HCL AND PSEUDOEPHEDRINE HCI 60; 120 MG/1; MG/1
1 TABLET, EXTENDED RELEASE ORAL
COMMUNITY

## 2020-11-16 ENCOUNTER — HOSPITAL ENCOUNTER (OUTPATIENT)
Dept: RADIOLOGY | Age: 60
Discharge: HOME | End: 2020-11-16
Payer: COMMERCIAL

## 2020-11-16 VITALS — BODY MASS INDEX: 29.53 KG/M2 | HEIGHT: 64 IN | WEIGHT: 173 LBS

## 2020-11-16 DIAGNOSIS — Z12.31 ENCOUNTER FOR SCREENING MAMMOGRAM FOR BREAST CANCER: ICD-10-CM

## 2020-11-16 PROCEDURE — 77067 SCR MAMMO BI INCL CAD: CPT

## 2020-11-16 PROCEDURE — 77063 BREAST TOMOSYNTHESIS BI: CPT

## 2020-11-18 ENCOUNTER — LAB (OUTPATIENT)
Dept: LAB | Facility: CLINIC | Age: 60
End: 2020-11-18
Payer: COMMERCIAL

## 2020-11-18 ENCOUNTER — TELEPHONE (OUTPATIENT)
Dept: SLEEP CENTER | Facility: CLINIC | Age: 60
End: 2020-11-18

## 2020-11-18 DIAGNOSIS — Z00.00 ANNUAL PHYSICAL EXAM: ICD-10-CM

## 2020-11-18 DIAGNOSIS — Z11.4 SCREENING FOR HIV (HUMAN IMMUNODEFICIENCY VIRUS): ICD-10-CM

## 2020-11-18 DIAGNOSIS — R73.03 PREDIABETES: ICD-10-CM

## 2020-11-18 LAB
ALBUMIN SERPL BCP-MCNC: 4 G/DL (ref 3.5–5)
ALP SERPL-CCNC: 83 U/L (ref 46–116)
ALT SERPL W P-5'-P-CCNC: 30 U/L (ref 12–78)
ANION GAP SERPL CALCULATED.3IONS-SCNC: 6 MMOL/L (ref 4–13)
AST SERPL W P-5'-P-CCNC: 22 U/L (ref 5–45)
BILIRUB SERPL-MCNC: 0.37 MG/DL (ref 0.2–1)
BUN SERPL-MCNC: 11 MG/DL (ref 5–25)
CALCIUM SERPL-MCNC: 9.4 MG/DL (ref 8.3–10.1)
CHLORIDE SERPL-SCNC: 108 MMOL/L (ref 100–108)
CHOLEST SERPL-MCNC: 224 MG/DL (ref 50–200)
CO2 SERPL-SCNC: 28 MMOL/L (ref 21–32)
CREAT SERPL-MCNC: 1.03 MG/DL (ref 0.6–1.3)
ERYTHROCYTE [DISTWIDTH] IN BLOOD BY AUTOMATED COUNT: 15.2 % (ref 11.6–15.1)
EST. AVERAGE GLUCOSE BLD GHB EST-MCNC: 128 MG/DL
GFR SERPL CREATININE-BSD FRML MDRD: 60 ML/MIN/1.73SQ M
GLUCOSE P FAST SERPL-MCNC: 98 MG/DL (ref 65–99)
HBA1C MFR BLD: 6.1 %
HCT VFR BLD AUTO: 45.5 % (ref 34.8–46.1)
HDLC SERPL-MCNC: 71 MG/DL
HGB BLD-MCNC: 14 G/DL (ref 11.5–15.4)
LDLC SERPL CALC-MCNC: 135 MG/DL (ref 0–100)
MCH RBC QN AUTO: 27.6 PG (ref 26.8–34.3)
MCHC RBC AUTO-ENTMCNC: 30.8 G/DL (ref 31.4–37.4)
MCV RBC AUTO: 90 FL (ref 82–98)
PLATELET # BLD AUTO: 260 THOUSANDS/UL (ref 149–390)
PMV BLD AUTO: 10.1 FL (ref 8.9–12.7)
POTASSIUM SERPL-SCNC: 4.1 MMOL/L (ref 3.5–5.3)
PROT SERPL-MCNC: 7.3 G/DL (ref 6.4–8.2)
RBC # BLD AUTO: 5.07 MILLION/UL (ref 3.81–5.12)
SODIUM SERPL-SCNC: 142 MMOL/L (ref 136–145)
TRIGL SERPL-MCNC: 91 MG/DL
WBC # BLD AUTO: 4.86 THOUSAND/UL (ref 4.31–10.16)

## 2020-11-18 PROCEDURE — 85027 COMPLETE CBC AUTOMATED: CPT

## 2020-11-18 PROCEDURE — 83036 HEMOGLOBIN GLYCOSYLATED A1C: CPT

## 2020-11-18 PROCEDURE — 80061 LIPID PANEL: CPT

## 2020-11-18 PROCEDURE — 80053 COMPREHEN METABOLIC PANEL: CPT

## 2020-11-18 PROCEDURE — 87389 HIV-1 AG W/HIV-1&-2 AB AG IA: CPT

## 2020-11-18 PROCEDURE — 36415 COLL VENOUS BLD VENIPUNCTURE: CPT

## 2020-11-19 ENCOUNTER — TELEPHONE (OUTPATIENT)
Dept: FAMILY MEDICINE CLINIC | Facility: CLINIC | Age: 60
End: 2020-11-19

## 2020-11-19 LAB — HIV 1+2 AB+HIV1 P24 AG SERPL QL IA: NORMAL

## 2020-11-20 ENCOUNTER — HOSPITAL ENCOUNTER (OUTPATIENT)
Dept: SLEEP CENTER | Facility: CLINIC | Age: 60
Discharge: HOME | End: 2020-11-20
Payer: COMMERCIAL

## 2020-11-20 DIAGNOSIS — R06.83 SNORING: ICD-10-CM

## 2020-11-20 PROCEDURE — G0399 HOME SLEEP TEST/TYPE 3 PORTA: HCPCS

## 2020-11-24 DIAGNOSIS — G47.33 OSA (OBSTRUCTIVE SLEEP APNEA): Primary | ICD-10-CM

## 2020-11-25 ENCOUNTER — TELEPHONE (OUTPATIENT)
Dept: FAMILY MEDICINE CLINIC | Facility: CLINIC | Age: 60
End: 2020-11-25

## 2020-11-25 ENCOUNTER — TELEPHONE (OUTPATIENT)
Dept: SLEEP CENTER | Facility: CLINIC | Age: 60
End: 2020-11-25

## 2020-11-25 DIAGNOSIS — J06.9 ACUTE URI: Primary | ICD-10-CM

## 2020-11-25 RX ORDER — AZITHROMYCIN 250 MG/1
TABLET, FILM COATED ORAL
Qty: 6 TABLET | Refills: 0 | Status: SHIPPED | OUTPATIENT
Start: 2020-11-25 | End: 2020-11-29

## 2020-12-02 ENCOUNTER — TELEMEDICINE (OUTPATIENT)
Dept: FAMILY MEDICINE CLINIC | Facility: CLINIC | Age: 60
End: 2020-12-02
Payer: COMMERCIAL

## 2020-12-02 DIAGNOSIS — J45.40 MODERATE PERSISTENT ASTHMA WITHOUT COMPLICATION: ICD-10-CM

## 2020-12-02 DIAGNOSIS — R05.8 RECURRENT COUGH: Primary | ICD-10-CM

## 2020-12-02 PROCEDURE — 99214 OFFICE O/P EST MOD 30 MIN: CPT | Performed by: FAMILY MEDICINE

## 2020-12-02 PROCEDURE — 1036F TOBACCO NON-USER: CPT | Performed by: FAMILY MEDICINE

## 2020-12-02 RX ORDER — PREDNISONE 20 MG/1
20 TABLET ORAL 2 TIMES DAILY WITH MEALS
Qty: 10 TABLET | Refills: 0 | Status: SHIPPED | OUTPATIENT
Start: 2020-12-02 | End: 2020-12-07

## 2020-12-04 ENCOUNTER — CLINICAL SUPPORT (OUTPATIENT)
Dept: FAMILY MEDICINE CLINIC | Facility: CLINIC | Age: 60
End: 2020-12-04
Payer: COMMERCIAL

## 2020-12-04 DIAGNOSIS — Z23 ENCOUNTER FOR IMMUNIZATION: Primary | ICD-10-CM

## 2020-12-04 PROCEDURE — 90471 IMMUNIZATION ADMIN: CPT

## 2020-12-04 PROCEDURE — 90750 HZV VACC RECOMBINANT IM: CPT

## 2020-12-31 ENCOUNTER — OFFICE VISIT (OUTPATIENT)
Dept: SLEEP CENTER | Facility: CLINIC | Age: 60
End: 2020-12-31
Payer: COMMERCIAL

## 2020-12-31 VITALS
SYSTOLIC BLOOD PRESSURE: 124 MMHG | DIASTOLIC BLOOD PRESSURE: 74 MMHG | BODY MASS INDEX: 30.22 KG/M2 | HEIGHT: 64 IN | WEIGHT: 177 LBS

## 2020-12-31 DIAGNOSIS — F51.04 PSYCHOPHYSIOLOGICAL INSOMNIA: ICD-10-CM

## 2020-12-31 DIAGNOSIS — G47.33 OSA (OBSTRUCTIVE SLEEP APNEA): Primary | ICD-10-CM

## 2020-12-31 PROCEDURE — 3008F BODY MASS INDEX DOCD: CPT | Performed by: FAMILY MEDICINE

## 2020-12-31 PROCEDURE — 99244 OFF/OP CNSLTJ NEW/EST MOD 40: CPT | Performed by: PSYCHIATRY & NEUROLOGY

## 2021-01-06 ENCOUNTER — TELEPHONE (OUTPATIENT)
Dept: SLEEP CENTER | Facility: CLINIC | Age: 61
End: 2021-01-06

## 2021-01-13 ENCOUNTER — CLINICAL SUPPORT (OUTPATIENT)
Dept: NUTRITION | Facility: HOSPITAL | Age: 61
End: 2021-01-13
Payer: COMMERCIAL

## 2021-01-13 VITALS — HEIGHT: 65 IN | BODY MASS INDEX: 28.99 KG/M2 | WEIGHT: 174 LBS

## 2021-01-13 DIAGNOSIS — R73.03 PREDIABETES: ICD-10-CM

## 2021-01-13 DIAGNOSIS — E78.00 HYPERCHOLESTEROLEMIA: ICD-10-CM

## 2021-01-13 DIAGNOSIS — Z00.00 ANNUAL PHYSICAL EXAM: ICD-10-CM

## 2021-01-13 DIAGNOSIS — E66.09 CLASS 1 OBESITY DUE TO EXCESS CALORIES WITH SERIOUS COMORBIDITY AND BODY MASS INDEX (BMI) OF 30.0 TO 30.9 IN ADULT: ICD-10-CM

## 2021-01-13 PROCEDURE — 97802 MEDICAL NUTRITION INDIV IN: CPT | Performed by: DIETITIAN, REGISTERED

## 2021-01-13 NOTE — PROGRESS NOTES
Initial Nutrition Assessment Form    Patient Name: Antony Gaitan    YOB: 1960    Sex: Female     Assessment Date: 1/13/2021  Start Time: 1:18p Stop Time: 2:14p Total Minutes: 61     Data:  Present at session: self   Parent/Patient Concerns: "overweight, pre-diabetic, recent dx of sleep apnea"   Medical Dx/Reason for Referral: E66 09, Z68 30 Class 1 obesity, BMI 30-30 9 in adult  R73 03 Prediabetes  E78 00 hypercholesterolemia   Past Medical History:   Diagnosis Date    Asthma     Encounter for surveillance of contraceptives, unspecified 07/30/2013    H/O mammogram     Papanicolaou smear 04/02/2018    NEG    Plantar fasciitis 08/09/2017    Resolved:  November 24, 2017    Post-menopausal        Current Outpatient Medications   Medication Sig Dispense Refill    albuterol (Ventolin HFA) 90 mcg/act inhaler 1-2 puffs by mouth every 4 hours as needed for wheezing 3 each 3    fexofenadine (ALLEGRA) 180 MG tablet Take 1 tablet by mouth as needed      fexofenadine-pseudoephedrine (ALLEGRA-D)  MG per tablet Take 1 tablet by mouth      fluticasone (FLONASE) 50 mcg/act nasal spray 2 sprays into each nostril daily 1 Bottle 0    fluticasone-salmeterol (Advair Diskus) 250-50 mcg/dose inhaler Inhale 1 puff 2 (two) times a day 3 Inhaler 3    montelukast (SINGULAIR) 10 mg tablet TAKE 1 TABLET DAILY 90 tablet 3     No current facility-administered medications for this visit           Additional Meds/Supplements: MVI   Special Learning Needs: none   Height: 5'4 5"   Weight: Wt Readings from Last 10 Encounters:   12/31/20 80 3 kg (177 lb)   11/16/20 78 5 kg (173 lb)   11/12/20 78 7 kg (173 lb 8 oz)   07/14/20 76 7 kg (169 lb)   10/14/19 76 2 kg (168 lb)   10/10/19 76 2 kg (168 lb)   04/05/19 80 5 kg (177 lb 8 oz)   03/05/19 77 6 kg (171 lb)   11/26/18 70 3 kg (155 lb)   03/23/18 69 4 kg (153 lb)     Estimated body mass index is 30 38 kg/m² as calculated from the following:    Height as of 12/31/20: 5' 4" (1 626 m)  Weight as of 12/31/20: 80 3 kg (177 lb)  Recent Weight Change: []Yes     [x]No  Amount:       Energy Needs: [address] Equation:  2758-2095 calories (sedentary activity factor)   Allergies   Allergen Reactions    Other      seasonal       Social History     Substance and Sexual Activity   Alcohol Use Yes    Frequency: 2-4 times a month    Drinks per session: 1 or 2    Comment: social       Social History     Tobacco Use   Smoking Status Never Smoker   Smokeless Tobacco Never Used       Who shops? patient   Who cooks? patient   Exercise:  at home exercise; V-shred (HIIT), walking, hiking, kayaking in the summer, tredmill, biking in the summer  Prior Counseling? []Yes     [x]No  When:      Why:         Diet Hx:  Breakfast:    English muffin with PB (1tbsp), coffee, 2 splenda and cream (1tbsp), oatmeal with PB  chobani flip yogurt (cheesecake flavor)  6 a m  Lunch:    [place] ([facility]) sandwich (1/2), chips, cup of crab soup (traveling)  Chick rajendra a sandwich (takes chicken arielle and puts on salad), french fries, Diet soda    Salad- spring mix, tomatoes, cucumnets, peppers, 2T vinaigrette dressing  12:45 p m  Dinner:    [place] roasted potatoes, zucchini, tacos, refried beans with cheese, brown rice, leftover turkey sandwich (1/2)  5-6 p m           Snacks:  Pretzels, yogurt    Beverages: water, tea, diet soda (occasionally) Snacks usually between breakfast and lunch        Nutrition Diagnosis:   Overweight/obesity  related to Physical inactivity, intake of low fiber, calorically dense foods  as evidenced by  BMI more than normative standard for age and sex (overweight 25-29 9), A1c 6 1%, total cholesterol 224,         Medical Nutrition Therapy Intervention:  [x]Individualized Meal Plan: 3829-3343 calories daily []Understanding Lab Values   []Basic Pathophysiology of Disease []Food/Medication Interactions   [x]Food Diary: patient brought 3 day food recall with her to appointment and was able to provide details on her daily intake  Recommended to track daily calories using [employer] dennis on phone  [x]Exercise:  Recommend 150 minutes of moderate intensity exercise per week (or 30 minutes of moderate intensity exercise x5 days per week)   [x]Lifestyle/Behavior Modification Techniques:  Mindful eating techniques: slowing down rate of eating to 30 minutes per meal; waiting 20 minutes and drinking a glass of water before getting 2nd helping of food item; focus on protein source and vegetable when getting 2nd helping vs carbohydrate  Reviewed 6 pillars of Lifestyle Medicine: PB diet/nutrition, physical activity, sleep, stress management, fostering healthy relationships/positive psychology, and harmful substance avoidance  Reviewed [facility] diet: Choose fiber-filled, nutrient-dense whole plant foods to fill half to 3/4 of your plate, including Vegetables and fruits, Legumes, whole grains, nuts/seeds; Limit/avoid sugary drinks/beverages, processed meats and snacks, red meats/poultry/eggs/high-fat dairy    Portion control: Measure portions as often as possible for accurate calorie counting using measuring spoons/cups as able, use hands to estimate portions if kitchen utensils are not available;  reduce CHO servings to 1/3-1/4 cup  []Medication, Mechanism of Action   []Label Reading []Self Blood Glucose Monitoring   [x]Weight/BMI Goals: pt states that her goal weight is ~140#, which is about a 30# loss  She understands this is a long term goal, however a very achievable one  []Other -    Other Notes:    NKFA  Social alcohol consumption  Recreational drug use- NO  Sleep habits: 4-5h per night  Not a restful sleep; will be in bed by 10P, however will fall asleep on couch around 7:30-8P  Up by 5A  Afternoon, around 2p, feels sluggish           Comprehension: []Excellent  []Very Good  [x]Good  []Fair   []Poor    Receptivity: []Excellent  []Very Good  [x]Good  []Fair   []Poor    Expected Compliance: []Excellent  []Very Good  [x]Good  []Fair   []Poor        Goals:  1  Jovan Rivers will make 1/2-3/4 of her plate non-starchy vegetables at least 5x per week   2  Jovan Rivers will incorporate her current exercise videos (V-shred) to meet the recommendation for exercise of 30min, x5 per week   3  Jovan Rivers will facilitate weight loss to 156# (10% loss) by next follow up in June 2021  [address] will increase her intake of fiber by adding 1/2 cooked whole grains to her dinner meal daily  5  Jovan Rivers will increase her protein intake at breakfast by adding low fat-dairy (yogurt and cottage cheese) to her meal x3 per week       No follow-ups on file    Labs:  CMP  Lab Results   Component Value Date     10/24/2014    K 4 1 11/18/2020     11/18/2020    CO2 28 11/18/2020    ANIONGAP 8 10/24/2014    BUN 11 11/18/2020    CREATININE 1 03 11/18/2020    GLUCOSE 77 10/24/2014    GLUF 98 11/18/2020    CALCIUM 9 4 11/18/2020    AST 22 11/18/2020    ALT 30 11/18/2020    ALKPHOS 83 11/18/2020    PROT 7 5 10/24/2014    BILITOT 0 34 10/24/2014    EGFR 60 11/18/2020       BMP  Lab Results   Component Value Date    GLUCOSE 77 10/24/2014    CALCIUM 9 4 11/18/2020     10/24/2014    K 4 1 11/18/2020    CO2 28 11/18/2020     11/18/2020    BUN 11 11/18/2020    CREATININE 1 03 11/18/2020       Lipids  Lab Results   Component Value Date    CHOL 220 10/24/2014     Lab Results   Component Value Date    HDL 71 11/18/2020    HDL 80 (H) 11/16/2018    HDL 89 (H) 08/10/2017     Lab Results   Component Value Date    LDLCALC 135 (H) 11/18/2020    LDLCALC 105 (H) 11/16/2018    LDLCALC 142 (H) 08/10/2017     Lab Results   Component Value Date    TRIG 91 11/18/2020    TRIG 52 11/16/2018    TRIG 91 08/10/2017     No results found for: CHOLHDL    Hemoglobin A1C  Lab Results   Component Value Date    HGBA1C 6 1 (H) 11/18/2020       Fasting Glucose  Lab Results   Component Value Date    GLUF 98 11/18/2020       Insulin     Thyroid  No results found for: TSH, Corby Jean Paul    Hepatic Function Panel  Lab Results   Component Value Date    ALT 30 11/18/2020    AST 22 11/18/2020    ALKPHOS 83 11/18/2020    BILITOT 0 34 10/24/2014       Celiac Disease Antibody Panel  No results found for: ENDOMYSIAL IGA, GLIADIN IGA, GLIADIN IGG, IGA, TISSUE TRANSGLUT AB, TTG IGA   Iron  No results found for: IRON, TIBC, FERRITIN    Vitamins  No results found for: VITAMIN B2   No results found for: NICOTINAMIDE, NICOTINIC ACID   No results found for: VITAMINB6  No results found for: HKOFHAKO21  No results found for: VITB5  No results found for: I2OBGKYW  No results found for: THYROGLB  No results found for: VITAMIN K   No results found for: 25-HYDROXY VIT D   No components found for: VITAMINE     July Duff MS, RD, DipJAMESM, LDN  [address]  [email or web address]  [address]  [address] [address]

## 2021-01-13 NOTE — PATIENT INSTRUCTIONS
Your First Steps to Health Restoration! Lifestyle Medicine is the use of evidenced-based therapies such as a whole food, plant-predominant dietary lifestyle, regular physical activity, restorative sleep, stress management, avoidance of risky substances and positive social connection for the treatment of chronic disease  Decrease meat, processed and fast foods and refined sugar: Choose fiber-filled, nutrient-dense whole plant foods to fill half to 3/4 of your plate  Drink water to quench your thirst    Spend less time sitting: Build more activity into your daily life, such as parking farther away or always taking the stairs  Choose fitness activities you enjoy, such as walking, moving in water or biking  Build up slowly, with a goal of at least 30 minutes, 5x per week  Distance yourself from stressful situations: Take time  Breathe- try meditation, yoga, and spending time in nature  Recognize stress that leads to improved health and productivity vs stress that leads to anxiety, depression, obesity, immune dysfunction and more  Avoid screen time at least 90 minutes before bedtime: Develop an evening routine to relax  Aim for 7-9 hours of sleep per night  For the best quality of sleep, make your room cool, dark, quiet, and comfortable  Reduce or eliminate smoking, vaping, and alcohol intake: Substitute other relaxing activities and talk to your physician if you need help  Understand the well-documented dangers of any addictive substance can increase risk for many cancers and heart disease  Avoid isolation from friends and family: Spend time with those who lift your spirits and help others when you feel down  Set regular times to engage with others  Social connectedness is essential to emotional resiliency         (Adapted from [address], 2021)

## 2021-01-19 ENCOUNTER — TELEPHONE (OUTPATIENT)
Dept: SLEEP CENTER | Facility: CLINIC | Age: 61
End: 2021-01-19

## 2021-02-08 ENCOUNTER — CLINICAL SUPPORT (OUTPATIENT)
Dept: FAMILY MEDICINE CLINIC | Facility: CLINIC | Age: 61
End: 2021-02-08

## 2021-02-08 DIAGNOSIS — Z23 ENCOUNTER FOR IMMUNIZATION: Primary | ICD-10-CM

## 2021-02-08 PROCEDURE — 90750 HZV VACC RECOMBINANT IM: CPT

## 2021-02-08 PROCEDURE — 90471 IMMUNIZATION ADMIN: CPT

## 2021-03-01 DIAGNOSIS — Z23 ENCOUNTER FOR IMMUNIZATION: ICD-10-CM

## 2021-03-30 ENCOUNTER — TELEPHONE (OUTPATIENT)
Dept: FAMILY MEDICINE CLINIC | Facility: CLINIC | Age: 61
End: 2021-03-30

## 2021-03-31 DIAGNOSIS — J45.40 MODERATE PERSISTENT ASTHMA WITHOUT COMPLICATION: ICD-10-CM

## 2021-03-31 RX ORDER — ALBUTEROL SULFATE 90 UG/1
AEROSOL, METERED RESPIRATORY (INHALATION)
Qty: 3 EACH | Refills: 3 | Status: SHIPPED | OUTPATIENT
Start: 2021-03-31

## 2021-03-31 NOTE — TELEPHONE ENCOUNTER
Sent to  for approval   (duplicate message made  There was already a message in in basket about rx)

## 2021-04-08 ENCOUNTER — OFFICE VISIT (OUTPATIENT)
Dept: SLEEP CENTER | Facility: CLINIC | Age: 61
End: 2021-04-08
Payer: COMMERCIAL

## 2021-04-08 VITALS
DIASTOLIC BLOOD PRESSURE: 70 MMHG | HEIGHT: 65 IN | SYSTOLIC BLOOD PRESSURE: 120 MMHG | HEART RATE: 81 BPM | BODY MASS INDEX: 28.82 KG/M2 | WEIGHT: 173 LBS

## 2021-04-08 DIAGNOSIS — G47.33 OSA (OBSTRUCTIVE SLEEP APNEA): Primary | ICD-10-CM

## 2021-04-08 PROCEDURE — 3008F BODY MASS INDEX DOCD: CPT | Performed by: PSYCHIATRY & NEUROLOGY

## 2021-04-08 PROCEDURE — 99214 OFFICE O/P EST MOD 30 MIN: CPT | Performed by: PSYCHIATRY & NEUROLOGY

## 2021-04-08 PROCEDURE — 1036F TOBACCO NON-USER: CPT | Performed by: PSYCHIATRY & NEUROLOGY

## 2021-04-08 NOTE — PROGRESS NOTES
Sleep Medicine Follow-Up Note    HPI: 65yo F with SHANE presenting for a compliance visit  Treatment Summary: HST 2020: respiratory event index (LYNDSEY) of 7 5   APAP 5-15cm started  HPI:   Today, patient presents unaccompanied  She has lost 4lbs since last visit  The patient reports that she feels some days she is sleeping better with her CPAP  She does say it is cumbersome  She feels that when she moves the hose moves and this makes her have to make sure its okay  She is looking into an oral appliance  Treatment: APAP  -Pressure: 5-15cm   -Pressure intolerance: no   -Aerophagia: no   -Air Hunger: no  -Interface: FFM  -Fit: good, but moves when she moves  -Chin strap: no  -HCC: YME  -Patient's perceived outcome: helps her sleep better some days    Compliance Card Data:    - Date Range: 3/9/21-21   - Settin-15cm   - Pressure: Median 6 2; 90th%ile = 9 5   - Residual AHI: 4   - %  Night in Large Leak: 4 5m   - Average usage days used: 3h 43m   - % Days used: 73%   - % Days with Usage > 4 hrs: 33%    Respiratory:  -Ongoing Snoring: no  -Mouth Breathing: yes  -Dry Mouth: occasionally, not most nights  -Nocturnal Gasping: no    ROS:   Genitourinary none   Cardiology none   Gastrointestinal none   Neurology none   Constitutional weight change   Integumentary rash or dry skin   Psychiatry none   Musculoskeletal none   Pulmonary snoring   ENT none   Endocrine none   Hematological none       Sleep Pattern:  -Position: side  -Bedtime: 10pm  -Lights Out: same time  -Environment: no Lights/TV  -Latency: 30-50 mins  -Awakenings: 1-2  -Wake Time: 5am  -Rise Time: same time  -Patient's estimate of Sleep Time: 3-6h  Sometimes she will go to bed later or wakes up earlier      Daytime Symptoms:  -Upon Awakening: sometimes rested and   -Daytime fatigue/sleepiness: fine most of the day   Late afternoon she will be tired  -Naps: no  -Involuntary Dozing: sometimes if she sits   -Cognitive Symptoms: denied  -Driving: Difficulty with sleepiness and driving:  A little tired  -- Close calls related to sleepiness: no   -- Accidents related to sleepiness: no    Substance Use:  -Caffeine: 1 cup of coffee in the morning and sometimes one in the afternoon (4-5pm)  -Alcohol: occasionally  -THC: no    --> Denies any significant medical changes since last visit  --> Supplemental Oxygen Use: denies    Questionnaire:  Sitting and reading: High chance of dozing  Watching TV: High chance of dozing  Sitting, inactive in a public place (e g  a theatre or a meeting): Slight chance of dozing  As a passenger in a car for an hour without a break: Slight chance of dozing  Lying down to rest in the afternoon when circumstances permit: Slight chance of dozing  Sitting and talking to someone: Would never doze  Sitting quietly after a lunch without alcohol: Slight chance of dozing  In a car, while stopped for a few minutes in traffic: Slight chance of dozing  Total score: 11      PE:    /70   Pulse 81   Ht 5' 4 5" (1 638 m)   Wt 78 5 kg (173 lb)   LMP  (LMP Unknown)   BMI 29 24 kg/m²     General:  In NAD  Pul: Respirations: unlaboured  MS: No atrophy  Neuro: No resting tremor  Gait normal turning & station; unremarkable overall  Psych: Socially appropriate  Pleasant  No overt dysphoria  Assessment: The patient has not been compliant with her CPAP due to not always having enough hours of sleep  her obstructive events are well controlled with a residual AHI 4  She continues to derive benefit from using her CPAP as she is less tired than she was in the past and no longer snoring  She is not always able to sleep over 4 hours due to her sleep hygiene  She is in bed reading a while and warm milk seems to help  She is going to work on sleep hygiene and compliance with sleep and PAP extension  She is going to look into getting an oral appliance and wishes to use the CPAP until that time    Will not make any changes to her pressure today  Recommendations:    1) APAP at 5-15cm with FFM, PAP and sleep extension  2) Safe driving reviewed  3) Follow-up 3 months  4) Call with any questions or concerns  5) list of dentists given  All questions answered for the patient , who indicated understanding and agreed with the plan       Scott Quinn MD  Psychiatry/ Sleep medicine

## 2021-04-08 NOTE — PATIENT INSTRUCTIONS
Recommendations:    1) APAP at 5-15cm with FFM, PAP and sleep extension  2) Safe driving reviewed  3) Follow-up 3 months  4) Call with any questions or concerns  5) list of dentists given

## 2021-04-09 ENCOUNTER — TELEPHONE (OUTPATIENT)
Dept: SLEEP CENTER | Facility: CLINIC | Age: 61
End: 2021-04-09

## 2021-04-09 NOTE — TELEPHONE ENCOUNTER
FYI: [facility] called  Albuterol Ventolin HFA is not covered under patient's insurance plan  They are replacing it with Pro Air HFA

## 2021-04-20 ENCOUNTER — TELEPHONE (OUTPATIENT)
Dept: SLEEP CENTER | Facility: CLINIC | Age: 61
End: 2021-04-20

## 2021-04-20 NOTE — TELEPHONE ENCOUNTER
Patient left voice message requesting her records be sent to dentist Dr Arabella Hoffman in [place]  Email address provided by patient  Rx for mandibular appliance, office notes and sleep study results emailed to [employer]  Patient made aware that information sent

## 2021-05-10 ENCOUNTER — TELEPHONE (OUTPATIENT)
Dept: SLEEP CENTER | Facility: CLINIC | Age: 61
End: 2021-05-10

## 2021-05-10 DIAGNOSIS — G47.33 OSA (OBSTRUCTIVE SLEEP APNEA): Primary | ICD-10-CM

## 2021-07-15 ENCOUNTER — OFFICE VISIT (OUTPATIENT)
Dept: SLEEP CENTER | Facility: CLINIC | Age: 61
End: 2021-07-15
Payer: COMMERCIAL

## 2021-07-15 VITALS
HEART RATE: 78 BPM | BODY MASS INDEX: 28.82 KG/M2 | HEIGHT: 65 IN | SYSTOLIC BLOOD PRESSURE: 112 MMHG | WEIGHT: 173 LBS | DIASTOLIC BLOOD PRESSURE: 70 MMHG

## 2021-07-15 DIAGNOSIS — G47.33 OSA (OBSTRUCTIVE SLEEP APNEA): Primary | ICD-10-CM

## 2021-07-15 PROCEDURE — 1036F TOBACCO NON-USER: CPT | Performed by: PSYCHIATRY & NEUROLOGY

## 2021-07-15 PROCEDURE — 99214 OFFICE O/P EST MOD 30 MIN: CPT | Performed by: PSYCHIATRY & NEUROLOGY

## 2021-07-15 PROCEDURE — 3008F BODY MASS INDEX DOCD: CPT | Performed by: PSYCHIATRY & NEUROLOGY

## 2021-07-15 NOTE — PATIENT INSTRUCTIONS
Recommendations:    1) stop CPAP and will file it with [employer] for the recall and get on an oral appliance in the next 6 months  2) Safe driving reviewed in detail  3) Follow-up 9-12 months  4) Call with any questions or concerns  Continuous Positive Airway Pressure (CPAP) therapy was prescribed to you as a medical necessity and there are risks to discontinuing use of the device, some of which may be long term  Symptoms you experienced before using CPAP may return such as snoring, apneas, excessive daytime sleepiness, hypertension, cardiac arrythmias, risk of stroke, congestive heart-failure, exacerbation of COPD and potential respiratory failure  Ultimately, it is a personal decision for you to make if you continue use of an affected device or discontinue until a replacement is provided  Unfortunately, [employer] has not yet provided us with information about available devices  According to [employer], potential risks of continuing to use an affected device include irritation of skin, eyes and respiratory tract, inflammatory response, headache, asthma, adverse effects to other organs such as kidneys and liver and toxic carcinogenic effects

## 2021-07-15 NOTE — PROGRESS NOTES
Sleep Medicine Follow-Up Note    HPI: 63yo F with SHANE presenting for a compliance visit  Treatment Summary: HST 2020: respiratory event index (LYNDSEY) of 7 5   APAP 5-15cm started  HPI:   Today, patient presents unaccompanied  She went on vacation in  for a week and did not take her machine with her  She then was using it again  When she does use her CPAP, she does usually have better sleep  She usually has less interrupted sleep and doesn't snore  She is doing invisible for now for the next 5 months and then will be getting an oral appliance  Treatment: APAP  -Pressure: 5-15cm   -Pressure intolerance: no   -Aerophagia: no   -Air Hunger: no  -Interface: FFM  -Fit: good, but moves when she moves  -Chin strap: no  -HCC: YME  -Patient's perceived outcome: see HPI    Compliance Card Data:    - Date Range: 21-21   - Settin-15cm   - Pressure: Median 6 2; 90th%ile = 8 8   - Residual AHI: 4   - %  Night in Large Leak: 17 sec   - Average usage days used: 4h 9m   - % Days used: 47%   - % Days with Usage > 4 hrs: 23%    Respiratory:  -Ongoing Snoring: no  -Mouth Breathing: yes  -Dry Mouth: only recently has had a dry mouth  -Nocturnal Gasping: no    ROS:   Genitourinary none   Cardiology none   Gastrointestinal none   Neurology none   Constitutional fatigue and weight change   Integumentary rash or dry skin   Psychiatry none   Musculoskeletal none   Pulmonary snoring   ENT none   Endocrine frequent urination   Hematological none       Sleep Pattern:  -Position: side  -Bedtime: 10pm  -Lights Out: same time  -Environment: no Lights/TV  -Latency: 60 mins  -Awakenings: 1-2  -Wake Time: 5-530am  -Rise Time: same time  -Patient's estimate of Sleep Time: 4-6h  Daytime Symptoms:  -Upon Awakening: sometimes rested and pretty good  -Daytime fatigue/sleepiness:  Late afternoon she will be tired between 2-4pm  Can be sleepy     -Naps: no  -Involuntary Dozing: sometimes if she sits down after work   -Cognitive Symptoms: denied  -Driving: Difficulty with sleepiness and driving:  A little tired in the afternoon  -- Close calls related to sleepiness: no   -- Accidents related to sleepiness: no    Substance Use:  -Caffeine: 1 cup of coffee in the morning   -Alcohol: occasionally  -THC: no    --> Denies any significant medical changes since last visit  --> Supplemental Oxygen Use: denies    Questionnaire:  Sitting and reading: Moderate chance of dozing  Watching TV: Moderate chance of dozing  Sitting, inactive in a public place (e g  a theatre or a meeting): Would never doze  As a passenger in a car for an hour without a break: Slight chance of dozing  Lying down to rest in the afternoon when circumstances permit: Slight chance of dozing  Sitting and talking to someone: Would never doze  Sitting quietly after a lunch without alcohol: Would never doze  In a car, while stopped for a few minutes in traffic: Slight chance of dozing  Total score: 7      PE:    /70   Pulse 78   Ht 5' 4 5" (1 638 m)   Wt 78 5 kg (173 lb)   LMP  (LMP Unknown)   BMI 29 24 kg/m²     General:  In NAD  Pul: Respirations: unlaboured  MS: No atrophy  Neuro: No resting tremor  Gait normal turning & station; unremarkable overall  Psych: Socially appropriate  Pleasant  No overt dysphoria  Assessment: The patient has not been compliant with her CPAP due to not always having enough hours of sleep and vacation  Her obstructive events are well controlled with a residual AHI 4  She continues to derive benefit from using her CPAP as she is less tired than she was in the past and no longer snoring  We discussed the below recall information and she has decided to stop using the CPAP for now and will resister it with [facility]  She is on the way to getting an oral appliance and is working with a dentist who has given her invisiline for 5 months prior to OAT        Continuous Positive Airway Pressure (CPAP) therapy was prescribed to you as a medical necessity and there are risks to discontinuing use of the device, some of which may be long term  Symptoms you experienced before using CPAP may return such as snoring, apneas, excessive daytime sleepiness, hypertension, cardiac arrythmias, risk of stroke, congestive heart-failure, exacerbation of COPD and potential respiratory failure  Ultimately, it is a personal decision for you to make if you continue use of an affected device or discontinue until a replacement is provided  Unfortunately, [employer] has not yet provided us with information about available devices  According to [employer], potential risks of continuing to use an affected device include irritation of skin, eyes and respiratory tract, inflammatory response, headache, asthma, adverse effects to other organs such as kidneys and liver and toxic carcinogenic effects  Recommendations:    1) stop CPAP and will file it with [employer] for the recall and get on an oral appliance in the next 6 months  2) Safe driving reviewed in detail  3) Follow-up 9-12 months  4) Call with any questions or concerns  All questions answered for the patient , who indicated understanding and agreed with the plan       Lyla Branhart MD  Psychiatry/ Sleep medicine

## 2021-08-13 ENCOUNTER — ANNUAL EXAM (OUTPATIENT)
Dept: OBGYN CLINIC | Facility: CLINIC | Age: 61
End: 2021-08-13
Payer: COMMERCIAL

## 2021-08-13 VITALS
WEIGHT: 169 LBS | HEIGHT: 65 IN | DIASTOLIC BLOOD PRESSURE: 82 MMHG | BODY MASS INDEX: 28.16 KG/M2 | SYSTOLIC BLOOD PRESSURE: 130 MMHG

## 2021-08-13 DIAGNOSIS — Z11.51 SCREENING FOR HPV (HUMAN PAPILLOMAVIRUS): ICD-10-CM

## 2021-08-13 DIAGNOSIS — Z78.0 POSTMENOPAUSAL: ICD-10-CM

## 2021-08-13 DIAGNOSIS — Z01.419 ENCOUNTER FOR WELL WOMAN EXAM: Primary | ICD-10-CM

## 2021-08-13 DIAGNOSIS — Z12.31 ENCOUNTER FOR SCREENING MAMMOGRAM FOR BREAST CANCER: ICD-10-CM

## 2021-08-13 DIAGNOSIS — Z12.39 ENCOUNTER FOR SCREENING BREAST EXAMINATION: ICD-10-CM

## 2021-08-13 DIAGNOSIS — Z01.419 ROUTINE GYNECOLOGICAL EXAMINATION: ICD-10-CM

## 2021-08-13 PROCEDURE — S0612 ANNUAL GYNECOLOGICAL EXAMINA: HCPCS | Performed by: PHYSICIAN ASSISTANT

## 2021-08-13 PROCEDURE — 3008F BODY MASS INDEX DOCD: CPT | Performed by: PSYCHIATRY & NEUROLOGY

## 2021-08-13 PROCEDURE — G0476 HPV COMBO ASSAY CA SCREEN: HCPCS | Performed by: PHYSICIAN ASSISTANT

## 2021-08-13 PROCEDURE — G0145 SCR C/V CYTO,THINLAYER,RESCR: HCPCS | Performed by: PHYSICIAN ASSISTANT

## 2021-08-13 NOTE — PROGRESS NOTES
Assessment/Plan:    No problem-specific Assessment & Plan notes found for this encounter  Diagnoses and all orders for this visit:    Encounter for well woman exam    Routine gynecological examination  -     Liquid-based pap, screening    Encounter for screening breast examination    Encounter for screening mammogram for breast cancer  -     Mammo screening bilateral w 3d & cad; Future    Screening for HPV (human papillomavirus)  -     Liquid-based pap, screening    Postmenopausal          Subjective:      Patient ID: Vic Restrepo is a 64 y o  female  Pt presents for her annual exam today--  She has no complaints  No changes  She has no bleeding or pelvic pain--postmeno  Bowel and bladder are regular  Colonoscopy--2018  No breast concerns today  Last mammo--2020      pap today  rx mammo  Daily mvi      The following portions of the patient's history were reviewed and updated as appropriate: allergies, current medications, past family history, past medical history, past social history, past surgical history and problem list     Review of Systems   Constitutional: Negative for chills, fever and unexpected weight change  Gastrointestinal: Negative for abdominal pain, blood in stool, constipation and diarrhea  Genitourinary: Negative  Objective:      /82   Ht 5' 5 35" (1 66 m)   Wt 76 7 kg (169 lb)   LMP  (LMP Unknown)   BMI 27 82 kg/m²          Physical Exam  Vitals and nursing note reviewed  Constitutional:       Appearance: She is well-developed  HENT:      Head: Normocephalic and atraumatic  Chest:      Breasts:         Right: No inverted nipple, mass, nipple discharge or skin change  Left: No inverted nipple, mass, nipple discharge or skin change  Abdominal:      Palpations: Abdomen is soft  Genitourinary:     Exam position: Supine  Labia:         Right: No rash, tenderness or lesion  Left: No rash, tenderness or lesion         Vagina: Normal  Cervix: No cervical motion tenderness, discharge or friability  Adnexa:         Right: No mass, tenderness or fullness  Left: No mass, tenderness or fullness  Musculoskeletal:      Cervical back: Normal range of motion  Lymphadenopathy:      Lower Body: No right inguinal adenopathy  No left inguinal adenopathy

## 2021-08-13 NOTE — PROGRESS NOTES
The patient is here for a yearly  She is due for a pap smear  4/2/18 Normal Pap, Negative HPV  3/29/17 Normal Pap  No bleeding or cramping  No vaginal, bowel, bladder or breast problems

## 2021-08-16 ENCOUNTER — CLINICAL SUPPORT (OUTPATIENT)
Dept: NUTRITION | Facility: HOSPITAL | Age: 61
End: 2021-08-16
Payer: COMMERCIAL

## 2021-08-16 DIAGNOSIS — E78.00 HYPERCHOLESTEROLEMIA: ICD-10-CM

## 2021-08-16 PROCEDURE — 97803 MED NUTRITION INDIV SUBSEQ: CPT | Performed by: DIETITIAN, REGISTERED

## 2021-08-16 NOTE — PROGRESS NOTES
Follow-Up Nutrition Assessment Form    Patient Name: Bobby Hickey    YOB: 1960    Sex: Female      Follow Up Date: 8/16/2021  Start Time: 3:30 Stop Time: 3:55 Total Minutes: 25     Data:  Present at session: self   Parent/Patient Concerns: " things have been just okay"   Medical Dx/Reason for Referral:  E78 00 hypercholesterolemia  R73 03 prediabetes  E66 09, Z68 30 class 1 obesity, BMI 30-30 9 in adult   Past Medical History:   Diagnosis Date    Asthma     Encounter for surveillance of contraceptives, unspecified 07/30/2013    H/O mammogram     Papanicolaou smear 04/02/2018    NEG    Plantar fasciitis 08/09/2017    Resolved:  November 24, 2017    Post-menopausal        Current Outpatient Medications   Medication Sig Dispense Refill    albuterol (Ventolin HFA) 90 mcg/act inhaler 1-2 puffs by mouth every 4 hours as needed for wheezing 3 each 3    fexofenadine (ALLEGRA) 180 MG tablet Take 1 tablet by mouth as needed      fexofenadine-pseudoephedrine (ALLEGRA-D)  MG per tablet Take 1 tablet by mouth      fluticasone (FLONASE) 50 mcg/act nasal spray 2 sprays into each nostril daily 1 Bottle 0    fluticasone-salmeterol (Advair Diskus) 250-50 mcg/dose inhaler Inhale 1 puff 2 (two) times a day 3 Inhaler 3    montelukast (SINGULAIR) 10 mg tablet TAKE 1 TABLET DAILY 90 tablet 3     No current facility-administered medications for this visit          Additional Meds/Supplements: Whey protein   Barriers to Learning: None   Labs: No new labs for assessment   Height: Ht Readings from Last 3 Encounters:   08/13/21 5' 5 35" (1 66 m)   07/15/21 5' 4 5" (1 638 m)   04/08/21 5' 4 5" (1 638 m)      Weight: Wt Readings from Last 10 Encounters:   08/13/21 76 7 kg (169 lb)   07/15/21 78 5 kg (173 lb)   04/08/21 78 5 kg (173 lb)   01/13/21 78 9 kg (174 lb)   12/31/20 80 3 kg (177 lb)   11/16/20 78 5 kg (173 lb)   11/12/20 78 7 kg (173 lb 8 oz)   07/14/20 76 7 kg (169 lb)   10/14/19 76 2 kg (168 lb) 10/10/19 76 2 kg (168 lb)     Estimated body mass index is 27 82 kg/m² as calculated from the following:    Height as of 8/13/21: 5' 5 35" (1 66 m)  Weight as of 8/13/21: 76 7 kg (169 lb)  Wt  Change Since Last Visit: [x]Yes     []No  Amount: Weight has been trended downward      Energy Needs: [facility] Equation:  [id number] calories   Pain Screen: Are you having pain now? No      Previous goals:  1  Leopold Kiran will make 1/2-3/4 of her plate non-starchy vegetables at least 5x per week   2  Leopold Kiran will incorporate her current exercise videos ([employer]) to meet the recommendation for exercise of 30min, x5 per week   3  Leopold Kiran will facilitate weight loss to 156# by next follow up in December 2021  [address] will increase her intake of fiber by adding 1/2 cooked whole grains to her dinner meal daily  5  Leopold Class will increase her protein intake at breakfast by adding low fat-dairy (yogurt and cottage cheese) to her meal x3 per week       Goals remain the same from previous assessment       Initial PES:    Overweight/obesity  related to Physical inactivity, intake of low fiber, calorically dense foods  as evidenced by  BMI more than normative standard for age and sex (overweight 25-29 9), A1c 6 1%, total cholesterol 224,     New PES: No Change      Assessment:  Leopold Kiran reports that she has not been focusing on her weight loss goal d/t her daughter and extended family moving in with her and her  while they relocate   She began adjusting her intake these past few weeks, beginning of August, by reducing portion sizes, balancing CHO/protein, decreasing high-fat dairy  B: oatmeal, [employer]ik[employer] yogurts, granola cereal with skim milk, protein smoothie with whey protein scoop, weekends she will have a omelet  L: cottage cheese and apple, handful of nuts  D: fish or chicken, occasional beef, decreased portions of CHO and increased servings of veggies  Nuts for a snack  Staying around 1200 calories  Mentioned that her sleep apnea machine has been recalled therefore her sleep habits have declined  She noticed a 6-7# weight loss within the first few months of using her sleep apnea machine  Has been able to keep this weight off  She did present with weight loss today, was 169# in office, it is just not as fast as she would like  Her exercise has been intermittent  She currently has a sling and brace on her left wrist which inhibited vigorous exercise  Has been able to go for walks and hikes with her family     No new lab values to review     Medical Nutrition Therapy Intervention:  [x]Individualized Meal Plan []Understanding Lab Values   []Basic Pathophysiology of Disease []Food/Medication Interactions   []Food Diary [x]Exercise   [x]Lifestyle/Behavior Modification Techniques []Medication, Mechanism of Action   []Label Reading []Self Blood Glucose Monitoring   [x]Weight/BMI Goals []Other -    Other Notes:        Comprehension: []Excellent  []Very Good  [x]Good  []Fair   []Poor    Receptivity: []Excellent  []Very Good  [x]Good  []Fair   []Poor    Expected Compliance: []Excellent  []Very Good  [x]Good  []Fair   []Poor      Labs:  CMP  Lab Results   Component Value Date     10/24/2014    K 4 1 11/18/2020     11/18/2020    CO2 28 11/18/2020    ANIONGAP 8 10/24/2014    BUN 11 11/18/2020    CREATININE 1 03 11/18/2020    GLUCOSE 77 10/24/2014    GLUF 98 11/18/2020    CALCIUM 9 4 11/18/2020    AST 22 11/18/2020    ALT 30 11/18/2020    ALKPHOS 83 11/18/2020    PROT 7 5 10/24/2014    BILITOT 0 34 10/24/2014    EGFR 60 11/18/2020       BMP  Lab Results   Component Value Date    GLUCOSE 77 10/24/2014    CALCIUM 9 4 11/18/2020     10/24/2014    K 4 1 11/18/2020    CO2 28 11/18/2020     11/18/2020    BUN 11 11/18/2020    CREATININE 1 03 11/18/2020       Lipids  Lab Results   Component Value Date    CHOL 220 10/24/2014     Lab Results   Component Value Date    HDL 71 11/18/2020    HDL 80 (H) 11/16/2018    HDL 89 (H) 08/10/2017     Lab Results   Component Value Date    LDLCALC 135 (H) 11/18/2020    LDLCALC 105 (H) 11/16/2018    LDLCALC 142 (H) 08/10/2017     Lab Results   Component Value Date    TRIG 91 11/18/2020    TRIG 52 11/16/2018    TRIG 91 08/10/2017     No results found for: CHOLHDL    Hemoglobin A1C  Lab Results   Component Value Date    HGBA1C 6 1 (H) 11/18/2020       Fasting Glucose  Lab Results   Component Value Date    GLUF 98 11/18/2020       Insulin     Thyroid  No results found for: TSH, T6DKTWF, S1VXHXH, THYROIDAB    Hepatic Function Panel  Lab Results   Component Value Date    ALT 30 11/18/2020    AST 22 11/18/2020    ALKPHOS 83 11/18/2020    BILITOT 0 34 10/24/2014       Celiac Disease Antibody Panel  No results found for: ENDOMYSIAL IGA, GLIADIN IGA, GLIADIN IGG, IGA, TISSUE TRANSGLUT AB, TTG IGA   Iron  No results found for: IRON, TIBC, FERRITIN    Vitamins  No results found for: VITAMIN B2   No results found for: NICOTINAMIDE, NICOTINIC ACID   No results found for: VITAMINB6  No results found for: [id number]  No results found for: VITB5  No results found for: F2TLDIIO  No results found for: THYROGLB  No results found for: VITAMIN K   No results found for: 25-HYDROXY VIT D   No components found for: VITAMINE     No follow-ups on file  Geri Ontiveros MS, RD, DipACLM, LDN  Clinical Nutrition   [address] [place] [address] Avenue  [email or web address]  [address]  [address] [id number]

## 2021-08-17 LAB
HPV HR 12 DNA CVX QL NAA+PROBE: NEGATIVE
HPV16 DNA CVX QL NAA+PROBE: NEGATIVE
HPV18 DNA CVX QL NAA+PROBE: NEGATIVE

## 2021-08-19 LAB
LAB AP GYN PRIMARY INTERPRETATION: NORMAL
Lab: NORMAL

## 2021-08-23 DIAGNOSIS — J45.40 MODERATE PERSISTENT ASTHMA WITHOUT COMPLICATION: ICD-10-CM

## 2021-08-23 RX ORDER — MONTELUKAST SODIUM 10 MG/1
TABLET ORAL
Qty: 90 TABLET | Refills: 3 | Status: SHIPPED | OUTPATIENT
Start: 2021-08-23 | End: 2022-06-06 | Stop reason: SDUPTHER

## 2021-10-13 ENCOUNTER — TELEMEDICINE (OUTPATIENT)
Dept: FAMILY MEDICINE CLINIC | Facility: CLINIC | Age: 61
End: 2021-10-13
Payer: COMMERCIAL

## 2021-10-13 DIAGNOSIS — H10.13 ALLERGIC CONJUNCTIVITIS OF BOTH EYES: Primary | ICD-10-CM

## 2021-10-13 PROCEDURE — 99213 OFFICE O/P EST LOW 20 MIN: CPT | Performed by: FAMILY MEDICINE

## 2021-10-13 PROCEDURE — 1036F TOBACCO NON-USER: CPT | Performed by: FAMILY MEDICINE

## 2021-11-16 ENCOUNTER — OFFICE VISIT (OUTPATIENT)
Dept: FAMILY MEDICINE CLINIC | Facility: CLINIC | Age: 61
End: 2021-11-16
Payer: COMMERCIAL

## 2021-11-16 VITALS
HEART RATE: 74 BPM | OXYGEN SATURATION: 95 % | BODY MASS INDEX: 29.62 KG/M2 | HEIGHT: 64 IN | TEMPERATURE: 96.3 F | SYSTOLIC BLOOD PRESSURE: 110 MMHG | DIASTOLIC BLOOD PRESSURE: 70 MMHG | WEIGHT: 173.5 LBS

## 2021-11-16 DIAGNOSIS — E78.00 HYPERCHOLESTEROLEMIA: ICD-10-CM

## 2021-11-16 DIAGNOSIS — Z23 ENCOUNTER FOR IMMUNIZATION: ICD-10-CM

## 2021-11-16 DIAGNOSIS — R73.03 PREDIABETES: Primary | ICD-10-CM

## 2021-11-16 DIAGNOSIS — G47.33 OBSTRUCTIVE SLEEP APNEA: ICD-10-CM

## 2021-11-16 DIAGNOSIS — J45.40 MODERATE PERSISTENT ASTHMA WITHOUT COMPLICATION: ICD-10-CM

## 2021-11-16 PROBLEM — S52.132A: Status: ACTIVE | Noted: 2021-09-29

## 2021-11-16 PROCEDURE — 90471 IMMUNIZATION ADMIN: CPT

## 2021-11-16 PROCEDURE — 90682 RIV4 VACC RECOMBINANT DNA IM: CPT

## 2021-11-16 PROCEDURE — 90732 PPSV23 VACC 2 YRS+ SUBQ/IM: CPT

## 2021-11-16 PROCEDURE — 90472 IMMUNIZATION ADMIN EACH ADD: CPT

## 2021-11-16 PROCEDURE — 3725F SCREEN DEPRESSION PERFORMED: CPT | Performed by: PHYSICIAN ASSISTANT

## 2021-11-16 PROCEDURE — 3008F BODY MASS INDEX DOCD: CPT | Performed by: PHYSICIAN ASSISTANT

## 2021-11-16 PROCEDURE — 99396 PREV VISIT EST AGE 40-64: CPT | Performed by: PHYSICIAN ASSISTANT

## 2021-11-19 ENCOUNTER — APPOINTMENT (OUTPATIENT)
Dept: LAB | Facility: CLINIC | Age: 61
End: 2021-11-19
Payer: COMMERCIAL

## 2021-11-19 DIAGNOSIS — R73.03 PREDIABETES: ICD-10-CM

## 2021-11-19 DIAGNOSIS — E78.00 HYPERCHOLESTEROLEMIA: ICD-10-CM

## 2021-11-19 LAB
ALBUMIN SERPL BCP-MCNC: 4 G/DL (ref 3.5–5)
ALP SERPL-CCNC: 87 U/L (ref 46–116)
ALT SERPL W P-5'-P-CCNC: 23 U/L (ref 12–78)
ANION GAP SERPL CALCULATED.3IONS-SCNC: 6 MMOL/L (ref 4–13)
AST SERPL W P-5'-P-CCNC: 14 U/L (ref 5–45)
BASOPHILS # BLD AUTO: 0.08 THOUSANDS/ΜL (ref 0–0.1)
BASOPHILS NFR BLD AUTO: 1 % (ref 0–1)
BILIRUB SERPL-MCNC: 0.71 MG/DL (ref 0.2–1)
BUN SERPL-MCNC: 13 MG/DL (ref 5–25)
CALCIUM SERPL-MCNC: 9.6 MG/DL (ref 8.3–10.1)
CHLORIDE SERPL-SCNC: 108 MMOL/L (ref 100–108)
CHOLEST SERPL-MCNC: 227 MG/DL
CO2 SERPL-SCNC: 28 MMOL/L (ref 21–32)
CREAT SERPL-MCNC: 0.93 MG/DL (ref 0.6–1.3)
EOSINOPHIL # BLD AUTO: 0.65 THOUSAND/ΜL (ref 0–0.61)
EOSINOPHIL NFR BLD AUTO: 11 % (ref 0–6)
ERYTHROCYTE [DISTWIDTH] IN BLOOD BY AUTOMATED COUNT: 14.5 % (ref 11.6–15.1)
EST. AVERAGE GLUCOSE BLD GHB EST-MCNC: 126 MG/DL
GFR SERPL CREATININE-BSD FRML MDRD: 67 ML/MIN/1.73SQ M
GLUCOSE P FAST SERPL-MCNC: 92 MG/DL (ref 65–99)
HBA1C MFR BLD: 6 %
HCT VFR BLD AUTO: 44.2 % (ref 34.8–46.1)
HDLC SERPL-MCNC: 81 MG/DL
HGB BLD-MCNC: 14.1 G/DL (ref 11.5–15.4)
IMM GRANULOCYTES # BLD AUTO: 0.01 THOUSAND/UL (ref 0–0.2)
IMM GRANULOCYTES NFR BLD AUTO: 0 % (ref 0–2)
LDLC SERPL CALC-MCNC: 133 MG/DL (ref 0–100)
LYMPHOCYTES # BLD AUTO: 1.7 THOUSANDS/ΜL (ref 0.6–4.47)
LYMPHOCYTES NFR BLD AUTO: 29 % (ref 14–44)
MCH RBC QN AUTO: 28.2 PG (ref 26.8–34.3)
MCHC RBC AUTO-ENTMCNC: 31.9 G/DL (ref 31.4–37.4)
MCV RBC AUTO: 88 FL (ref 82–98)
MONOCYTES # BLD AUTO: 0.48 THOUSAND/ΜL (ref 0.17–1.22)
MONOCYTES NFR BLD AUTO: 8 % (ref 4–12)
NEUTROPHILS # BLD AUTO: 2.91 THOUSANDS/ΜL (ref 1.85–7.62)
NEUTS SEG NFR BLD AUTO: 51 % (ref 43–75)
NRBC BLD AUTO-RTO: 0 /100 WBCS
PLATELET # BLD AUTO: 277 THOUSANDS/UL (ref 149–390)
PMV BLD AUTO: 10.4 FL (ref 8.9–12.7)
POTASSIUM SERPL-SCNC: 3.9 MMOL/L (ref 3.5–5.3)
PROT SERPL-MCNC: 7.4 G/DL (ref 6.4–8.2)
RBC # BLD AUTO: 5 MILLION/UL (ref 3.81–5.12)
SODIUM SERPL-SCNC: 142 MMOL/L (ref 136–145)
TRIGL SERPL-MCNC: 63 MG/DL
WBC # BLD AUTO: 5.83 THOUSAND/UL (ref 4.31–10.16)

## 2021-11-19 PROCEDURE — 80053 COMPREHEN METABOLIC PANEL: CPT

## 2021-11-19 PROCEDURE — 85025 COMPLETE CBC W/AUTO DIFF WBC: CPT

## 2021-11-19 PROCEDURE — 83036 HEMOGLOBIN GLYCOSYLATED A1C: CPT

## 2021-11-19 PROCEDURE — 80061 LIPID PANEL: CPT

## 2021-11-19 PROCEDURE — 36415 COLL VENOUS BLD VENIPUNCTURE: CPT

## 2021-11-22 ENCOUNTER — HOSPITAL ENCOUNTER (OUTPATIENT)
Dept: RADIOLOGY | Age: 61
Discharge: HOME | End: 2021-11-22
Payer: COMMERCIAL

## 2021-11-22 VITALS — HEIGHT: 64 IN | BODY MASS INDEX: 29.62 KG/M2 | WEIGHT: 173.5 LBS

## 2021-11-22 DIAGNOSIS — Z12.31 ENCOUNTER FOR SCREENING MAMMOGRAM FOR BREAST CANCER: ICD-10-CM

## 2021-11-22 PROCEDURE — 77063 BREAST TOMOSYNTHESIS BI: CPT

## 2021-11-22 PROCEDURE — 77067 SCR MAMMO BI INCL CAD: CPT

## 2021-12-08 ENCOUNTER — CLINICAL SUPPORT (OUTPATIENT)
Dept: NUTRITION | Facility: HOSPITAL | Age: 61
End: 2021-12-08
Payer: COMMERCIAL

## 2021-12-08 DIAGNOSIS — E78.00 HYPERCHOLESTEROLEMIA: Primary | ICD-10-CM

## 2021-12-08 PROCEDURE — 97803 MED NUTRITION INDIV SUBSEQ: CPT | Performed by: DIETITIAN, REGISTERED

## 2022-01-06 ENCOUNTER — TELEMEDICINE (OUTPATIENT)
Dept: FAMILY MEDICINE CLINIC | Facility: CLINIC | Age: 62
End: 2022-01-06
Payer: COMMERCIAL

## 2022-01-06 DIAGNOSIS — J45.40 MODERATE PERSISTENT ASTHMA WITHOUT COMPLICATION: Primary | ICD-10-CM

## 2022-01-06 DIAGNOSIS — J40 BRONCHITIS: ICD-10-CM

## 2022-01-06 PROCEDURE — 99213 OFFICE O/P EST LOW 20 MIN: CPT | Performed by: PHYSICIAN ASSISTANT

## 2022-01-06 RX ORDER — AZITHROMYCIN 250 MG/1
TABLET, FILM COATED ORAL
Qty: 6 TABLET | Refills: 0 | Status: SHIPPED | OUTPATIENT
Start: 2022-01-06 | End: 2022-01-11

## 2022-01-06 NOTE — PROGRESS NOTES
COVID-19 Outpatient Progress Note    Assessment/Plan:    Problem List Items Addressed This Visit        Respiratory    Moderate persistent asthma without complication - Primary      Other Visit Diagnoses     Bronchitis        Relevant Medications    azithromycin (Zithromax) 250 mg tablet         Disposition:     After clarifying the patient's history, my suspicion for COVID-19 infection is very low  Directed to increase steroid inhaler to 2 puffs twice daily and use albuterol PRN  Pt requested antibiotics  Ordered Tiffany Browne which has been previously well tolerated  Directed to FU with worsening symptoms  No refills needed at this time    I have spent 11 minutes directly with the patient  Greater than 50% of this time was spent in counseling/coordination of care regarding: prognosis, risks and benefits of treatment options and patient and family education  Encounter provider Jimmy Tobias PA-C    Provider located at [place] [address]  [phone]    Recent Visits  No visits were found meeting these conditions  Showing recent visits within past 7 days and meeting all other requirements  Today's Visits  Date Type Provider Dept   01/06/22 Telemedicine Jimmy Tobias PA-C  [facility]   Showing today's visits and meeting all other requirements  Future Appointments  No visits were found meeting these conditions  Showing future appointments within next 150 days and meeting all other requirements     This virtual check-in was done via [employer] and patient was informed that this is a secure, HIPAA-compliant platform  She agrees to proceed  Patient agrees to participate in a virtual check in via telephone or video visit instead of presenting to the office to address urgent/immediate medical needs  Patient is aware this is a billable service  After connecting through [facility], the patient was identified by name and date of birth   Cynthia Salmeron Pilar was informed that this was a telemedicine visit and that the exam was being conducted confidentially over secure lines  My office door was closed  No one else was in the room  Shelley Rae acknowledged consent and understanding of privacy and security of the telemedicine visit  I informed the patient that I have reviewed her record in Epic and presented the opportunity for her to ask any questions regarding the visit today  The patient agreed to participate  Verification of patient location:  Patient is located in the following state in which I hold an active license: [place]    Subjective:   Shelley Rae is a 63 y o  female who is concerned about COVID-19  Patient's symptoms include cough (occasionally productive)  Patient denies fever, sore throat, anosmia, loss of taste, shortness of breath, chest tightness and headaches  Date of symptom onset: 12/20/2021  COVID-19 vaccination status: Fully vaccinated with booster    Exposure:   Contact with a person who is under investigation (PUI) for or who is positive for COVID-19 within the last 14 days?: No    Hospitalized recently for fever and/or lower respiratory symptoms?: No      Currently a healthcare worker that is involved in direct patient care?: No      Works in a special setting where the risk of COVID-19 transmission may be high? (this may include long-term care, correctional and assisted facilities; homeless shelters; assisted-living facilities and group homes ): No      Resident in a special setting where the risk of COVID-19 transmission may be high? (this may include long-term care, correctional and assisted facilities; homeless shelters; assisted-living facilities and group homes ): No      Improvement with albuterol when used every other day for the past week  Mucinex and Nyquil cold and cold at night both improve her night time mucus productiong  Uses Advair diskus 1 puff BID    Initially triggered by allergies which have resolved  No results found for: Kristy Diaz, [address] [facility], [facility] [address], [facility]  Past Medical History:   Diagnosis Date    Asthma     Encounter for surveillance of contraceptives, unspecified 07/30/2013    H/O mammogram     Papanicolaou smear 04/02/2018    NEG    Plantar fasciitis 08/09/2017    Resolved:  November 24, 2017    Post-menopausal      Past Surgical History:   Procedure Laterality Date    CARPAL TUNNEL RELEASE Bilateral 2016    COLONOSCOPY  2012    normal    MAMMO (HISTORICAL) Bilateral 09/27/2018    No evidence of malignancy    OK COLONOSCOPY FLX DX W/COLLJ SPEC WHEN PFRMD N/A 11/26/2018    Procedure: COLONOSCOPY;  Surgeon: Candelaria Infante MD;  Location: [facility]; Service: Gastroenterology    VAGINAL DELIVERY      x3     Current Outpatient Medications   Medication Sig Dispense Refill    albuterol (Ventolin HFA) 90 mcg/act inhaler 1-2 puffs by mouth every 4 hours as needed for wheezing 3 each 3    azithromycin (Zithromax) 250 mg tablet Take 2 tablets (500 mg total) by mouth daily for 1 day, THEN 1 tablet (250 mg total) daily for 4 days  6 tablet 0    fexofenadine (ALLEGRA) 180 MG tablet Take 1 tablet by mouth as needed      fexofenadine-pseudoephedrine (ALLEGRA-D)  MG per tablet Take 1 tablet by mouth      fluticasone (FLONASE) 50 mcg/act nasal spray 2 sprays into each nostril daily (Patient not taking: Reported on 11/16/2021 ) 1 Bottle 0    fluticasone-salmeterol (Advair Diskus) 250-50 mcg/dose inhaler Inhale 1 puff 2 (two) times a day 3 Inhaler 3    montelukast (SINGULAIR) 10 mg tablet TAKE 1 TABLET DAILY 90 tablet 3     No current facility-administered medications for this visit  Allergies   Allergen Reactions    Other      seasonal       Review of Systems   Constitutional: Negative for fever  HENT: Negative for sore throat  Respiratory: Positive for cough (occasionally productive) and wheezing (occasional)   Negative for chest tightness and shortness of breath  Neurological: Negative for headaches  Objective: There were no vitals filed for this visit  Physical Exam  Constitutional:       Appearance: Normal appearance  She is normal weight  Pulmonary:      Breath sounds: No wheezing  Comments: Dry cough  Neurological:      Mental Status: She is alert  Psychiatric:         Mood and Affect: Mood normal          Behavior: Behavior normal          VIRTUAL VISIT [address] verbally agrees to participate in [employer]  Pt is aware that [employer] could be limited without vital signs or the ability to perform a full hands-on physical exam  Anni Schneider understands she or the provider may request at any time to terminate the video visit and request the patient to seek care or treatment in person

## 2022-04-14 DIAGNOSIS — J45.40 MODERATE PERSISTENT ASTHMA WITHOUT COMPLICATION: ICD-10-CM

## 2022-06-06 DIAGNOSIS — J45.40 MODERATE PERSISTENT ASTHMA WITHOUT COMPLICATION: ICD-10-CM

## 2022-06-06 RX ORDER — MONTELUKAST SODIUM 10 MG/1
10 TABLET ORAL DAILY
Qty: 90 TABLET | Refills: 3 | Status: SHIPPED | OUTPATIENT
Start: 2022-06-06

## 2022-07-20 ENCOUNTER — CLINICAL SUPPORT (OUTPATIENT)
Dept: NUTRITION | Facility: HOSPITAL | Age: 62
End: 2022-07-20
Payer: COMMERCIAL

## 2022-07-20 VITALS — HEIGHT: 64 IN | BODY MASS INDEX: 31.09 KG/M2 | WEIGHT: 182.1 LBS

## 2022-07-20 DIAGNOSIS — E78.00 HYPERCHOLESTEROLEMIA: Primary | ICD-10-CM

## 2022-07-20 PROCEDURE — 97803 MED NUTRITION INDIV SUBSEQ: CPT

## 2022-07-20 NOTE — PROGRESS NOTES
Follow-Up Nutrition Assessment Form    Patient Name: Denice Ashley    YOB: 1960    Sex: Female      Follow Up Date: 7/20/2022  Start Time: 12 Stop Time: 4:00 Total Minutes: 30     Data:  Present at session: self   Patient Concerns: Wasn't able to make some changes  I can get down to 168-169lbs  Part of it was not being able to keep a steady routine  I used the gym at school and now they turned the air off  Medical Dx/Reason for Referral:   E78 00 hypercholesterolemia  R73 03 prediabetes  E66 09, Z68 30 class 1 obesity, BMI 30-30 9 in adult        Past Medical History:   Diagnosis Date    Asthma     Encounter for surveillance of contraceptives, unspecified 07/30/2013    H/O mammogram     Papanicolaou smear 04/02/2018    NEG    Plantar fasciitis 08/09/2017    Resolved:  November 24, 2017    Post-menopausal        Current Outpatient Medications   Medication Sig Dispense Refill    albuterol (Ventolin HFA) 90 mcg/act inhaler 1-2 puffs by mouth every 4 hours as needed for wheezing 3 each 3    fexofenadine (ALLEGRA) 180 MG tablet Take 1 tablet by mouth as needed      fexofenadine-pseudoephedrine (ALLEGRA-D)  MG per tablet Take 1 tablet by mouth      fluticasone (FLONASE) 50 mcg/act nasal spray 2 sprays into each nostril daily (Patient not taking: Reported on 11/16/2021 ) 1 Bottle 0    fluticasone-salmeterol (Advair Diskus) 250-50 mcg/dose inhaler Inhale 1 puff 2 (two) times a day 180 blister 0    montelukast (SINGULAIR) 10 mg tablet Take 1 tablet (10 mg total) by mouth daily 90 tablet 3     No current facility-administered medications for this visit          Additional Meds/Supplements: n/a   Barriers to Learning: Readiness to Change   Labs: None    Height: Ht Readings from Last 3 Encounters:   07/20/22 5' 4" (1 626 m)   11/22/21 5' 4" (1 626 m)   11/16/21 5' 4" (1 626 m)      Weight: Wt Readings from Last 3 Encounters:   07/20/22 82 6 kg (182 lb 1 6 oz)   11/22/21 78 7 kg (173 lb 8 oz)   11/16/21 78 7 kg (173 lb 8 oz)     Body mass index is 31 26 kg/m²  Wt  Change Since Last Visit: [x]Yes     []No  Amount: Aware of why  Has a plan to make changes  Energy Needs: 5816-9695   Pain Screen: Are you having pain now? No      Goals Achieved:      New Goals: 1  Lauren Delong will make 1/2-3/4 of her plate non-starchy vegetables at least 5x per week- got away from this but did get off track  Going to go through the house and eliminate some of the unhealthier foods  2 Anni will lose 10lbs by 10/2022 by getting back to utilizing [employer] pal 1200-1500kcals/day    3  Lauren Delong will stay consistent with [employer] and work out 30 min 5x/week   [facility] will drink 60 oz of water throughout the day  Will drink 1 in the morning before coffee, drink 1 20 oz between lunch, and in the afternoon will have another 20oz  Initial PES:    Overweight/obesity  related to Physical inactivity, intake of low fiber, calorically dense foods  as evidenced by  BMI more than normative standard for age and sex (overweight 25-29 9), A1c 6 1%, total cholesterol 224,     New PES: No Change           Assessment: It is not so much my eating patters it is keeping up with a routine  I am very stressed  Does meal prep through the winter does hello fresh  low calorie  Wants to do something that is grab and go that is already prepared  Lauren Delong wants to do something with her diet to be more frequent with the bowel movents  Sometimes it takes her 3-4 days to have a bowel movement  Was eating oatmeal in the morning and has switched to yogurt or eggs or an atkins bar and a snack during the day furit or nuts, lunch is salad or chicken and vegetable  Dinner varies  I cook for my  and watch my portions  Beverages: I am not a big drinker and I know I need to drink more  I tried a different cup to help motivate me to drink more but then I am in the the bathroom more   Tries to keep water on her desk so she does see it and tries to drink more  Discussed ways to drink more water  Exercise: did do PT and it was elbow and wrist    Was walking more and then she got away from it  Jeferson Trivedi had a work out francisco at school who retired  Has been going hiking but has been very busy this summer and hasn't done any biking or kayaking yet  May be starting yoga in September with her former coworker  Is joining a tennis group in the fall  V-shred is an at home exercise she did like it but it was continuing with it for more than 2 weeks that she found difficult  Sleep apnea: Jeferson Trivedi went to get a CPAP but instead got invisalign  Knows that if she does this she would be able to drop a couple pounds  Still wont' solve the problem         Medical Nutrition Therapy Intervention:  []Individualized Meal Plan []Understanding Lab Values   []Basic Pathophysiology of Disease []Food/Medication Interactions   [x]Food Diary [x]Exercise   [x]Lifestyle/Behavior Modification Techniques []Medication, Mechanism of Action   []Label Reading []Self Blood Glucose Monitoring   [x]Weight/BMI Goals []Other -    Other Notes:        Comprehension: [x]Excellent  []Very Good  []Good  []Fair   []Poor    Receptivity: []Excellent  [x]Very Good  []Good  []Fair   []Poor    Expected Compliance: []Excellent  []Very Good  [x]Good  []Fair   []Poor      Labs:  CMP  Lab Results   Component Value Date     10/24/2014    K 3 9 11/19/2021     11/19/2021    CO2 28 11/19/2021    ANIONGAP 8 10/24/2014    BUN 13 11/19/2021    CREATININE 0 93 11/19/2021    GLUCOSE 77 10/24/2014    GLUF 92 11/19/2021    CALCIUM 9 6 11/19/2021    AST 14 11/19/2021    ALT 23 11/19/2021    ALKPHOS 87 11/19/2021    PROT 7 5 10/24/2014    BILITOT 0 34 10/24/2014    EGFR 67 11/19/2021       BMP  Lab Results   Component Value Date    GLUCOSE 77 10/24/2014    CALCIUM 9 6 11/19/2021     10/24/2014    K 3 9 11/19/2021    CO2 28 11/19/2021     11/19/2021    BUN 13 11/19/2021    CREATININE 0 93 11/19/2021       Lipids  Lab Results   Component Value Date    CHOL 220 10/24/2014     Lab Results   Component Value Date    HDL 81 11/19/2021    HDL 71 11/18/2020    HDL 80 (H) 11/16/2018     Lab Results   Component Value Date    LDLCALC 133 (H) 11/19/2021    LDLCALC 135 (H) 11/18/2020    LDLCALC 105 (H) 11/16/2018     Lab Results   Component Value Date    TRIG 63 11/19/2021    TRIG 91 11/18/2020    TRIG 52 11/16/2018     No results found for: CHOLHDL    Hemoglobin A1C  Lab Results   Component Value Date    HGBA1C 6 0 (H) 11/19/2021       Fasting Glucose  Lab Results   Component Value Date    GLUF 92 11/19/2021       Insulin     Thyroid  No results found for: TSH, B7TMHGX, L5USOZL, THYROIDAB    Hepatic Function Panel  Lab Results   Component Value Date    ALT 23 11/19/2021    AST 14 11/19/2021    ALKPHOS 87 11/19/2021    BILITOT 0 34 10/24/2014       Celiac Disease Antibody Panel  No results found for: ENDOMYSIAL IGA, GLIADIN IGA, GLIADIN IGG, IGA, TISSUE TRANSGLUT AB, TTG IGA   Iron  No results found for: IRON, TIBC, FERRITIN    Vitamins  No results found for: VITAMIN B2   No results found for: NICOTINAMIDE, NICOTINIC ACID   No results found for: VITAMINB6  No results found for: LVDIEXPM35  No results found for: VITB5  No results found for: K9YNVVAQ  No results found for: THYROGLB  No results found for: VITAMIN K   No results found for: 25-HYDROXY VIT D   No components found for: VITAMINE     No follow-ups on file      Rolando Hall RDN, CNSC, [address] [address]

## 2022-08-18 ENCOUNTER — ANNUAL EXAM (OUTPATIENT)
Dept: OBGYN CLINIC | Facility: CLINIC | Age: 62
End: 2022-08-18
Payer: COMMERCIAL

## 2022-08-18 VITALS
WEIGHT: 184 LBS | BODY MASS INDEX: 31.41 KG/M2 | HEIGHT: 64 IN | DIASTOLIC BLOOD PRESSURE: 80 MMHG | SYSTOLIC BLOOD PRESSURE: 122 MMHG

## 2022-08-18 DIAGNOSIS — Z12.39 ENCOUNTER FOR SCREENING BREAST EXAMINATION: ICD-10-CM

## 2022-08-18 DIAGNOSIS — Z12.31 ENCOUNTER FOR SCREENING MAMMOGRAM FOR BREAST CANCER: ICD-10-CM

## 2022-08-18 DIAGNOSIS — Z01.419 ENCOUNTER FOR WELL WOMAN EXAM: Primary | ICD-10-CM

## 2022-08-18 PROCEDURE — S0612 ANNUAL GYNECOLOGICAL EXAMINA: HCPCS | Performed by: PHYSICIAN ASSISTANT

## 2022-09-08 ENCOUNTER — OFFICE VISIT (OUTPATIENT)
Dept: SLEEP CENTER | Facility: CLINIC | Age: 62
End: 2022-09-08
Payer: COMMERCIAL

## 2022-09-08 VITALS
WEIGHT: 188 LBS | HEART RATE: 78 BPM | BODY MASS INDEX: 32.1 KG/M2 | HEIGHT: 64 IN | DIASTOLIC BLOOD PRESSURE: 64 MMHG | SYSTOLIC BLOOD PRESSURE: 121 MMHG

## 2022-09-08 DIAGNOSIS — G47.33 OSA (OBSTRUCTIVE SLEEP APNEA): Primary | ICD-10-CM

## 2022-09-08 PROCEDURE — 99214 OFFICE O/P EST MOD 30 MIN: CPT | Performed by: PHYSICIAN ASSISTANT

## 2022-09-08 NOTE — ASSESSMENT & PLAN NOTE
Patient has a history of mild obstructive sleep apnea with an LYNDSEY of 7 5 noted on home sleep study 11/20/2020  She was ordered auto CPAP with setting of 5-15 cm of H2O  Patient had a recalled CPAP machine and did receive a new one through [facility]  However, she immediately returned the machine as she felt CPAP was not a good fit for due to comfort issues and difficulty sleeping using the CPAP  Without the CPAP, the patient states she has increased daytime sleepiness and difficulty sleeping as long as she would like to  She states even though she felt better with the CPAP she is not willing to try it again  She is currently seeking and mandible advancing device from her dentist's office  She is requesting a new sleep study as her dentist is requiring this prior to making her device  We will follow back with her in 6 months to see how she is feeling using her oral appliance, and if her insurance will cover it, I would order a new sleep study to re-evaluate her sleep apnea using the oral appliance  We also did discuss [facility]  However, currently she is not a candidate due to the fact her LYNDSEY is only 7 5  I also reviewed her previous home sleep study with her in detail  She was also advised to avoid sleeping on her back as her sleep apnea was worse in this position

## 2022-09-08 NOTE — PROGRESS NOTES
Progress Note - [address] [facility] 63 y o  female   ONEAL:[birth year], MRN: [id number]  9/8/2022          Follow Up Evaluation / Problem:     Obstructive Sleep Apnea      Thank you for the opportunity of participating in the evaluation and care of this patient in the Sleep Clinic at [facility]  HPI: Cyndee Crowder is a 63y o  year old obese female with a past medical history of moderate persistent asthma who presents today for re-evaluation of her mild obstructive sleep apnea  She had a home sleep study 11/20/2020 which showed an LYNDSEY of 7 5  Patient's weight at the time the study was 173 lb  She was then ordered auto CPAP with a setting of 5-15 cm of H2O  Patient states she used it for several months and did feel better using the machine  She states she had less daytime sleepiness, slept longer, and woke up less  Her CPAP machine was then recalled, and she did receive a new machine through [facility]  Patient states she has not been using her CPAP and actually sent it back to [facility] due to comfort issues  She states she was having difficulty sleeping with it  At this point, she is not willing to try CPAP any further due to comfort issues  She is currently seeking mandible advancing device her dentist office  Her dentist is requiring a new sleep study prior to making the device  Patient is here in hopes of getting a new sleep study and using the device  Review of Systems      Genitourinary none   Cardiology none   Gastrointestinal none   Neurology none   Constitutional fatigue and weight change   Integumentary rash or dry skin and itching   Psychiatry none   Musculoskeletal none   Pulmonary snoring   ENT none   Endocrine frequent urination   Hematological none     Patient has had a 15 lb weight gain over the past 2 years        Current Outpatient Medications:     albuterol (Ventolin HFA) 90 mcg/act inhaler, 1-2 puffs by mouth every 4 hours as needed for wheezing, Disp: 3 each, Rfl: 3    fexofenadine (ALLEGRA) 180 MG tablet, Take 1 tablet by mouth as needed, Disp: , Rfl:     fexofenadine-pseudoephedrine (ALLEGRA-D)  MG per tablet, Take 1 tablet by mouth, Disp: , Rfl:     fluticasone-salmeterol (Advair Diskus) 250-50 mcg/dose inhaler, Inhale 1 puff 2 (two) times a day, Disp: 180 blister, Rfl: 0    montelukast (SINGULAIR) 10 mg tablet, Take 1 tablet (10 mg total) by mouth daily, Disp: 90 tablet, Rfl: 3    fluticasone (FLONASE) 50 mcg/act nasal spray, 2 sprays into each nostril daily (Patient not taking: Reported on 11/16/2021 ), Disp: 1 Bottle, Rfl: 0    Marion Sleepiness Scale  Sitting and reading: High chance of dozing  Watching TV: High chance of dozing  Sitting, inactive in a public place (e g  a theatre or a meeting): Moderate chance of dozing  As a passenger in a car for an hour without a break: Moderate chance of dozing  Lying down to rest in the afternoon when circumstances permit: Slight chance of dozing  Sitting and talking to someone: Slight chance of dozing  Sitting quietly after a lunch without alcohol: Slight chance of dozing  In a car, while stopped for a few minutes in traffic: Moderate chance of dozing  Total score: 15              Vitals:    09/08/22 1431   BP: 121/64   BP Location: Left arm   Patient Position: Sitting   Cuff Size: Large   Pulse: 78   Weight: 85 3 kg (188 lb)   Height: 5' 4" (1 626 m)       Body mass index is 32 27 kg/m²      Neck Circumference: 14       EPWORTH SLEEPINESS SCORE  Total score: 15        The review of systems and following portions of the patient's history were reviewed and updated as appropriate: allergies, current medications, past family history, past medical history, past social history, past surgical history, and problem list         OBJECTIVE          Physical Exam:     General Appearance:   Alert, cooperative, no distress, appears stated age, mildly obese     Head:   Normocephalic, without obvious abnormality, atraumatic     Eyes:   PERRL, conjunctiva/corneas clear, EOM's intact          Nose:  Nares normal, septum midline, no drainage or sinus tenderness     Neck:      Throat:      Supple, symmetrical, trachea midline, no adenopathy; Thyroid: No enlargement, tenderness or nodules; no carotid bruit or JVD      Lips, teeth and gums normal; tongue normal size and  shape and midline in position; mucosa moist, low-lying soft palate, uvula long, tonsils not visualized, Mallampati class 3    Lungs:   Clear to auscultation bilaterally, respirations unlabored     Heart:   Regular rate and rhythm, S1 and S2 normal, no murmur, rub or gallop       Extremities:  Extremities normal, atraumatic, no cyanosis or edema       Skin:  Skin color, texture, turgor normal, no rashes or lesions       Neurologic:  No focal deficits noted       ASSESSMENT / PLAN    1  SHANE (obstructive sleep apnea)  Assessment & Plan:  Patient has a history of mild obstructive sleep apnea with an LYNDSEY of 7 5 noted on home sleep study 11/20/2020  She was ordered auto CPAP with setting of 5-15 cm of H2O  Patient had a recalled CPAP machine and did receive a new one through [facility]  However, she immediately returned the machine as she felt CPAP was not a good fit for due to comfort issues and difficulty sleeping using the CPAP  Without the CPAP, the patient states she has increased daytime sleepiness and difficulty sleeping as long as she would like to  She states even though she felt better with the CPAP she is not willing to try it again  She is currently seeking and mandible advancing device from her dentist's office  She is requesting a new sleep study as her dentist is requiring this prior to making her device    We will follow back with her in 6 months to see how she is feeling using her oral appliance, and if her insurance will cover it, I would order a new sleep study to re-evaluate her sleep apnea using the oral appliance  We also did discuss [facility]  However, currently she is not a candidate due to the fact her LYNDSEY is only 7 5  I also reviewed her previous home sleep study with her in detail  She was also advised to avoid sleeping on her back as her sleep apnea was worse in this position  Orders:  -     Home Study; Future; Expected date: 09/08/2022         Counseling / Coordination of Care  Total clinic time spent today 45 minutes in chart review, face-to-face time spent with the patient, coordination of care, and documentation  A description of the counseling / coordination of care:     Impressions, Diagnostic results, Prognosis, Instructions for management, Risks and benefits of treatment, Patient and family education, Risk factor reductions and Importance of compliance with treatment    The following instructions have been given to the patient today:    Patient Instructions   WE [address] TO REASSESS Miriam 26  GET YOUR MANDIBLE ADVANCING DEVICE TO YOUR DENTIST AS SOON AS POSSIBLE  WE CAN FOLLOW BACK WITH YOU AFTER YOU GET YOUR DEVICE TO SEE HOW YOUR SYMPTOMS ARE AND POSSIBLY ORDER A NEW SLEEP STUDY WITH THE DEVICE TO SEE HOW EFFECTIVE IT IS  Try to avoid sleeping on your back as this worsened your sleep apnea  You may try a bumper pillow if necessary to keep you from a lying onto her back  Nursing Support:  When: Monday through Friday 7A-5PM except holidays  Where: Our direct line is [phone]  If you are having a true emergency please call 911  In the event that the line is busy or it is after hours please leave a voice message and we will return your call  Please speak clearly, leaving your full name, birth date, best number to reach you and the reason for your call  Medication refills:  We will need the name of the medication, the dosage, the ordering provider, whether you get a 30 or 90 day refill, and the pharmacy name and address  Medications will be ordered by the provider only  Nurses cannot call in prescriptions  Please allow 7 days for medication refills  Physician requested updates: If your provider requested that you call with an update after starting medication, please be ready to provide us the medication and dosage, what time you take your medication, the time you attempt to fall asleep, time you fall asleep, when you wake up, and what time you get out of bed  Sleep Study Results: We will contact you with sleep study results and/or next steps after the physician has reviewed your testing             EMILY Philippe 15

## 2022-09-08 NOTE — PATIENT INSTRUCTIONS
WE WILL ORDER YOU A NEW HOME SLEEP STUDY TO REASSESS YOUR SLEEP APNEA  GET YOUR MANDIBLE ADVANCING DEVICE TO YOUR DENTIST AS SOON AS POSSIBLE  WE CAN FOLLOW BACK WITH YOU AFTER YOU GET YOUR DEVICE TO SEE HOW YOUR SYMPTOMS ARE AND POSSIBLY ORDER A NEW SLEEP STUDY WITH THE DEVICE TO SEE HOW EFFECTIVE IT IS  Try to avoid sleeping on your back as this worsened your sleep apnea  You may try a bumper pillow if necessary to keep you from a lying onto her back  Nursing Support:  When: Monday through Friday 7A-5PM except holidays  Where: Our direct line is [phone]  If you are having a true emergency please call 911  In the event that the line is busy or it is after hours please leave a voice message and we will return your call  Please speak clearly, leaving your full name, birth date, best number to reach you and the reason for your call  Medication refills: We will need the name of the medication, the dosage, the ordering provider, whether you get a 30 or 90 day refill, and the pharmacy name and address  Medications will be ordered by the provider only  Nurses cannot call in prescriptions  Please allow 7 days for medication refills  Physician requested updates: If your provider requested that you call with an update after starting medication, please be ready to provide us the medication and dosage, what time you take your medication, the time you attempt to fall asleep, time you fall asleep, when you wake up, and what time you get out of bed  Sleep Study Results: We will contact you with sleep study results and/or next steps after the physician has reviewed your testing

## 2022-09-29 ENCOUNTER — HOSPITAL ENCOUNTER (OUTPATIENT)
Dept: SLEEP CENTER | Facility: CLINIC | Age: 62
Discharge: HOME | End: 2022-09-29
Payer: COMMERCIAL

## 2022-09-29 DIAGNOSIS — G47.33 OSA (OBSTRUCTIVE SLEEP APNEA): ICD-10-CM

## 2022-09-29 PROCEDURE — G0399 HOME SLEEP TEST/TYPE 3 PORTA: HCPCS

## 2022-10-07 NOTE — PROGRESS NOTES
Home Sleep Study Documentation    HOME STUDY DEVICE: Noxturnal no                                           Cathy G3 yes      Pre-Sleep Home Study:    Set-up and instructions performed by: Betzaida Blanco performed demonstration for Patient: yes    Return demonstration performed by Patient: yes    Written instructions provided to Patient: yes    Patient signed consent form: yes        Post-Sleep Home Study:    Additional comments by Patient:     Home Sleep Study Failed:no:    Failure reason: N/A    Reported or Detected: N/A    Scored by: KARLOS Brown, DORENEGT

## 2022-10-10 PROCEDURE — 95806 SLEEP STUDY UNATT&RESP EFFT: CPT | Performed by: PSYCHIATRY & NEUROLOGY

## 2022-10-12 ENCOUNTER — TELEPHONE (OUTPATIENT)
Dept: SLEEP CENTER | Facility: CLINIC | Age: 62
End: 2022-10-12

## 2022-10-12 NOTE — TELEPHONE ENCOUNTER
Called patient and advised of sleep study results and Kaleigh's recommendations  Patient states she is unsure if she wants to proceed with the in lab study  Her dentist was planning to order an at home test over several nights  She will check with him first and will call back if she decides to have Ena Oswald order the in lab study

## 2022-10-12 NOTE — TELEPHONE ENCOUNTER
----- Message from Lenard Hurtado PA-C sent at 10/11/2022  8:29 AM EDT -----  Please advise the patient her home sleep study did not show sleep apnea  Her LYNDSEY was noted to be 3 9 which is less than 5 which does not support obstructive sleep apnea  However, it is possible this could be a false negative due to the fact she did not sleep well, and also she did not sleep on her back during the test   I know the patient is currently seeking a mandible advancing device through her dentist and not CPAP  If she would like, we can order an in-lab sleep study to further evaluate her sleep apnea  Please ask the patient how she would like to proceed

## 2022-10-24 ENCOUNTER — APPOINTMENT (OUTPATIENT)
Dept: RADIOLOGY | Age: 62
End: 2022-10-24
Payer: COMMERCIAL

## 2022-10-24 ENCOUNTER — OFFICE VISIT (OUTPATIENT)
Dept: FAMILY MEDICINE CLINIC | Facility: CLINIC | Age: 62
End: 2022-10-24
Payer: COMMERCIAL

## 2022-10-24 VITALS
HEIGHT: 64 IN | OXYGEN SATURATION: 95 % | DIASTOLIC BLOOD PRESSURE: 64 MMHG | TEMPERATURE: 97.4 F | HEART RATE: 84 BPM | BODY MASS INDEX: 31.76 KG/M2 | SYSTOLIC BLOOD PRESSURE: 102 MMHG | WEIGHT: 186 LBS | RESPIRATION RATE: 16 BRPM

## 2022-10-24 DIAGNOSIS — R05.8 RECURRENT COUGH: Primary | ICD-10-CM

## 2022-10-24 DIAGNOSIS — J45.40 MODERATE PERSISTENT ASTHMA WITHOUT COMPLICATION: ICD-10-CM

## 2022-10-24 DIAGNOSIS — R05.8 RECURRENT COUGH: ICD-10-CM

## 2022-10-24 DIAGNOSIS — Z23 ENCOUNTER FOR IMMUNIZATION: ICD-10-CM

## 2022-10-24 PROCEDURE — 90471 IMMUNIZATION ADMIN: CPT

## 2022-10-24 PROCEDURE — 71046 X-RAY EXAM CHEST 2 VIEWS: CPT

## 2022-10-24 PROCEDURE — 90682 RIV4 VACC RECOMBINANT DNA IM: CPT

## 2022-10-24 PROCEDURE — 99213 OFFICE O/P EST LOW 20 MIN: CPT | Performed by: FAMILY MEDICINE

## 2022-10-24 RX ORDER — PREDNISONE 20 MG/1
40 TABLET ORAL DAILY
Qty: 10 TABLET | Refills: 0 | Status: SHIPPED | OUTPATIENT
Start: 2022-10-24 | End: 2022-10-29

## 2022-10-24 RX ORDER — BENZONATATE 200 MG/1
200 CAPSULE ORAL 3 TIMES DAILY PRN
Qty: 20 CAPSULE | Refills: 0 | Status: SHIPPED | OUTPATIENT
Start: 2022-10-24

## 2022-10-24 NOTE — PROGRESS NOTES
Name: Janes Mon      : 1960      MRN: [id number]  Encounter Provider: Bobby Pichardo MD  Encounter Date: 10/24/2022   Encounter department: [address] [place] [address]     1  Recurrent cough  -     XR chest pa & lateral; Future; Expected date: 10/24/2022    2  Moderate persistent asthma without complication  -     predniSONE 20 mg tablet; Take 2 tablets (40 mg total) by mouth daily for 5 days  -     benzonatate (TESSALON) 200 MG capsule; Take 1 capsule (200 mg total) by mouth 3 (three) times a day as needed for cough    3  Encounter for immunization  -     influenza vaccine, quadrivalent, recombinant, PF, 0 5 mL, for patients 18 yr+ (FLUBLOK)    Chest x-ray  Prednisone 20 mg b i d  x5 days  P r n  Tessalon for cough continue with Advair and Singulair  Follow up once results are back  Advised to call if any changes       Subjective      Patient presents with a 4 week history of persistent nonproductive cough  She was seen last month at urgent care she completed a Z-Tyrel and prednisone  Asthma on Advair 250/50 twice a day along with Singulair and p r n  Allegra  Nasal congestion or postnasal drainage  No reflux  No fevers chills night sweats or weight loss  Cough worse at nighttime she has been using as needed Delsym     Review of Systems   Constitutional: Negative for appetite change, chills, fatigue and fever  HENT: Negative for congestion, ear pain, postnasal drip, rhinorrhea, sinus pain and sore throat  Respiratory: Positive for cough  Negative for shortness of breath and wheezing  Cardiovascular: Negative for chest pain and palpitations  Gastrointestinal: Negative for diarrhea, nausea and vomiting  Musculoskeletal: Negative for myalgias  Neurological: Negative for headaches  Hematological: Negative for adenopathy         Current Outpatient Medications on File Prior to Visit   Medication Sig   • albuterol (Ventolin HFA) 90 mcg/act inhaler 1-2 puffs by mouth every 4 hours as needed for wheezing   • fexofenadine (ALLEGRA) 180 MG tablet Take 1 tablet by mouth as needed   • fexofenadine-pseudoephedrine (ALLEGRA-D)  MG per tablet Take 1 tablet by mouth   • fluticasone-salmeterol (Advair Diskus) 250-50 mcg/dose inhaler Inhale 1 puff 2 (two) times a day   • montelukast (SINGULAIR) 10 mg tablet Take 1 tablet (10 mg total) by mouth daily   • [DISCONTINUED] fluticasone (FLONASE) 50 mcg/act nasal spray 2 sprays into each nostril daily (Patient not taking: Reported on 11/16/2021 )     Allergies   Allergen Reactions   • Other      seasonal       Objective     /64   Pulse 84   Temp (!) 97 4 °F (36 3 °C)   Resp 16   Ht 5' 4" (1 626 m)   Wt 84 4 kg (186 lb)   LMP  (LMP Unknown)   SpO2 95%   BMI 31 93 kg/m²     Wt Readings from Last 3 Encounters:   10/24/22 84 4 kg (186 lb)   09/08/22 85 3 kg (188 lb)   08/18/22 83 5 kg (184 lb)       Physical Exam  Vitals and nursing note reviewed  Constitutional:       General: She is not in acute distress  Appearance: She is well-developed  HENT:      Right Ear: Tympanic membrane and ear canal normal       Left Ear: Tympanic membrane and ear canal normal       Nose:      Right Sinus: No maxillary sinus tenderness or frontal sinus tenderness  Left Sinus: No maxillary sinus tenderness or frontal sinus tenderness  Mouth/Throat:      Mouth: No oral lesions  Eyes:      Conjunctiva/sclera: Conjunctivae normal       Pupils: Pupils are equal, round, and reactive to light  Cardiovascular:      Rate and Rhythm: Normal rate and regular rhythm  Heart sounds: Normal heart sounds  No murmur heard  No gallop  Pulmonary:      Effort: Pulmonary effort is normal  No respiratory distress  Breath sounds: Normal breath sounds  No wheezing or rales  Lymphadenopathy:      Cervical: No cervical adenopathy  Skin:     Findings: No rash     Neurological:      Mental Status: She is alert and oriented to person, place, and time         Janie Forrest MD

## 2022-11-25 ENCOUNTER — HOSPITAL ENCOUNTER (OUTPATIENT)
Dept: RADIOLOGY | Age: 62
Discharge: HOME | End: 2022-11-25

## 2022-11-25 VITALS — BODY MASS INDEX: 30.73 KG/M2 | WEIGHT: 180 LBS | HEIGHT: 64 IN

## 2022-11-25 DIAGNOSIS — Z12.31 ENCOUNTER FOR SCREENING MAMMOGRAM FOR MALIGNANT NEOPLASM OF BREAST: ICD-10-CM

## 2022-11-25 DIAGNOSIS — Z12.31 ENCOUNTER FOR SCREENING MAMMOGRAM FOR BREAST CANCER: ICD-10-CM

## 2022-12-19 ENCOUNTER — OFFICE VISIT (OUTPATIENT)
Dept: FAMILY MEDICINE CLINIC | Facility: CLINIC | Age: 62
End: 2022-12-19

## 2022-12-19 VITALS
DIASTOLIC BLOOD PRESSURE: 84 MMHG | HEIGHT: 64 IN | BODY MASS INDEX: 31.33 KG/M2 | SYSTOLIC BLOOD PRESSURE: 122 MMHG | HEART RATE: 80 BPM | TEMPERATURE: 97.5 F | RESPIRATION RATE: 17 BRPM | WEIGHT: 183.5 LBS | OXYGEN SATURATION: 96 %

## 2022-12-19 DIAGNOSIS — R73.03 PREDIABETES: ICD-10-CM

## 2022-12-19 DIAGNOSIS — G47.33 OSA (OBSTRUCTIVE SLEEP APNEA): ICD-10-CM

## 2022-12-19 DIAGNOSIS — J45.40 MODERATE PERSISTENT ASTHMA WITHOUT COMPLICATION: ICD-10-CM

## 2022-12-19 DIAGNOSIS — E78.00 HYPERCHOLESTEROLEMIA: ICD-10-CM

## 2022-12-19 DIAGNOSIS — Z00.00 WELL ADULT EXAM: Primary | ICD-10-CM

## 2022-12-19 DIAGNOSIS — Z83.49 FHX: THYROID DISEASE: ICD-10-CM

## 2022-12-19 PROBLEM — S52.132A: Status: RESOLVED | Noted: 2021-09-29 | Resolved: 2022-12-19

## 2022-12-19 NOTE — PROGRESS NOTES
Name: Raquel Elliott      : 1960      MRN: [id number]  Encounter Provider: Lorna Andino MD  Encounter Date: 2022   Encounter department: [address] [place] [address]     1  Well adult exam    2  Hypercholesterolemia    3  Prediabetes  -     Hemoglobin A1C    4  SHANE (obstructive sleep apnea)    5  Moderate persistent asthma without complication    6  FHx: thyroid disease  -     TSH, 3rd generation with Free T4 reflex  -     Anti-microsomal antibody; Future    7  BMI 31 0-31 9,adult          The 10-year ASCVD risk score (Harvinder DEMARCO, et al , 2019) is: 2 6%    Values used to calculate the score:      Age: 63 years      Sex: Female      Is Non- : No      Diabetic: No      Tobacco smoker: No      Systolic Blood Pressure: 796 mmHg      Is BP treated: No      HDL Cholesterol: 81 mg/dL      Total Cholesterol: 227 mg/dL    Continue with current medications  Trial of daily Pepcid for GERD/cough  Labs  Up to date with flu vaccine and COV 19 boosters  Consider referral to Medical Weight Loss program          Subjective     63year old female here for Wellness exam  Medications reviewed  Hospitalizations/surgeries/SH/FH reviewed see note  Hyperlipidemia with high HDL 2021 lipid profile cholesterol 227  HDL 81  Triglycerides 63    FBS 92  2019 TSH 3 080  Mammogram 2022  [facility] screening  Carotid artery screen normal  4 limb EKG NSR  AAA screen normal  PAD screen normal  Lipid profile cholesterol 197  TGs 72  HDL 74      TSH 1 92       Lab Results   Component Value Date    CHOLESTEROL 227 (H) 2021    CHOLESTEROL 224 (H) 2020    CHOLESTEROL 195 2018     Lab Results   Component Value Date    HDL 81 2021    HDL 71 2020    HDL 80 (H) 2018     Lab Results   Component Value Date    TRIG 63 2021    TRIG 91 2020    TRIG 52 2018     Lab Results   Component Value Date    LDLCALC 133 (H) 11/19/2021     Lab Results   Component Value Date    WBC 5 83 11/19/2021    HGB 14 1 11/19/2021    HCT 44 2 11/19/2021    MCV 88 11/19/2021     11/19/2021     Lab Results   Component Value Date     10/24/2014    SODIUM 142 11/19/2021    K 3 9 11/19/2021     11/19/2021    CO2 28 11/19/2021    ANIONGAP 8 10/24/2014    AGAP 6 11/19/2021    BUN 13 11/19/2021    CREATININE 0 93 11/19/2021    GLUC 99 02/12/2016    GLUF 92 11/19/2021    CALCIUM 9 6 11/19/2021    AST 14 11/19/2021    ALT 23 11/19/2021    ALKPHOS 87 11/19/2021    PROT 7 5 10/24/2014    TP 7 4 11/19/2021    BILITOT 0 34 10/24/2014    TBILI 0 71 11/19/2021    EGFR 67 11/19/2021     Lab Results   Component Value Date    ROK1JWGGCMTU 3 080 03/12/2019             Review of Systems   Constitutional: Positive for unexpected weight change (struggling to lose weight despite regular exercise  )  Negative for appetite change, chills, fatigue and fever  HENT: Negative for congestion, ear pain, rhinorrhea, sore throat and trouble swallowing  Eyes: Negative for visual disturbance  Respiratory: Positive for apnea and cough  Negative for shortness of breath and wheezing  SHANE wears oral appliance-could not tolerate CPAP  Intermittent cough-reflux? Asthma on daily Advair 250/50 and Singulair/Allegra    Cardiovascular: Negative for chest pain, palpitations and leg swelling  Gastrointestinal: Negative for abdominal pain, blood in stool, constipation, diarrhea, nausea and vomiting  Colonoscopy 11/2018   Endocrine:        08/2022 heel study T score 0 951  Vitamin D 22  + FH thyroid disease sister    Genitourinary: Negative for difficulty urinating  08/2021 GYN exam and Pap smear   Musculoskeletal: Negative for arthralgias and myalgias  Skin: Negative for rash  Neurological: Negative for dizziness and headaches  Hematological: Negative for adenopathy  Does not bruise/bleed easily     Psychiatric/Behavioral: Negative for dysphoric mood and sleep disturbance  Past Medical History:   Diagnosis Date   • Asthma    • Closed fracture of neck of left radius 2021   • Encounter for surveillance of contraceptives, unspecified 2013   • H/O mammogram    • Papanicolaou smear 2018    NEG   • Plantar fasciitis 2017    Resolved:  2017   • Post-menopausal      Past Surgical History:   Procedure Laterality Date   • CARPAL TUNNEL RELEASE Bilateral 2016   • COLONOSCOPY      normal   • MAMMO (HISTORICAL) Bilateral 2018    No evidence of malignancy   • [facility] COLONOSCOPY FLX DX W/COLLJ SPEC WHEN PFRMD N/A 2018    Procedure: COLONOSCOPY;  Surgeon: John Jacques MD;  Location: [facility];   Service: Gastroenterology   • VAGINAL DELIVERY      x3     Family History   Problem Relation Age of Onset   • Osteoporosis Mother    • Dementia Mother    • Arthritis Mother         Knees   • Hearing loss Mother    • Asthma Father            • No Known Problems Sister    • Diabetes Daughter    • Polycystic ovary syndrome Daughter    • No Known Problems Daughter    • Ulcerative colitis Son    • No Known Problems Son    • Breast cancer Maternal Grandmother    • Lung cancer Maternal Grandfather 61   • No Known Problems Paternal Grandfather    • No Known Problems Maternal Aunt    • No Known Problems Maternal Aunt    • No Known Problems Maternal Aunt    • No Known Problems Maternal Aunt    • No Known Problems Maternal Aunt    • No Known Problems Paternal Aunt    • No Known Problems Paternal Aunt    • No Known Problems Paternal Aunt    • No Known Problems Paternal Aunt    • No Known Problems Paternal Aunt    • No Known Problems Paternal Aunt    • COPD Cousin      Social History     Socioeconomic History   • Marital status: /Civil Union     Spouse name: None   • Number of children: 3   • Years of education: None   • Highest education level: None   Occupational History   • Occupation: [employer]   Tobacco Use • Smoking status: Never   • Smokeless tobacco: Never   Vaping Use   • Vaping Use: Never used   Substance and Sexual Activity   • Alcohol use:  Yes     Alcohol/week: 1 0 standard drink     Types: 1 Glasses of wine per week   • Drug use: Never   • Sexual activity: Yes     Partners: Male     Birth control/protection: Post-menopausal   Other Topics Concern   • None   Social History Narrative   • None     Social Determinants of Health     Financial Resource Strain: Not on file   Food Insecurity: Not on file   Transportation Needs: Not on file   Physical Activity: Not on file   Stress: Not on file   Social Connections: Not on file   Intimate Partner Violence: Not on file   Housing Stability: Not on file     Current Outpatient Medications on File Prior to Visit   Medication Sig   • albuterol (Ventolin HFA) 90 mcg/act inhaler 1-2 puffs by mouth every 4 hours as needed for wheezing   • fexofenadine (ALLEGRA) 180 MG tablet Take 1 tablet by mouth as needed   • fexofenadine-pseudoephedrine (ALLEGRA-D)  MG per tablet Take 1 tablet by mouth   • fluticasone-salmeterol (Advair Diskus) 250-50 mcg/dose inhaler Inhale 1 puff 2 (two) times a day   • montelukast (SINGULAIR) 10 mg tablet Take 1 tablet (10 mg total) by mouth daily   • [DISCONTINUED] benzonatate (TESSALON) 200 MG capsule Take 1 capsule (200 mg total) by mouth 3 (three) times a day as needed for cough     Allergies   Allergen Reactions   • Other      seasonal     Immunization History   Administered Date(s) Administered   • COVID-19 MODERNA VACC 0 5 ML IM 03/10/2021, 04/07/2021, 11/03/2021   • INFLUENZA 11/06/2015, 11/04/2018   • Influenza Injectable, MDCK, Preservative Free, Quadrivalent, 0 5 mL 09/23/2020   • Influenza, recombinant, quadrivalent,injectable, preservative free 10/10/2019, 11/16/2021, 10/24/2022   • Influenza, seasonal, injectable 10/26/2011, 11/06/2015, 10/14/2016, 11/07/2017   • Pneumococcal Polysaccharide PPV23 11/16/2021   • Tdap 10/11/2007, 10/11/2007, 08/29/2014   • Tuberculin Skin Test-PPD Intradermal 10/17/2007, 08/10/2012   • Zoster Vaccine Recombinant 12/04/2020, 02/08/2021       Objective     /84 (BP Location: Left arm, Patient Position: Sitting, Cuff Size: Standard)   Pulse 80   Temp 97 5 °F (36 4 °C) (Temporal)   Resp 17   Ht 5' 4" (1 626 m)   Wt 83 2 kg (183 lb 8 oz)   LMP  (LMP Unknown)   SpO2 96%   BMI 31 50 kg/m²     BP Readings from Last 3 Encounters:   12/19/22 122/84   10/24/22 102/64   09/08/22 121/64     Wt Readings from Last 3 Encounters:   12/19/22 83 2 kg (183 lb 8 oz)   11/25/22 81 6 kg (180 lb)   10/24/22 84 4 kg (186 lb)     Physical Exam  Vitals and nursing note reviewed  Constitutional:       General: She is not in acute distress  Appearance: She is well-developed  HENT:      Right Ear: Tympanic membrane and ear canal normal       Left Ear: Tympanic membrane and ear canal normal    Eyes:      General: No scleral icterus  Extraocular Movements: Extraocular movements intact  Conjunctiva/sclera: Conjunctivae normal       Pupils: Pupils are equal, round, and reactive to light  Neck:      Thyroid: No thyroid mass or thyromegaly  Vascular: No carotid bruit or JVD  Trachea: No tracheal deviation  Cardiovascular:      Rate and Rhythm: Normal rate and regular rhythm  Heart sounds: Normal heart sounds  No murmur heard  No gallop  Pulmonary:      Effort: Pulmonary effort is normal  No respiratory distress  Breath sounds: Normal breath sounds  No wheezing or rales  Musculoskeletal:      Right lower leg: No edema  Left lower leg: No edema  Lymphadenopathy:      Cervical: No cervical adenopathy  Upper Body:      Right upper body: No supraclavicular adenopathy  Left upper body: No supraclavicular adenopathy  Skin:     Findings: No rash  Nails: There is no clubbing  Neurological:      General: No focal deficit present        Mental Status: She is alert and oriented to person, place, and time     Psychiatric:         Mood and Affect: Mood normal          Behavior: Behavior normal        Judy Tyson MD

## 2022-12-23 ENCOUNTER — APPOINTMENT (OUTPATIENT)
Dept: LAB | Facility: CLINIC | Age: 62
End: 2022-12-23

## 2022-12-23 DIAGNOSIS — Z83.49 FHX: THYROID DISEASE: ICD-10-CM

## 2022-12-23 LAB
EST. AVERAGE GLUCOSE BLD GHB EST-MCNC: 131 MG/DL
HBA1C MFR BLD: 6.2 %
TSH SERPL DL<=0.05 MIU/L-ACNC: 2.24 UIU/ML (ref 0.45–4.5)

## 2022-12-24 LAB — THYROPEROXIDASE AB SERPL-ACNC: 29 IU/ML (ref 0–34)

## 2023-01-04 ENCOUNTER — TELEPHONE (OUTPATIENT)
Dept: OTHER | Facility: OTHER | Age: 63
End: 2023-01-04

## 2023-01-04 ENCOUNTER — TELEPHONE (OUTPATIENT)
Dept: FAMILY MEDICINE CLINIC | Facility: CLINIC | Age: 63
End: 2023-01-04

## 2023-01-04 DIAGNOSIS — H10.13 ALLERGIC CONJUNCTIVITIS OF BOTH EYES: Primary | ICD-10-CM

## 2023-01-04 RX ORDER — OLOPATADINE HYDROCHLORIDE 1 MG/ML
1 SOLUTION/ DROPS OPHTHALMIC 2 TIMES DAILY
Qty: 5 ML | Refills: 3 | Status: SHIPPED | OUTPATIENT
Start: 2023-01-04

## 2023-01-04 NOTE — TELEPHONE ENCOUNTER
Patient calling wondering if Dr Tamra Harris would order her eye Gtts -Olopatadine HCL 0 1% that he has given her in the past  Her allergies are acting up this morning and she is holding off going into work wanting to see if she could  these drops first   She just had a Well visit 12/19/2022 with him and her eyes were fine then so did not ask for the Gtts  to be reordered  Please call her back to let her know if she can have them ordered?

## 2023-01-27 ENCOUNTER — OFFICE VISIT (OUTPATIENT)
Dept: FAMILY MEDICINE CLINIC | Facility: CLINIC | Age: 63
End: 2023-01-27

## 2023-01-27 VITALS
WEIGHT: 184.5 LBS | HEIGHT: 64 IN | DIASTOLIC BLOOD PRESSURE: 90 MMHG | SYSTOLIC BLOOD PRESSURE: 110 MMHG | HEART RATE: 82 BPM | BODY MASS INDEX: 31.5 KG/M2 | TEMPERATURE: 98.3 F | OXYGEN SATURATION: 93 %

## 2023-01-27 DIAGNOSIS — R05.3 CHRONIC COUGH: ICD-10-CM

## 2023-01-27 DIAGNOSIS — G47.33 OSA (OBSTRUCTIVE SLEEP APNEA): ICD-10-CM

## 2023-01-27 DIAGNOSIS — J45.40 MODERATE PERSISTENT ASTHMA WITHOUT COMPLICATION: Primary | ICD-10-CM

## 2023-01-27 RX ORDER — OMEPRAZOLE 20 MG/1
20 CAPSULE, DELAYED RELEASE ORAL
Qty: 90 CAPSULE | Refills: 3 | Status: SHIPPED | OUTPATIENT
Start: 2023-01-27

## 2023-01-27 RX ORDER — PREDNISONE 20 MG/1
40 TABLET ORAL DAILY
Qty: 10 TABLET | Refills: 0 | Status: SHIPPED | OUTPATIENT
Start: 2023-01-27 | End: 2023-02-01

## 2023-01-27 RX ORDER — FLUTICASONE PROPIONATE AND SALMETEROL 250; 50 UG/1; UG/1
1 POWDER RESPIRATORY (INHALATION) 2 TIMES DAILY
Qty: 180 BLISTER | Refills: 0 | Status: SHIPPED | OUTPATIENT
Start: 2023-01-27 | End: 2024-01-22

## 2023-01-27 NOTE — ASSESSMENT & PLAN NOTE
Continue following with sleep medicine  Wearing an oral recent sleep study completed    Continue with oral appliance for sleep apnea

## 2023-01-27 NOTE — PROGRESS NOTES
Name: Eliot Nunez      : 1960      MRN: [id number]  Encounter Provider: Juan Carlos Simpson MD  Encounter Date: 2023   Encounter department: [address] [place] [address]     1  Moderate persistent asthma without complication  Assessment & Plan:  Patient currently using Advair once daily  Will increase to 1 puff twice daily  Continue with Singulair 10 mg daily  Albuterol as needed  Significant bilateral wheezing on physical exam   We will start patient on 40 mg of prednisone for 5 days  We will also order pulmonary function testing  Orders:  -     Fluticasone-Salmeterol (Advair Diskus) 250-50 mcg/dose inhaler; Inhale 1 puff 2 (two) times a day Rinse mouth after use  -     Complete PFT with post bronchodilator; Future  -     predniSONE 20 mg tablet; Take 2 tablets (40 mg total) by mouth daily for 5 days    2  Chronic cough  -     omeprazole (PriLOSEC) 20 mg delayed release capsule; Take 1 capsule (20 mg total) by mouth daily before breakfast    3  SHANE (obstructive sleep apnea)  Assessment & Plan:  Continue following with sleep medicine  Wearing an oral recent sleep study completed  Continue with oral appliance for sleep apnea             Subjective      Patient presents with:  Asthma  Cough  Shortness of Breath    Worsening cough and asthma since having bronchitis in the fall  Currently using Advair once daily along with Singulair and albuterol  She has been using her albuterol up to 4 times daily at this point  Has never had pulmonary function testing  Continues to wear CPAP  Oxygen saturation in office today is 93%  Patient states she purposely did not use her albuterol before this visit  Review of Systems   Respiratory: Positive for cough, chest tightness, shortness of breath and wheezing          Current Outpatient Medications on File Prior to Visit   Medication Sig   • albuterol (Ventolin HFA) 90 mcg/act inhaler 1-2 puffs by mouth every 4 hours as needed for wheezing   • fexofenadine (ALLEGRA) 180 MG tablet Take 1 tablet by mouth as needed   • montelukast (SINGULAIR) 10 mg tablet Take 1 tablet (10 mg total) by mouth daily   • olopatadine (PATANOL) 0 1 % ophthalmic solution Administer 1 drop to both eyes 2 (two) times a day   • [DISCONTINUED] fluticasone-salmeterol (Advair Diskus) 250-50 mcg/dose inhaler Inhale 1 puff 2 (two) times a day   • fexofenadine-pseudoephedrine (ALLEGRA-D)  MG per tablet Take 1 tablet by mouth (Patient not taking: Reported on 1/27/2023)       Objective     /90 (BP Location: Left arm, Patient Position: Sitting, Cuff Size: Large)   Pulse 82   Temp 98 3 °F (36 8 °C)   Ht 5' 4" (1 626 m)   Wt 83 7 kg (184 lb 8 oz)   LMP  (LMP Unknown)   SpO2 93%   BMI 31 67 kg/m²     Physical Exam  Constitutional:       Appearance: She is well-developed  HENT:      Head: Normocephalic and atraumatic  Cardiovascular:      Rate and Rhythm: Normal rate and regular rhythm  Pulmonary:      Effort: Pulmonary effort is normal       Breath sounds: Examination of the right-middle field reveals wheezing  Examination of the left-middle field reveals wheezing  Examination of the right-lower field reveals wheezing  Examination of the left-lower field reveals wheezing  Wheezing present  No rhonchi or rales  Skin:     General: Skin is warm and dry  Capillary Refill: Capillary refill takes less than 2 seconds  Neurological:      Mental Status: She is alert         Cary Santos MD

## 2023-02-11 ENCOUNTER — HOSPITAL ENCOUNTER (OUTPATIENT)
Dept: PULMONOLOGY | Facility: HOSPITAL | Age: 63
Discharge: HOME | End: 2023-02-11
Attending: FAMILY MEDICINE

## 2023-02-11 DIAGNOSIS — J45.40 MODERATE PERSISTENT ASTHMA WITHOUT COMPLICATION: ICD-10-CM

## 2023-02-11 RX ORDER — ALBUTEROL SULFATE 2.5 MG/3ML
2.5 SOLUTION RESPIRATORY (INHALATION) ONCE
Status: COMPLETED | OUTPATIENT
Start: 2023-02-11 | End: 2023-02-11

## 2023-02-11 RX ADMIN — ALBUTEROL SULFATE 2.5 MG: 2.5 SOLUTION RESPIRATORY (INHALATION) at 10:41

## 2023-02-23 ENCOUNTER — OFFICE VISIT (OUTPATIENT)
Dept: FAMILY MEDICINE CLINIC | Facility: CLINIC | Age: 63
End: 2023-02-23

## 2023-02-23 VITALS
DIASTOLIC BLOOD PRESSURE: 78 MMHG | WEIGHT: 186 LBS | HEART RATE: 86 BPM | TEMPERATURE: 97.3 F | OXYGEN SATURATION: 96 % | HEIGHT: 64 IN | BODY MASS INDEX: 31.76 KG/M2 | SYSTOLIC BLOOD PRESSURE: 110 MMHG

## 2023-02-23 DIAGNOSIS — J45.40 MODERATE PERSISTENT ASTHMA WITHOUT COMPLICATION: Primary | ICD-10-CM

## 2023-02-23 DIAGNOSIS — R05.3 CHRONIC COUGH: ICD-10-CM

## 2023-02-23 DIAGNOSIS — G47.33 OSA (OBSTRUCTIVE SLEEP APNEA): ICD-10-CM

## 2023-02-23 NOTE — ASSESSMENT & PLAN NOTE
Wheezing resolved since last visit  Tolerating Advair 1 puff twice daily well  Continue that regimen  Continue Singulair  Continue albuterol as needed  Completed steroids

## 2023-02-23 NOTE — ASSESSMENT & PLAN NOTE
Continue asthma medications and omeprazole  Improving but not resolved  Discussed trial of Flonase over the next few weeks if symptoms do not improve    If fails trial of Flonase will refer to allergist and/or pulmonologist

## 2023-02-23 NOTE — PROGRESS NOTES
Name: Israel Martinez      : 1960      MRN: [id number]  Encounter Provider: Michele Chiang MD  Encounter Date: 2023   Encounter department: [address] [place] [address]  Moderate persistent asthma without complication  Assessment & Plan:  Wheezing resolved since last visit  Tolerating Advair 1 puff twice daily well  Continue that regimen  Continue Singulair  Continue albuterol as needed  Completed steroids  2  SHANE (obstructive sleep apnea)  Assessment & Plan:  Currently stable  Continue current medications  3  Chronic cough  Assessment & Plan:  Continue asthma medications and omeprazole  Improving but not resolved  Discussed trial of Flonase over the next few weeks if symptoms do not improve  If fails trial of Flonase will refer to allergist and/or pulmonologist         Reviewed pulmonary function testing in office today  Follow-up as needed for chronic cough  Subjective      Patient presents today for follow-up  She recently had pulmonary function testing done which was within normal limits  She does state her wheezing has improved since starting Advair twice daily  Patient visited her son [place] over the weekend and her cough improved however upon returning to [place] she began coughing again  She is unsure if this is an environmental allergy causing this cough  Continues to take Allegra daily  Review of Systems   Constitutional: Negative for fatigue and fever  HENT: Negative for sore throat  Eyes: Negative for visual disturbance  Respiratory: Positive for cough  Negative for chest tightness and shortness of breath  Cardiovascular: Negative for chest pain, palpitations and leg swelling  Gastrointestinal: Negative for abdominal pain, constipation, diarrhea and nausea  Endocrine: Negative for cold intolerance and heat intolerance  Genitourinary: Negative for flank pain     Musculoskeletal: Negative for back pain and neck pain  Skin: Negative for rash  Neurological: Negative for headaches  Psychiatric/Behavioral: Negative for behavioral problems and confusion  Current Outpatient Medications on File Prior to Visit   Medication Sig   • albuterol (Ventolin HFA) 90 mcg/act inhaler 1-2 puffs by mouth every 4 hours as needed for wheezing   • fexofenadine (ALLEGRA) 180 MG tablet Take 1 tablet by mouth as needed   • Fluticasone-Salmeterol (Advair Diskus) 250-50 mcg/dose inhaler Inhale 1 puff 2 (two) times a day Rinse mouth after use  • montelukast (SINGULAIR) 10 mg tablet Take 1 tablet (10 mg total) by mouth daily   • olopatadine (PATANOL) 0 1 % ophthalmic solution Administer 1 drop to both eyes 2 (two) times a day   • omeprazole (PriLOSEC) 20 mg delayed release capsule Take 1 capsule (20 mg total) by mouth daily before breakfast   • fexofenadine-pseudoephedrine (ALLEGRA-D)  MG per tablet Take 1 tablet by mouth (Patient not taking: Reported on 1/27/2023)       Objective     /78 (BP Location: Left arm, Patient Position: Sitting, Cuff Size: Large)   Pulse 86   Temp (!) 97 3 °F (36 3 °C)   Ht 5' 4" (1 626 m)   Wt 84 4 kg (186 lb)   LMP  (LMP Unknown)   SpO2 96%   BMI 31 93 kg/m²     Physical Exam  Vitals and nursing note reviewed  Constitutional:       Appearance: She is well-developed  HENT:      Head: Normocephalic and atraumatic  Cardiovascular:      Rate and Rhythm: Normal rate and regular rhythm  Pulmonary:      Effort: Pulmonary effort is normal       Breath sounds: Normal breath sounds  No wheezing  Neurological:      General: No focal deficit present  Mental Status: She is alert     Psychiatric:         Mood and Affect: Mood normal          Behavior: Behavior normal        Jessica Troncoso MD

## 2023-03-09 ENCOUNTER — OFFICE VISIT (OUTPATIENT)
Dept: SLEEP CENTER | Facility: CLINIC | Age: 63
End: 2023-03-09

## 2023-03-09 VITALS
BODY MASS INDEX: 31.51 KG/M2 | DIASTOLIC BLOOD PRESSURE: 70 MMHG | WEIGHT: 184.6 LBS | SYSTOLIC BLOOD PRESSURE: 128 MMHG | HEIGHT: 64 IN | HEART RATE: 78 BPM

## 2023-03-09 DIAGNOSIS — G47.33 OSA (OBSTRUCTIVE SLEEP APNEA): Primary | ICD-10-CM

## 2023-03-09 NOTE — ASSESSMENT & PLAN NOTE
The patient has a history of mild obstructive sleep apnea diagnosed on a home sleep study on 11/20/2020  Her LYNDSEY was noted to be 7 5  She began CPAP treatment and then stopped due to secondary to comfort issues  She decided she would like to have a mandible advancing device made and needed a new sleep study in order for her dentist to make the device  She had a repeat home sleep study on 9/29/2022 which showed an LYNDSEY of 3 9, which was not consistent with sleep apnea  However, she did not sleep on her back and slept poorly that night  Significant snoring was noted  She began using her custom-made mandible advancing device and had a 3 night at home sleep study ordered through her dentist 1 month after beginning treatment  She is unsure of the results as she has not followed up with her dentist yet  I asked the patient to have her dentist office forward us the results  Once we have the results, I will call the patient and review them with her  I will follow back with her in 6 months after she is fully adjusted to using her mandible advancing device and consider ordering a new home study at that time if necessary to evaluate her sleep apnea with her appliance

## 2023-03-09 NOTE — PATIENT INSTRUCTIONS
Please continue using your mandible advancing device as directed by your dentist   If you are able to have your 3-day at home sleep study that your dentist ordered forwarded to our office I would be happy to take a look at it and call you in regards to the results  I then like to see you back in 6 months after you have fully adjusted to using your mandible advancing device and at that time, we can consider ordering a new home sleep study to reevaluate for your sleep apnea using your device  Nursing Support:  When: Monday through Friday 7A-5PM except holidays  Where: Our direct line is [phone]  If you are having a true emergency please call 911  In the event that the line is busy or it is after hours please leave a voice message and we will return your call  Please speak clearly, leaving your full name, birth date, best number to reach you and the reason for your call  Medication refills: We will need the name of the medication, the dosage, the ordering provider, whether you get a 30 or 90 day refill, and the pharmacy name and address  Medications will be ordered by the provider only  Nurses cannot call in prescriptions  Please allow 7 days for medication refills  Physician requested updates: If your provider requested that you call with an update after starting medication, please be ready to provide us the medication and dosage, what time you take your medication, the time you attempt to fall asleep, time you fall asleep, when you wake up, and what time you get out of bed  Sleep Study Results: We will contact you with sleep study results and/or next steps after the physician has reviewed your testing

## 2023-03-09 NOTE — PROGRESS NOTES
Progress Note - [facility] 63 y o  female   XGF:[birth year], MRN: [id number]  3/9/2023          Follow Up Evaluation / Problem:     Mild Obstructive Sleep Apnea      Thank you for the opportunity of participating in the evaluation and care of this patient in the Sleep Clinic at [facility]  HPI: Michael Ladd is a 63y o  year old female who presents for follow-up of her mild obstructive sleep apnea  She had a home sleep study 11/20/2020 which showed an LYNDSEY of 7 5  Patient's weight at the time the study was 173 lb  She was then ordered auto CPAP with a setting of 5-15 cm of H2O  Patient states she used it for several months and did feel better using the machine  She states she had less daytime sleepiness, slept longer, and woke up less  Her CPAP machine was then recalled, and she did receive a new machine through [place]  Patient states she has not been using her CPAP and actually sent it back to [place] due to comfort issues  She states she was having difficulty sleeping with it  At her last appointment on 9/8/2022, I ordered her a new home sleep study as she was pursuing having a custom mandible advancing device made through her dentist office and needed a new study in order to have the device made  She had the study completed on 9/29/2022 which did not show obstructive sleep apnea as her LYNDSEY was 3 9  As of note, she slept poorly on the test and did not sleep on her back  She began using her custom made mandible advancing device in early February and had a 3 night home sleep study performed while using the device ordered by her dentist   She is unsure of the results as she did not follow-up with her dentist as this point  She states she is still adjusting to using her mandible advancing device but feels it is helping    Her  notes her to be snoring less and she is feeling less sleepy during the day  Current Outpatient Medications:   •  albuterol (Ventolin HFA) 90 mcg/act inhaler, 1-2 puffs by mouth every 4 hours as needed for wheezing, Disp: 3 each, Rfl: 3  •  fexofenadine (ALLEGRA) 180 MG tablet, Take 1 tablet by mouth as needed, Disp: , Rfl:   •  fexofenadine-pseudoephedrine (ALLEGRA-D)  MG per tablet, Take 1 tablet by mouth, Disp: , Rfl:   •  Fluticasone-Salmeterol (Advair Diskus) 250-50 mcg/dose inhaler, Inhale 1 puff 2 (two) times a day Rinse mouth after use , Disp: 180 blister, Rfl: 0  •  montelukast (SINGULAIR) 10 mg tablet, Take 1 tablet (10 mg total) by mouth daily, Disp: 90 tablet, Rfl: 3  •  olopatadine (PATANOL) 0 1 % ophthalmic solution, Administer 1 drop to both eyes 2 (two) times a day, Disp: 5 mL, Rfl: 3  •  omeprazole (PriLOSEC) 20 mg delayed release capsule, Take 1 capsule (20 mg total) by mouth daily before breakfast, Disp: 90 capsule, Rfl: 3    [place] Sleepiness Scale  Sitting and reading: Moderate chance of dozing  Watching TV: Moderate chance of dozing  Sitting, inactive in a public place (e g  a theatre or a meeting): Would never doze  As a passenger in a car for an hour without a break: Slight chance of dozing  Lying down to rest in the afternoon when circumstances permit: Would never doze  Sitting and talking to someone: Would never doze  Sitting quietly after a lunch without alcohol: Would never doze  In a car, while stopped for a few minutes in traffic: Slight chance of dozing  Total score: 6              Vitals:    03/09/23 1552   BP: 128/70   BP Location: Left arm   Patient Position: Sitting   Cuff Size: Standard   Pulse: 78   Weight: 83 7 kg (184 lb 9 6 oz)   Height: 5' 4" (1 626 m)       Body mass index is 31 69 kg/m²              EPWORTH SLEEPINESS SCORE  Total score: 6      Past History Since Last Sleep Center Visit:     Had a new home sleep study performed on 9/29/2022 with an LYNDSEY of 3 9  Had a custom mandible advancing device made through her dentist and began using it in February  Had a 3 night at home sleep study ordered by her dentist while wearing her mandible advancing device  The review of systems and following portions of the patient's history were reviewed and updated as appropriate: allergies, current medications, past family history, past medical history, past social history, past surgical history, and problem list         OBJECTIVE      Physical Exam:     General Appearance:   Alert, cooperative, no distress, appears stated age     ASSESSMENT / PLAN    1  SHANE (obstructive sleep apnea)  Assessment & Plan:  The patient has a history of mild obstructive sleep apnea diagnosed on a home sleep study on 11/20/2020  Her LYNDSEY was noted to be 7 5  She began CPAP treatment and then stopped due to secondary to comfort issues  She decided she would like to have a mandible advancing device made and needed a new sleep study in order for her dentist to make the device  She had a repeat home sleep study on 9/29/2022 which showed an LYNDSEY of 3 9, which was not consistent with sleep apnea  However, she did not sleep on her back and slept poorly that night  Significant snoring was noted  She began using her custom-made mandible advancing device and had a 3 night at home sleep study ordered through her dentist 1 month after beginning treatment  She is unsure of the results as she has not followed up with her dentist yet  I asked the patient to have her dentist office forward us the results  Once we have the results, I will call the patient and review them with her  I will follow back with her in 6 months after she is fully adjusted to using her mandible advancing device and consider ordering a new home study at that time if necessary to evaluate her sleep apnea with her appliance             Counseling / [id number] of Care    I have spent a total time of 20 minutes on 03/09/23 in caring for this patient including Diagnostic results, Prognosis, Instructions for management, Impressions, Documenting in the medical record, Reviewing / ordering tests, medicine, procedures   and Obtaining or reviewing history    The following instructions have been given to the patient today:    Patient Instructions   Please continue using your mandible advancing device as directed by your dentist   If you are able to have your 3-day at home sleep study that your dentist ordered forwarded to our office I would be happy to take a look at it and call you in regards to the results  I then like to see you back in 6 months after you have fully adjusted to using your mandible advancing device and at that time, we can consider ordering a new home sleep study to reevaluate for your sleep apnea using your device  Nursing Support:  When: Monday through Friday 7A-5PM except holidays  Where: Our direct line is [phone]  If you are having a true emergency please call 911  In the event that the line is busy or it is after hours please leave a voice message and we will return your call  Please speak clearly, leaving your full name, birth date, best number to reach you and the reason for your call  Medication refills: We will need the name of the medication, the dosage, the ordering provider, whether you get a 30 or 90 day refill, and the pharmacy name and address  Medications will be ordered by the provider only  Nurses cannot call in prescriptions  Please allow 7 days for medication refills  Physician requested updates: If your provider requested that you call with an update after starting medication, please be ready to provide us the medication and dosage, what time you take your medication, the time you attempt to fall asleep, time you fall asleep, when you wake up, and what time you get out of bed  Sleep Study Results: We will contact you with sleep study results and/or next steps after the physician has reviewed your testing             Kayla Jewell PA-C  [address]

## 2023-06-05 DIAGNOSIS — J45.40 MODERATE PERSISTENT ASTHMA WITHOUT COMPLICATION: ICD-10-CM

## 2023-06-05 RX ORDER — MONTELUKAST SODIUM 10 MG/1
10 TABLET ORAL DAILY
Qty: 90 TABLET | Refills: 3 | Status: SHIPPED | OUTPATIENT
Start: 2023-06-05

## 2023-06-19 DIAGNOSIS — J45.40 MODERATE PERSISTENT ASTHMA WITHOUT COMPLICATION: ICD-10-CM

## 2023-06-19 RX ORDER — FLUTICASONE PROPIONATE AND SALMETEROL 250; 50 UG/1; UG/1
1 POWDER RESPIRATORY (INHALATION) 2 TIMES DAILY
Qty: 180 BLISTER | Refills: 0 | Status: SHIPPED | OUTPATIENT
Start: 2023-06-19 | End: 2024-06-13

## 2023-08-24 ENCOUNTER — ANNUAL EXAM (OUTPATIENT)
Dept: GYNECOLOGY | Facility: CLINIC | Age: 63
End: 2023-08-24
Payer: COMMERCIAL

## 2023-08-24 VITALS
DIASTOLIC BLOOD PRESSURE: 84 MMHG | WEIGHT: 185 LBS | SYSTOLIC BLOOD PRESSURE: 140 MMHG | HEIGHT: 64 IN | BODY MASS INDEX: 31.58 KG/M2

## 2023-08-24 DIAGNOSIS — Z01.419 ENCOUNTER FOR ANNUAL ROUTINE GYNECOLOGICAL EXAMINATION: ICD-10-CM

## 2023-08-24 DIAGNOSIS — Z12.31 SCREENING MAMMOGRAM FOR BREAST CANCER: Primary | ICD-10-CM

## 2023-08-24 PROCEDURE — S0612 ANNUAL GYNECOLOGICAL EXAMINA: HCPCS | Performed by: OBSTETRICS & GYNECOLOGY

## 2023-08-24 NOTE — PROGRESS NOTES
Assessment/Plan:    Normal breast and GYN exam  Normal mammogram 2022  Normal Pap smear negative HPV   Normal colonoscopy     Plan: Rx mammogram.  Recommend healthy diet and exercise. Subjective: G3, P2      Patient ID: Courtney Mejias is a 63 y.o. female presents to the office for annual exam with no complaints. Denies any pelvic pain vaginal bleeding or dyspareunia. Denies any breast bowel or bladder issues. No change in family history. Maternal grandmother (breast cancer). Medications reviewed. 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 Review of Systems   Constitutional: Negative. Negative for fatigue, fever and unexpected weight change. HENT: Negative. Eyes: Negative. Respiratory: Negative. Negative for chest tightness, shortness of breath, wheezing and stridor. Cardiovascular: Negative. Negative for chest pain, palpitations and leg swelling. Gastrointestinal: Negative. Negative for abdominal pain, blood in stool, diarrhea, nausea, rectal pain and vomiting. Endocrine: Negative. Genitourinary: Negative for dysuria, frequency, vaginal bleeding, vaginal discharge and vaginal pain. Musculoskeletal: Negative. Skin: Negative. Allergic/Immunologic: Negative. Neurological: Negative. Hematological: Negative. Psychiatric/Behavioral: Negative. All other systems reviewed and are negative. Objective:      /84   Ht 5' 4" (1.626 m)   Wt 83.9 kg (185 lb)   LMP  (LMP Unknown)   BMI 31.76 kg/m²          Physical Exam  Constitutional:       Appearance: She is well-developed. HENT:      Head: Normocephalic and atraumatic. Neck:      Thyroid: No thyromegaly. Trachea: No tracheal deviation. Cardiovascular:      Rate and Rhythm: Normal rate and regular rhythm. Heart sounds: Normal heart sounds. Pulmonary:      Effort: Pulmonary effort is normal. No respiratory distress. Breath sounds: Normal breath sounds. No stridor. No wheezing or rales. Chest:      Chest wall: No tenderness. Breasts:     Breasts are symmetrical.      Right: No inverted nipple, mass, nipple discharge, skin change or tenderness. Left: No inverted nipple, mass, nipple discharge, skin change or tenderness. Abdominal:      General: Bowel sounds are normal. There is no distension. Palpations: Abdomen is soft. There is no mass. Tenderness: There is no abdominal tenderness. There is no guarding or rebound. Hernia: No hernia is present. There is no hernia in the left inguinal area. Genitourinary:     Labia:         Right: No rash, tenderness, lesion or injury. Left: No rash, tenderness, lesion or injury. Vagina: Normal. No signs of injury and foreign body. No vaginal discharge, erythema, tenderness or bleeding. Cervix: No cervical motion tenderness, discharge or friability. Uterus: Not deviated, not enlarged, not fixed and not tender. Adnexa:         Right: No mass, tenderness or fullness. Left: No mass, tenderness or fullness. Rectum: No mass, anal fissure, external hemorrhoid or internal hemorrhoid. Musculoskeletal:      Cervical back: Normal range of motion and neck supple. Lymphadenopathy:      Lower Body: No right inguinal adenopathy. No left inguinal adenopathy. Skin:     General: Skin is warm and dry. Neurological:      Mental Status: She is alert and oriented to person, place, and time. Psychiatric:         Behavior: Behavior normal.         Thought Content:  Thought content normal.         Judgment: Judgment normal.

## 2023-09-12 DIAGNOSIS — J45.40 MODERATE PERSISTENT ASTHMA WITHOUT COMPLICATION: ICD-10-CM

## 2023-09-12 RX ORDER — FLUTICASONE PROPIONATE AND SALMETEROL 250; 50 UG/1; UG/1
POWDER RESPIRATORY (INHALATION)
Qty: 180 BLISTER | Refills: 0 | Status: SHIPPED | OUTPATIENT
Start: 2023-09-12

## 2023-09-19 ENCOUNTER — OFFICE VISIT (OUTPATIENT)
Dept: SLEEP CENTER | Facility: CLINIC | Age: 63
End: 2023-09-19
Payer: COMMERCIAL

## 2023-09-19 VITALS — DIASTOLIC BLOOD PRESSURE: 79 MMHG | HEIGHT: 64 IN | BODY MASS INDEX: 31.76 KG/M2 | SYSTOLIC BLOOD PRESSURE: 116 MMHG

## 2023-09-19 DIAGNOSIS — G47.33 OSA (OBSTRUCTIVE SLEEP APNEA): Primary | ICD-10-CM

## 2023-09-19 PROCEDURE — 99213 OFFICE O/P EST LOW 20 MIN: CPT | Performed by: PHYSICIAN ASSISTANT

## 2023-09-19 NOTE — PROGRESS NOTES
Progress Note - [facility] [address] [facility] 66 y.o. female   [facility]:[birth year], MRN: [id number]  9/19/2023          Follow Up Evaluation / Problem:     Mild Obstructive Sleep Apnea      Thank you for the opportunity of participating in the evaluation and care of this patient in the Sleep Clinic at [address] [place] [address]. HPI: Dalia Resendez is a 66y.o. year old female. She had a home sleep study 11/20/2020 which showed an LYNDSEY of 7.5.  Patient's weight at the time the study was 173 lb.  She was then ordered auto CPAP with a setting of 5-15 cm of H2O.  Patient states she used it for several months and did feel better using the machine.  She states she had less daytime sleepiness, slept longer, and woke up less.  Her CPAP machine was then recalled, and she did receive a new machine through [facility] states she has not been using her CPAP and actually sent it back to [facility] due to comfort issues. Josie Mata states she was having difficulty sleeping with it. I ordered her a new home sleep study as she was pursuing having a custom mandible advancing device made through her dentist office and needed a new study in order to have the device made. She had the study completed on 9/29/2022 which did not show obstructive sleep apnea as her LYNDSEY was 3.9. As of note, she slept poorly on the test and did not sleep on her back. She began using her custom made mandible advancing device in early February and had a 3 night home sleep study performed while using the device ordered by her dentist.  Patient brought along a copy of her 3 night sleep study which still showed mild obstructive sleep apnea with use of her mandible advancing device with an AHI of 5. She does wear her mandible advancing device nightly and has gotten used to using it. She does not feel there is been any significant improvement with her symptoms.         Current Outpatient Medications:   •  albuterol (Ventolin HFA) 90 mcg/act inhaler, 1-2 puffs by mouth every 4 hours as needed for wheezing, Disp: 3 each, Rfl: 3  •  fexofenadine (ALLEGRA) 180 MG tablet, Take 1 tablet by mouth as needed, Disp: , Rfl:   •  fexofenadine-pseudoephedrine (ALLEGRA-D)  MG per tablet, Take 1 tablet by mouth, Disp: , Rfl:   •  Fluticasone-Salmeterol (Advair) 250-50 mcg/dose inhaler, INHALE 1 PUFF BY MOUTH TWO TIMES DAILY, RINSE MOUTH AFTER USE, Disp: 180 blister, Rfl: 0  •  montelukast (SINGULAIR) 10 mg tablet, Take 1 tablet (10 mg total) by mouth daily, Disp: 90 tablet, Rfl: 3  •  olopatadine (PATANOL) 0.1 % ophthalmic solution, Administer 1 drop to both eyes 2 (two) times a day, Disp: 5 mL, Rfl: 3  •  omeprazole (PriLOSEC) 20 mg delayed release capsule, Take 1 capsule (20 mg total) by mouth daily before breakfast, Disp: 90 capsule, Rfl: 3    How likely are you to doze off or fall asleep in the following situations, in contrast to feeling just tired? Sitting and reading: High chance of dozing  Watching TV: High chance of dozing  Sitting, inactive in a public place (e.g. a theatre or a meeting): Slight chance of dozing  As a passenger in a car for an hour without a break: Would never doze  Lying down to rest in the afternoon when circumstances permit: Would never doze  Sitting and talking to someone: Would never doze  Sitting quietly after a lunch without alcohol: Would never doze  In a car, while stopped for a few minutes in traffic: Slight chance of dozing  Total score: 8              Vitals:    09/19/23 1126   BP: 116/79   BP Location: Left arm   Patient Position: Sitting   Cuff Size: Large   Height: 5' 4" (1.626 m)       Body mass index is 31.76 kg/m².             EPWORTH SLEEPINESS SCORE  Total score: 8      Past History Since Last Sleep Center Visit:     Using custom made mandible advancing device nightly      The review of systems and following portions of the patient's history were reviewed and updated as appropriate: allergies, current medications, past family history, past medical history, past social history, past surgical history, and problem list.        OBJECTIVE          Physical Exam:     General Appearance:   Alert, cooperative, no distress, appears stated age         ASSESSMENT / PLAN    1. SHANE (obstructive sleep apnea)  Assessment & Plan:  Patient has a history of mild obstructive sleep apnea and previously used CPAP. She is currently using a mandible advancing device that was custom made through her dentist.  She had a 3 night at home sleep study done while wearing her device and January. It showed an overall AHI of 5. I ordered a home sleep study today to reassess her sleep apnea using the mandible advancing device. If her sleep apnea is not fully treated with the device she would consider possibly trying CPAP again. Orders:  -     Home Study; Future; Expected date: 09/19/2023         Counseling / Coordination of Care    I have spent a total time of 24 minutes on 09/19/23 in caring for this patient including Risks and benefits of tx options, Instructions for management, Patient and family education, Impressions, Counseling / Coordination of care, Documenting in the medical record, Reviewing / ordering tests, medicine, procedures   and Obtaining or reviewing history  . The following instructions have been given to the patient today:    Patient Instructions   Please schedule your home sleep study as soon as possible. Please wear your mandible advancing device as you do nightly. Your study should be read 1 to 2 weeks after it is completed. A nurse will call you and review the results. Once we have the results, we can base her follow-up on that. Nursing Support:  When: Monday through Friday 7A-5PM except holidays  Where: Our direct line is [phone]. If you are having a true emergency please call 911.   In the event that the line is busy or it is after hours please leave a voice message and we will return your call. Please speak clearly, leaving your full name, birth date, best number to reach you and the reason for your call. Medication refills: We will need the name of the medication, the dosage, the ordering provider, whether you get a 30 or 90 day refill, and the pharmacy name and address. Medications will be ordered by the provider only. Nurses cannot call in prescriptions. Please allow 7 days for medication refills. Physician requested updates: If your provider requested that you call with an update after starting medication, please be ready to provide us the medication and dosage, what time you take your medication, the time you attempt to fall asleep, time you fall asleep, when you wake up, and what time you get out of bed. Sleep Study Results: We will contact you with sleep study results and/or next steps after the physician has reviewed your testing.            Renuka Rabago PA-C  [address]

## 2023-09-19 NOTE — ASSESSMENT & PLAN NOTE
Patient has a history of mild obstructive sleep apnea and previously used CPAP. She is currently using a mandible advancing device that was custom made through her dentist.  She had a 3 night at home sleep study done while wearing her device and January. It showed an overall AHI of 5. I ordered a home sleep study today to reassess her sleep apnea using the mandible advancing device. If her sleep apnea is not fully treated with the device she would consider possibly trying CPAP again.

## 2023-09-19 NOTE — PATIENT INSTRUCTIONS
Please schedule your home sleep study as soon as possible. Please wear your mandible advancing device as you do nightly. Your study should be read 1 to 2 weeks after it is completed. A nurse will call you and review the results. Once we have the results, we can base her follow-up on that. Nursing Support:  When: Monday through Friday 7A-5PM except holidays  Where: Our direct line is [phone]. If you are having a true emergency please call 911. In the event that the line is busy or it is after hours please leave a voice message and we will return your call. Please speak clearly, leaving your full name, birth date, best number to reach you and the reason for your call. Medication refills: We will need the name of the medication, the dosage, the ordering provider, whether you get a 30 or 90 day refill, and the pharmacy name and address. Medications will be ordered by the provider only. Nurses cannot call in prescriptions. Please allow 7 days for medication refills. Physician requested updates: If your provider requested that you call with an update after starting medication, please be ready to provide us the medication and dosage, what time you take your medication, the time you attempt to fall asleep, time you fall asleep, when you wake up, and what time you get out of bed. Sleep Study Results: We will contact you with sleep study results and/or next steps after the physician has reviewed your testing.

## 2023-10-12 ENCOUNTER — HOSPITAL ENCOUNTER (OUTPATIENT)
Dept: SLEEP CENTER | Facility: CLINIC | Age: 63
Discharge: HOME | End: 2023-10-12
Payer: COMMERCIAL

## 2023-10-12 DIAGNOSIS — G47.33 OSA (OBSTRUCTIVE SLEEP APNEA): ICD-10-CM

## 2023-10-12 PROCEDURE — G0399 HOME SLEEP TEST/TYPE 3 PORTA: HCPCS

## 2023-10-13 NOTE — PROGRESS NOTES
Home Sleep Study Documentation    HOME STUDY DEVICE: Noxturnal yes                                           Cathy G3 no      Pre-Sleep Home Study:    Set-up and instructions performed by: Yana Bernal     Technician performed demonstration for Patient: yes    Return demonstration performed by Patient: yes    Written instructions provided to Patient: yes    Patient signed consent form: yes        Post-Sleep Home Study:    Additional comments by Patient:         Home Sleep Study Failed:no:    Failure reason: N/A    Reported or Detected: N/A    Scored by: WENCESLAO Gonzalez

## 2023-10-17 ENCOUNTER — TELEPHONE (OUTPATIENT)
Dept: SLEEP CENTER | Facility: CLINIC | Age: 63
End: 2023-10-17

## 2023-10-17 PROCEDURE — 95806 SLEEP STUDY UNATT&RESP EFFT: CPT | Performed by: PSYCHIATRY & NEUROLOGY

## 2023-10-17 NOTE — TELEPHONE ENCOUNTER
----- Message from Jessica Gonzalez PA-C sent at 10/17/2023  9:03 AM EDT -----  Patient's mild obstructive sleep apnea is shown to be treated with her mandible advancing device. We would recommend wearing her device nightly. Follow-up with our office as needed.

## 2023-10-17 NOTE — TELEPHONE ENCOUNTER
Spoke to the patient advised sleep study results.    Offered follow up appointment she will call if needed

## 2023-12-05 ENCOUNTER — HOSPITAL ENCOUNTER (OUTPATIENT)
Dept: RADIOLOGY | Age: 63
Discharge: HOME | End: 2023-12-05
Payer: COMMERCIAL

## 2023-12-05 VITALS — BODY MASS INDEX: 31.58 KG/M2 | WEIGHT: 185 LBS | HEIGHT: 64 IN

## 2023-12-05 DIAGNOSIS — Z12.31 SCREENING MAMMOGRAM FOR BREAST CANCER: ICD-10-CM

## 2023-12-05 PROCEDURE — 77067 SCR MAMMO BI INCL CAD: CPT

## 2023-12-05 PROCEDURE — 77063 BREAST TOMOSYNTHESIS BI: CPT

## 2023-12-10 DIAGNOSIS — J45.40 MODERATE PERSISTENT ASTHMA WITHOUT COMPLICATION: ICD-10-CM

## 2023-12-11 RX ORDER — FLUTICASONE PROPIONATE AND SALMETEROL 250; 50 UG/1; UG/1
POWDER RESPIRATORY (INHALATION)
Qty: 180 BLISTER | Refills: 0 | Status: SHIPPED | OUTPATIENT
Start: 2023-12-11

## 2023-12-18 ENCOUNTER — OFFICE VISIT (OUTPATIENT)
Dept: URGENT CARE | Age: 63
End: 2023-12-18
Payer: COMMERCIAL

## 2023-12-18 ENCOUNTER — NURSE TRIAGE (OUTPATIENT)
Age: 63
End: 2023-12-18

## 2023-12-18 VITALS
DIASTOLIC BLOOD PRESSURE: 55 MMHG | HEART RATE: 91 BPM | SYSTOLIC BLOOD PRESSURE: 105 MMHG | TEMPERATURE: 97.6 F | RESPIRATION RATE: 20 BRPM | OXYGEN SATURATION: 97 %

## 2023-12-18 DIAGNOSIS — J45.901 ASTHMA WITH ACUTE EXACERBATION, UNSPECIFIED ASTHMA SEVERITY, UNSPECIFIED WHETHER PERSISTENT: Primary | ICD-10-CM

## 2023-12-18 PROCEDURE — 99213 OFFICE O/P EST LOW 20 MIN: CPT | Performed by: NURSE PRACTITIONER

## 2023-12-18 RX ORDER — BENZONATATE 200 MG/1
200 CAPSULE ORAL 3 TIMES DAILY PRN
Qty: 20 CAPSULE | Refills: 0 | Status: SHIPPED | OUTPATIENT
Start: 2023-12-18

## 2023-12-18 RX ORDER — IPRATROPIUM BROMIDE AND ALBUTEROL SULFATE 2.5; .5 MG/3ML; MG/3ML
3 SOLUTION RESPIRATORY (INHALATION) ONCE
Status: COMPLETED | OUTPATIENT
Start: 2023-12-18 | End: 2023-12-18

## 2023-12-18 RX ORDER — PREDNISONE 20 MG/1
20 TABLET ORAL 2 TIMES DAILY WITH MEALS
Qty: 10 TABLET | Refills: 0 | Status: SHIPPED | OUTPATIENT
Start: 2023-12-18 | End: 2023-12-23

## 2023-12-18 RX ADMIN — IPRATROPIUM BROMIDE AND ALBUTEROL SULFATE 3 ML: 2.5; .5 SOLUTION RESPIRATORY (INHALATION) at 18:13

## 2023-12-18 NOTE — TELEPHONE ENCOUNTER
"Pt calling with sinus pain and pressure.  C/o moderate sinus HA, cough, mild SOB and left rib pain.  Left rib pain occurred last night and has not reoccurred today.  SOB is only when walking up stairs.  Pt tested negative for Covid.  Pt has asthma and is using rescue inhaler more frequently.  Called office, spoke to Haleigh, no appts available today.  Pt will go to [facility] for evaluation today.  Appt made at [facility] for pt.           Reason for Disposition   Patient wants to be seen    Answer Assessment - Initial Assessment Questions  1. LOCATION: \"Where does it hurt?\"       HA in the front of my head   2. ONSET: \"When did the sinus pain start?\"  (e.g., hours, days)       yesterday  3. SEVERITY: \"How bad is the pain?\"   (Scale 1-10; mild, moderate or severe)    - MILD (1-3): doesn't interfere with normal activities     - MODERATE (4-7): interferes with normal activities (e.g., work or school) or awakens from sleep    - SEVERE (8-10): excruciating pain and patient unable to do any normal activities         moderate  4. RECURRENT SYMPTOM: \"Have you ever had sinus problems before?\" If so, ask: \"When was the last time?\" and \"What happened that time?\"       Yes  5. NASAL CONGESTION: \"Is the nose blocked?\" If so, ask, \"Can you open it or must you breathe through the mouth?\"      yes  6. NASAL DISCHARGE: \"Do you have discharge from your nose?\" If so ask, \"What color?\"      yes  7. FEVER: \"Do you have a fever?\" If so, ask: \"What is it, how was it measured, and when did it start?\"       no  8. OTHER SYMPTOMS: \"Do you have any other symptoms?\" (e.g., sore throat, cough, earache, difficulty breathing)      Cough, some SOB with ambulation   9. PREGNANCY: \"Is there any chance you are pregnant?\" \"When was your last menstrual period?\"      no    Protocols used: Sinus Pain and Congestion-ADULT-[place]    "

## 2023-12-18 NOTE — PROGRESS NOTES
[facility]        NAME: Anni Quezada is a 63 y.o. female  : 1960    MRN: [id number]  DATE: 2023  TIME: 6:05 PM    Assessment and Plan   Asthma with acute exacerbation, unspecified asthma severity, unspecified whether persistent [J45.901]  1. Asthma with acute exacerbation, unspecified asthma severity, unspecified whether persistent  ipratropium-albuterol (DUO-NEB) 0.5-2.5 mg/3 mL inhalation solution 3 mL    benzonatate (TESSALON) 200 MG capsule    predniSONE 20 mg tablet            Patient Instructions     Nebulizer treatment at the clinic  Rx meds sent to pharmacy   Follow up with PCP in 3-5 days.  Proceed to  ER if symptoms worsen.    Chief Complaint     Chief Complaint   Patient presents with   • Cough   • Headache     Sinus pressure  between eyes , headache and cough since yesterday.         History of Present Illness       HPI  Reports sinus congestion, cough and headache that started yesterday. Hx of asthma. Shortness of breath with exertion. Wheezing. Has albuterol inhaler at home.     Review of Systems   Review of Systems   Constitutional:  Negative for fever.   HENT:  Positive for sinus pressure. Negative for sore throat.    Respiratory:  Positive for cough, chest tightness, shortness of breath and wheezing.    Cardiovascular:  Negative for chest pain and palpitations.   Neurological:  Negative for light-headedness and headaches.         Current Medications       Current Outpatient Medications:   •  benzonatate (TESSALON) 200 MG capsule, Take 1 capsule (200 mg total) by mouth 3 (three) times a day as needed for cough, Disp: 20 capsule, Rfl: 0  •  predniSONE 20 mg tablet, Take 1 tablet (20 mg total) by mouth 2 (two) times a day with meals for 5 days, Disp: 10 tablet, Rfl: 0  •  albuterol (Ventolin HFA) 90 mcg/act inhaler, 1-2 puffs by mouth every 4 hours as needed for wheezing, Disp: 3 each, Rfl: 3  •  fexofenadine (ALLEGRA) 180 MG tablet, Take 1 tablet by mouth as needed,  Disp: , Rfl:   •  fexofenadine-pseudoephedrine (ALLEGRA-D)  MG per tablet, Take 1 tablet by mouth, Disp: , Rfl:   •  Fluticasone-Salmeterol (Advair) 250-50 mcg/dose inhaler, INHALE 1 PUFF BY MOUTH TWO TIMES DAILY, RINSE MOUTH AFTER USE, Disp: 180 blister, Rfl: 0  •  montelukast (SINGULAIR) 10 mg tablet, Take 1 tablet (10 mg total) by mouth daily, Disp: 90 tablet, Rfl: 3  •  olopatadine (PATANOL) 0.1 % ophthalmic solution, Administer 1 drop to both eyes 2 (two) times a day, Disp: 5 mL, Rfl: 3  •  omeprazole (PriLOSEC) 20 mg delayed release capsule, Take 1 capsule (20 mg total) by mouth daily before breakfast, Disp: 90 capsule, Rfl: 3    Current Facility-Administered Medications:   •  ipratropium-albuterol (DUO-NEB) 0.5-2.5 mg/3 mL inhalation solution 3 mL, 3 mL, Nebulization, Once, NIKHIL Alonso    Current Allergies     Allergies as of 12/18/2023 - Reviewed 12/18/2023   Allergen Reaction Noted   • Other  07/14/2020            The following portions of the patient's history were reviewed and updated as appropriate: allergies, current medications, past family history, past medical history, past social history, past surgical history and problem list.     Past Medical History:   Diagnosis Date   • Asthma    • Closed fracture of neck of left radius 9/29/2021   • Encounter for surveillance of contraceptives, unspecified 07/30/2013   • H/O mammogram    • Papanicolaou smear 04/02/2018    NEG   • Plantar fasciitis 08/09/2017    Resolved:  November 24, 2017   • Post-menopausal        Past Surgical History:   Procedure Laterality Date   • CARPAL TUNNEL RELEASE Bilateral 2016   • COLONOSCOPY  2012    normal   • MAMMO (HISTORICAL) Bilateral 09/27/2018    No evidence of malignancy   • RI COLONOSCOPY FLX DX W/COLLJ SPEC WHEN PFRMD N/A 11/26/2018    Procedure: COLONOSCOPY;  Surgeon: Anthony De Leon MD;  Location: [facility];  Service: Gastroenterology   • VAGINAL DELIVERY      x3       Family History   Problem Relation  Age of Onset   • Osteoporosis Mother    • Dementia Mother    • Arthritis Mother         Knees   • Hearing loss Mother    • Asthma Father            • No Known Problems Sister    • Diabetes Daughter    • Polycystic ovary syndrome Daughter    • No Known Problems Daughter    • Breast cancer Maternal Grandmother 40   • Lung cancer Maternal Grandfather 60   • No Known Problems Paternal Grandfather    • Ulcerative colitis Son    • No Known Problems Son    • No Known Problems Maternal Aunt    • No Known Problems Maternal Aunt    • No Known Problems Maternal Aunt    • No Known Problems Maternal Aunt    • No Known Problems Maternal Aunt    • No Known Problems Paternal Aunt    • No Known Problems Paternal Aunt    • No Known Problems Paternal Aunt    • No Known Problems Paternal Aunt    • No Known Problems Paternal Aunt    • No Known Problems Paternal Aunt    • COPD Cousin          Medications have been verified.        Objective   /55 (BP Location: Right arm)   Pulse 91   Temp 97.6 °F (36.4 °C) (Temporal)   Resp 20   LMP  (LMP Unknown)   SpO2 97%   No LMP recorded (lmp unknown). Patient is postmenopausal.       Physical Exam     Physical Exam  Constitutional:       Appearance: She is not ill-appearing or diaphoretic.   HENT:      Head:      Comments: Tenderness with palpation of the paranasal sinuses     Right Ear: Tympanic membrane normal.      Left Ear: Tympanic membrane normal.      Nose: Rhinorrhea present.      Mouth/Throat:      Pharynx: No posterior oropharyngeal erythema.      Comments: Post nasal drip  Cardiovascular:      Rate and Rhythm: Regular rhythm.      Heart sounds: Normal heart sounds.   Pulmonary:      Effort: Pulmonary effort is normal.      Breath sounds: Wheezing (at end of inspiration, bilateral lower lobes) present.

## 2024-01-25 DIAGNOSIS — R05.3 CHRONIC COUGH: ICD-10-CM

## 2024-01-25 RX ORDER — OMEPRAZOLE 20 MG/1
20 CAPSULE, DELAYED RELEASE ORAL
Qty: 30 CAPSULE | Refills: 5 | Status: SHIPPED | OUTPATIENT
Start: 2024-01-25

## 2024-01-27 NOTE — PROGRESS NOTES
Assessment/Plan:    No problem-specific Assessment & Plan notes found for this encounter  Diagnoses and all orders for this visit:    Encounter for well woman exam    Encounter for screening breast examination    Encounter for screening mammogram for breast cancer  -     Mammo screening bilateral w 3d & cad; Future          Subjective:      Patient ID: Alexandra Morales is a 63 y o  female  Pt presents for her annual exam today--  She has no complaints  She has no bleeding or pelvic pain--postmeno  Bowel and bladder are regular  Colonoscopy--2018  No breast concerns today  Last mammo--2021      No pap today  rx mammo  Daily ca, d      The following portions of the patient's history were reviewed and updated as appropriate: allergies, current medications, past family history, past medical history, past social history, past surgical history and problem list     Review of Systems   Constitutional: Negative for chills, fever and unexpected weight change  Gastrointestinal: Negative for abdominal pain, blood in stool, constipation and diarrhea  Genitourinary: Negative  Objective:      /80   Ht 5' 4" (1 626 m)   Wt 83 5 kg (184 lb)   LMP  (LMP Unknown)   BMI 31 58 kg/m²          Physical Exam  Vitals and nursing note reviewed  Constitutional:       Appearance: She is well-developed  HENT:      Head: Normocephalic and atraumatic  Chest:   Breasts:      Right: No inverted nipple, mass, nipple discharge or skin change  Left: No inverted nipple, mass, nipple discharge or skin change  Abdominal:      Palpations: Abdomen is soft  Genitourinary:     Exam position: Supine  Labia:         Right: No rash, tenderness or lesion  Left: No rash, tenderness or lesion  Vagina: Normal       Cervix: No cervical motion tenderness, discharge or friability  Adnexa:         Right: No mass, tenderness or fullness  Left: No mass, tenderness or fullness  Musculoskeletal:      Cervical back: Normal range of motion  Lymphadenopathy:      Lower Body: No right inguinal adenopathy  No left inguinal adenopathy  Anxiety

## 2024-02-21 DIAGNOSIS — H10.13 ALLERGIC CONJUNCTIVITIS OF BOTH EYES: ICD-10-CM

## 2024-02-21 RX ORDER — OLOPATADINE HYDROCHLORIDE 1 MG/ML
1 SOLUTION/ DROPS OPHTHALMIC 2 TIMES DAILY
Qty: 5 ML | Refills: 3 | Status: SHIPPED | OUTPATIENT
Start: 2024-02-21

## 2024-02-28 ENCOUNTER — TELEPHONE (OUTPATIENT)
Age: 64
End: 2024-02-28

## 2024-02-29 DIAGNOSIS — D72.10 EOSINOPHILIA, UNSPECIFIED TYPE: ICD-10-CM

## 2024-02-29 DIAGNOSIS — E78.00 HYPERCHOLESTEROLEMIA: Primary | ICD-10-CM

## 2024-02-29 DIAGNOSIS — R73.03 PREDIABETES: ICD-10-CM

## 2024-02-29 DIAGNOSIS — Z83.49 FHX: THYROID DISEASE: ICD-10-CM

## 2024-03-01 NOTE — TELEPHONE ENCOUNTER
Lm on patient identified voicemail and to contact the office with any questions or concerns.  
Patient scheduled a physical with Dr. Resendiz for 03/12 and is requesting that blood work be placed   
Intact

## 2024-03-04 DIAGNOSIS — J45.40 MODERATE PERSISTENT ASTHMA WITHOUT COMPLICATION: ICD-10-CM

## 2024-03-04 RX ORDER — FLUTICASONE PROPIONATE AND SALMETEROL 250; 50 UG/1; UG/1
POWDER RESPIRATORY (INHALATION)
Qty: 180 BLISTER | Refills: 0 | Status: SHIPPED | OUTPATIENT
Start: 2024-03-04

## 2024-03-05 ENCOUNTER — RA CDI HCC (OUTPATIENT)
Dept: OTHER | Facility: HOSPITAL | Age: 64
End: 2024-03-05

## 2024-03-05 ENCOUNTER — APPOINTMENT (OUTPATIENT)
Dept: LAB | Facility: CLINIC | Age: 64
End: 2024-03-05
Payer: COMMERCIAL

## 2024-03-05 LAB
ALBUMIN SERPL BCP-MCNC: 4.4 G/DL (ref 3.5–5)
ALP SERPL-CCNC: 67 U/L (ref 34–104)
ALT SERPL W P-5'-P-CCNC: 16 U/L (ref 7–52)
ANION GAP SERPL CALCULATED.3IONS-SCNC: 8 MMOL/L
AST SERPL W P-5'-P-CCNC: 16 U/L (ref 13–39)
BASOPHILS # BLD AUTO: 0.08 THOUSANDS/ÂΜL (ref 0–0.1)
BASOPHILS NFR BLD AUTO: 1 % (ref 0–1)
BILIRUB SERPL-MCNC: 0.49 MG/DL (ref 0.2–1)
BUN SERPL-MCNC: 20 MG/DL (ref 5–25)
CALCIUM SERPL-MCNC: 9.3 MG/DL (ref 8.4–10.2)
CHLORIDE SERPL-SCNC: 107 MMOL/L (ref 96–108)
CHOLEST SERPL-MCNC: 203 MG/DL
CO2 SERPL-SCNC: 29 MMOL/L (ref 21–32)
CREAT SERPL-MCNC: 0.88 MG/DL (ref 0.6–1.3)
EOSINOPHIL # BLD AUTO: 0.79 THOUSAND/ÂΜL (ref 0–0.61)
EOSINOPHIL NFR BLD AUTO: 13 % (ref 0–6)
ERYTHROCYTE [DISTWIDTH] IN BLOOD BY AUTOMATED COUNT: 15.2 % (ref 11.6–15.1)
EST. AVERAGE GLUCOSE BLD GHB EST-MCNC: 128 MG/DL
GFR SERPL CREATININE-BSD FRML MDRD: 71 ML/MIN/1.73SQ M
GLUCOSE P FAST SERPL-MCNC: 98 MG/DL (ref 65–99)
HBA1C MFR BLD: 6.1 %
HCT VFR BLD AUTO: 44.1 % (ref 34.8–46.1)
HDLC SERPL-MCNC: 63 MG/DL
HGB BLD-MCNC: 14.1 G/DL (ref 11.5–15.4)
IMM GRANULOCYTES # BLD AUTO: 0.01 THOUSAND/UL (ref 0–0.2)
IMM GRANULOCYTES NFR BLD AUTO: 0 % (ref 0–2)
LDLC SERPL CALC-MCNC: 122 MG/DL (ref 0–100)
LYMPHOCYTES # BLD AUTO: 2.02 THOUSANDS/ÂΜL (ref 0.6–4.47)
LYMPHOCYTES NFR BLD AUTO: 33 % (ref 14–44)
MCH RBC QN AUTO: 28 PG (ref 26.8–34.3)
MCHC RBC AUTO-ENTMCNC: 32 G/DL (ref 31.4–37.4)
MCV RBC AUTO: 88 FL (ref 82–98)
MONOCYTES # BLD AUTO: 0.45 THOUSAND/ÂΜL (ref 0.17–1.22)
MONOCYTES NFR BLD AUTO: 7 % (ref 4–12)
NEUTROPHILS # BLD AUTO: 2.73 THOUSANDS/ÂΜL (ref 1.85–7.62)
NEUTS SEG NFR BLD AUTO: 46 % (ref 43–75)
NONHDLC SERPL-MCNC: 140 MG/DL
NRBC BLD AUTO-RTO: 0 /100 WBCS
PLATELET # BLD AUTO: 267 THOUSANDS/UL (ref 149–390)
PMV BLD AUTO: 10.8 FL (ref 8.9–12.7)
POTASSIUM SERPL-SCNC: 4 MMOL/L (ref 3.5–5.3)
PROT SERPL-MCNC: 6.7 G/DL (ref 6.4–8.4)
RBC # BLD AUTO: 5.04 MILLION/UL (ref 3.81–5.12)
SODIUM SERPL-SCNC: 144 MMOL/L (ref 135–147)
TRIGL SERPL-MCNC: 89 MG/DL
TSH SERPL DL<=0.05 MIU/L-ACNC: 1.84 UIU/ML (ref 0.45–4.5)
WBC # BLD AUTO: 6.08 THOUSAND/UL (ref 4.31–10.16)

## 2024-03-05 PROCEDURE — 80061 LIPID PANEL: CPT | Performed by: FAMILY MEDICINE

## 2024-03-05 PROCEDURE — 80053 COMPREHEN METABOLIC PANEL: CPT | Performed by: FAMILY MEDICINE

## 2024-03-05 PROCEDURE — 36415 COLL VENOUS BLD VENIPUNCTURE: CPT | Performed by: FAMILY MEDICINE

## 2024-03-05 PROCEDURE — 83036 HEMOGLOBIN GLYCOSYLATED A1C: CPT | Performed by: FAMILY MEDICINE

## 2024-03-05 PROCEDURE — 84443 ASSAY THYROID STIM HORMONE: CPT | Performed by: FAMILY MEDICINE

## 2024-03-05 PROCEDURE — 85025 COMPLETE CBC W/AUTO DIFF WBC: CPT | Performed by: FAMILY MEDICINE

## 2024-03-05 NOTE — PROGRESS NOTES
HCC coding opportunities          Chart Reviewed number of suggestions sent to Provider: 1   J45.40    Patients Insurance        Commercial Insurance: [employer]

## 2024-04-09 ENCOUNTER — OFFICE VISIT (OUTPATIENT)
Dept: FAMILY MEDICINE CLINIC | Facility: CLINIC | Age: 64
End: 2024-04-09
Payer: COMMERCIAL

## 2024-04-09 VITALS
BODY MASS INDEX: 31.24 KG/M2 | WEIGHT: 183 LBS | HEIGHT: 64 IN | TEMPERATURE: 97.7 F | DIASTOLIC BLOOD PRESSURE: 70 MMHG | HEART RATE: 76 BPM | RESPIRATION RATE: 18 BRPM | SYSTOLIC BLOOD PRESSURE: 120 MMHG

## 2024-04-09 DIAGNOSIS — E78.00 HYPERCHOLESTEROLEMIA: Primary | ICD-10-CM

## 2024-04-09 DIAGNOSIS — R73.03 PREDIABETES: ICD-10-CM

## 2024-04-09 DIAGNOSIS — Z00.00 WELL ADULT EXAM: ICD-10-CM

## 2024-04-09 DIAGNOSIS — J45.40 MODERATE PERSISTENT ASTHMA WITHOUT COMPLICATION: ICD-10-CM

## 2024-04-09 DIAGNOSIS — G47.33 OSA (OBSTRUCTIVE SLEEP APNEA): ICD-10-CM

## 2024-04-09 PROCEDURE — 99214 OFFICE O/P EST MOD 30 MIN: CPT | Performed by: FAMILY MEDICINE

## 2024-04-09 PROCEDURE — 99396 PREV VISIT EST AGE 40-64: CPT | Performed by: FAMILY MEDICINE

## 2024-04-09 NOTE — PROGRESS NOTES
Name: Anni Quezada      : 1960      MRN: [id number]  Encounter Provider: Abel Resendiz MD  Encounter Date: 2024   Encounter department: [facility]    Assessment & Plan     1. Hypercholesterolemia    2. Prediabetes    3. SHANE (obstructive sleep apnea)    4. Moderate persistent asthma without complication    5. Well adult exam      Continue with current medications.  Patient has started to see a nutritionist. Handout provided on glycemic index for common foods. Office visit 1 year with repeat labs.    Allergist referral     She is due for a colonoscpy       BMI Counseling: Body mass index is 31.41 kg/m². The BMI is above normal. Nutrition recommendations include consuming healthier snacks, moderation in carbohydrate intake, reducing intake of saturated fat and trans fat, and reducing intake of cholesterol. Exercise recommendations include exercising 3-5 times per week.        The 10-year ASCVD risk score (Harvinder DEMARCO, et al., 2019) is: 3%    Values used to calculate the score:      Age: 63 years      Sex: Female      Is Non- : No      Diabetic: No      Tobacco smoker: No      Systolic Blood Pressure: 120 mmHg      Is BP treated: No      HDL Cholesterol: 63 mg/dL      Total Cholesterol: 203 mg/dL        Subjective     63 year old female here to review labs/Wellness exam. Medications reviewed. Hospitalizations/surgeries/SH/FH reviewed see note.  2023 mammogram. Colonoscopy 2018. 2021 GYN/pap smear      Hyperlipidemia with high HDL 2024 lipid profile cholesterol 203.  HDL 63.  Triglycerides 89.  . TSH 1.844. LFTs normal     Prediabetes 2024 FBS 98. A1c 6.1.     Prior Life Line screening. Carotid artery screen normal. 4 limb EKG NSR. AAA screen normal. PAD screen normal.       Recent Results (from the past 1344 hour(s))   Lipid panel    Collection Time: 24  7:05 AM   Result Value Ref Range    Cholesterol 203 (H) See Comment mg/dL     Triglycerides 89 See Comment mg/dL    HDL, Direct 63 >=50 mg/dL    LDL Calculated 122 (H) 0 - 100 mg/dL    Non-HDL-Chol (CHOL-HDL) 140 mg/dl   Hemoglobin A1C    Collection Time: 03/05/24  7:05 AM   Result Value Ref Range    Hemoglobin A1C 6.1 (H) Normal 4.0-5.6%; PreDiabetic 5.7-6.4%; Diabetic >=6.5%; Glycemic control for adults with diabetes <7.0% %     mg/dl   Comprehensive metabolic panel    Collection Time: 03/05/24  7:05 AM   Result Value Ref Range    Sodium 144 135 - 147 mmol/L    Potassium 4.0 3.5 - 5.3 mmol/L    Chloride 107 96 - 108 mmol/L    CO2 29 21 - 32 mmol/L    ANION GAP 8 mmol/L    BUN 20 5 - 25 mg/dL    Creatinine 0.88 0.60 - 1.30 mg/dL    Glucose, Fasting 98 65 - 99 mg/dL    Calcium 9.3 8.4 - 10.2 mg/dL    AST 16 13 - 39 U/L    ALT 16 7 - 52 U/L    Alkaline Phosphatase 67 34 - 104 U/L    Total Protein 6.7 6.4 - 8.4 g/dL    Albumin 4.4 3.5 - 5.0 g/dL    Total Bilirubin 0.49 0.20 - 1.00 mg/dL    eGFR 71 ml/min/1.73sq m   CBC and differential    Collection Time: 03/05/24  7:05 AM   Result Value Ref Range    WBC 6.08 4.31 - 10.16 Thousand/uL    RBC 5.04 3.81 - 5.12 Million/uL    Hemoglobin 14.1 11.5 - 15.4 g/dL    Hematocrit 44.1 34.8 - 46.1 %    MCV 88 82 - 98 fL    MCH 28.0 26.8 - 34.3 pg    MCHC 32.0 31.4 - 37.4 g/dL    RDW 15.2 (H) 11.6 - 15.1 %    MPV 10.8 8.9 - 12.7 fL    Platelets 267 149 - 390 Thousands/uL    nRBC 0 /100 WBCs    Segmented % 46 43 - 75 %    Immature Grans % 0 0 - 2 %    Lymphocytes % 33 14 - 44 %    Monocytes % 7 4 - 12 %    Eosinophils Relative 13 (H) 0 - 6 %    Basophils Relative 1 0 - 1 %    Absolute Neutrophils 2.73 1.85 - 7.62 Thousands/µL    Absolute Immature Grans 0.01 0.00 - 0.20 Thousand/uL    Absolute Lymphocytes 2.02 0.60 - 4.47 Thousands/µL    Absolute Monocytes 0.45 0.17 - 1.22 Thousand/µL    Eosinophils Absolute 0.79 (H) 0.00 - 0.61 Thousand/µL    Basophils Absolute 0.08 0.00 - 0.10 Thousands/µL   TSH, 3rd generation with Free T4 reflex    Collection Time:  03/05/24  7:05 AM   Result Value Ref Range    TSH 3RD GENERATON 1.844 0.450 - 4.500 uIU/mL           Review of Systems   Constitutional:  Negative for appetite change and unexpected weight change.   HENT:  Positive for congestion. Negative for ear pain, rhinorrhea, sore throat and trouble swallowing.    Eyes:  Negative for visual disturbance.   Respiratory:  Positive for apnea, cough and shortness of breath. Negative for chest tightness and wheezing.         Asthma on daily Advair 250/50 and Singulair/Allegra. SOB/wheezing w/ certain activities      SHANE wears oral appliance-could not tolerate CPAP.    Cardiovascular:  Negative for chest pain, palpitations and leg swelling.   Gastrointestinal:  Negative for abdominal pain, blood in stool, constipation, diarrhea and nausea.        GERD no longer on Omeprazole    Endocrine: Negative for cold intolerance and heat intolerance.        08/2022 heel study T score 0.951. Vitamin D 22. + FH thyroid disease sister    Genitourinary:  Negative for difficulty urinating and flank pain.        See HPI    Musculoskeletal:  Negative for arthralgias and myalgias.   Skin:  Negative for rash.   Allergic/Immunologic: Positive for environmental allergies.   Neurological:  Negative for dizziness and headaches.   Hematological:  Negative for adenopathy. Does not bruise/bleed easily.   Psychiatric/Behavioral:  Negative for dysphoric mood and sleep disturbance.        Past Medical History:   Diagnosis Date    Asthma     Closed fracture of neck of left radius 9/29/2021    Encounter for surveillance of contraceptives, unspecified 07/30/2013    H/O mammogram     Papanicolaou smear 04/02/2018    NEG    Plantar fasciitis 08/09/2017    Resolved:  November 24, 2017    Post-menopausal      Past Surgical History:   Procedure Laterality Date    CARPAL TUNNEL RELEASE Bilateral 2016    COLONOSCOPY  2012    normal    MAMMO (HISTORICAL) Bilateral 09/27/2018    No evidence of malignancy    [place] COLONOSCOPY  FLX DX W/COLLJ SPEC WHEN PFRMD N/A 2018    Procedure: COLONOSCOPY;  Surgeon: Anthony De Leon MD;  Location: [facility];  Service: Gastroenterology    VAGINAL DELIVERY      x3     Family History   Problem Relation Age of Onset    Osteoporosis Mother     Dementia Mother     Arthritis Mother         Knees    Hearing loss Mother     Asthma Father             No Known Problems Sister     Diabetes Daughter     Polycystic ovary syndrome Daughter     No Known Problems Daughter     Breast cancer Maternal Grandmother 40    Lung cancer Maternal Grandfather 60    No Known Problems Paternal Grandfather     Ulcerative colitis Son     No Known Problems Son     No Known Problems Maternal Aunt     No Known Problems Maternal Aunt     No Known Problems Maternal Aunt     No Known Problems Maternal Aunt     No Known Problems Maternal Aunt     No Known Problems Paternal Aunt     No Known Problems Paternal Aunt     No Known Problems Paternal Aunt     No Known Problems Paternal Aunt     No Known Problems Paternal Aunt     No Known Problems Paternal Aunt     COPD Cousin      Social History     Socioeconomic History    Marital status: /Civil Union     Spouse name: None    Number of children: 3    Years of education: None    Highest education level: None   Occupational History    Occupation: [employer]   Tobacco Use    Smoking status: Never    Smokeless tobacco: Never   Vaping Use    Vaping status: Never Used   Substance and Sexual Activity    Alcohol use: Not Currently     Alcohol/week: 1.0 standard drink of alcohol     Types: 1 Glasses of wine per week    Drug use: Never    Sexual activity: Yes     Partners: Male     Birth control/protection: Post-menopausal   Other Topics Concern    None   Social History Narrative    None     Social Determinants of Health     Financial Resource Strain: Not on file   Food Insecurity: Not on file   Transportation Needs: Not on file   Physical Activity: Not on file   Stress: Not on file    Social Connections: Not on file   Intimate Partner Violence: Not on file   Housing Stability: Not on file     Current Outpatient Medications on File Prior to Visit   Medication Sig    albuterol (Ventolin HFA) 90 mcg/act inhaler 1-2 puffs by mouth every 4 hours as needed for wheezing    benzonatate (TESSALON) 200 MG capsule Take 1 capsule (200 mg total) by mouth 3 (three) times a day as needed for cough    fexofenadine (ALLEGRA) 180 MG tablet Take 1 tablet by mouth as needed    fexofenadine-pseudoephedrine (ALLEGRA-D)  MG per tablet Take 1 tablet by mouth    Fluticasone-Salmeterol (Advair) 250-50 mcg/dose inhaler INHALE 1 PUFF BY MOUTH TWO TIMES DAILY RINSE MOUTH AFTER USE    montelukast (SINGULAIR) 10 mg tablet Take 1 tablet (10 mg total) by mouth daily    olopatadine (PATANOL) 0.1 % ophthalmic solution Administer 1 drop to both eyes 2 (two) times a day    omeprazole (PriLOSEC) 20 mg delayed release capsule TAKE 1 CAPSULE BY MOUTH DAILY BEFORE BREAKFAST.     Allergies   Allergen Reactions    Other      seasonal     Immunization History   Administered Date(s) Administered    COVID-19 MODERNA VACC 0.5 ML IM 03/10/2021, 04/07/2021, 11/03/2021    COVID-19 Moderna Vac BIVALENT 12 Yr+ IM 0.5 ML 11/18/2022    COVID-19 Moderna mRNA Vaccine 12 Yr+ 50 mcg/0.5 mL (Spikevax) 10/08/2023    INFLUENZA 11/06/2015, 11/04/2018, 10/24/2022    Influenza Injectable, MDCK, Preservative Free, Quadrivalent, 0.5 mL 09/23/2020    Influenza, recombinant, quadrivalent,injectable, preservative free 10/10/2019, 11/16/2021, 10/24/2022    Influenza, seasonal, injectable 10/26/2011, 11/06/2015, 10/14/2016, 11/07/2017    Pneumococcal Polysaccharide PPV23 11/16/2021    Tdap 10/11/2007, 10/11/2007, 08/29/2014, 04/20/2023    Tuberculin Skin Test-PPD Intradermal 10/17/2007, 08/10/2012    Zoster Vaccine Recombinant 12/04/2020, 02/08/2021       Objective     /70 (BP Location: Left arm, Patient Position: Sitting, Cuff Size: Large)   Pulse 76  "  Temp 97.7 °F (36.5 °C)   Resp 18   Ht 5' 4\" (1.626 m)   Wt 83 kg (183 lb)   LMP  (LMP Unknown)   BMI 31.41 kg/m²      BP Readings from Last 3 Encounters:   04/09/24 120/70   12/18/23 105/55   09/19/23 116/79        Wt Readings from Last 3 Encounters:   04/09/24 83 kg (183 lb)   12/05/23 83.9 kg (185 lb)   08/24/23 83.9 kg (185 lb)        Physical Exam  Abel Resendiz MD    "

## 2024-05-24 DIAGNOSIS — J45.40 MODERATE PERSISTENT ASTHMA WITHOUT COMPLICATION: ICD-10-CM

## 2024-05-24 RX ORDER — MONTELUKAST SODIUM 10 MG/1
10 TABLET ORAL DAILY
Qty: 90 TABLET | Refills: 1 | Status: SHIPPED | OUTPATIENT
Start: 2024-05-24

## 2024-05-29 ENCOUNTER — APPOINTMENT (OUTPATIENT)
Dept: RADIOLOGY | Age: 64
End: 2024-05-29
Payer: COMMERCIAL

## 2024-05-29 DIAGNOSIS — J45.41 MODERATE PERSISTENT ASTHMA WITH EXACERBATION: ICD-10-CM

## 2024-05-29 PROCEDURE — 71046 X-RAY EXAM CHEST 2 VIEWS: CPT

## 2024-05-31 DIAGNOSIS — J45.40 MODERATE PERSISTENT ASTHMA WITHOUT COMPLICATION: ICD-10-CM

## 2024-05-31 RX ORDER — ALBUTEROL SULFATE 90 UG/1
2 AEROSOL, METERED RESPIRATORY (INHALATION) EVERY 6 HOURS PRN
Qty: 18 G | Refills: 3 | Status: SHIPPED | OUTPATIENT
Start: 2024-05-31

## 2024-05-31 NOTE — TELEPHONE ENCOUNTER
Reason for call:   [x] Refill   [] Prior Auth  [x] Other: please advise pt needs asap she's leaving for a trip tomorrow.     Office:   [x] PCP/Provider - Martín EVANS   [] Specialty/Provider -     Medication: Ventolin     Dose/Frequency: 90mcg/act - 1-2puffs every 4 hours PRN     Quantity: 18g    Pharmacy: [facility] #[id number]     Does the patient have enough for 3 days?   [] Yes   [x] No - Send as HP to POD

## 2024-08-06 ENCOUNTER — OFFICE VISIT (OUTPATIENT)
Dept: FAMILY MEDICINE CLINIC | Facility: CLINIC | Age: 64
End: 2024-08-06
Payer: COMMERCIAL

## 2024-08-06 VITALS
SYSTOLIC BLOOD PRESSURE: 118 MMHG | DIASTOLIC BLOOD PRESSURE: 74 MMHG | HEIGHT: 64 IN | RESPIRATION RATE: 18 BRPM | HEART RATE: 85 BPM | OXYGEN SATURATION: 97 % | BODY MASS INDEX: 32.01 KG/M2 | WEIGHT: 187.5 LBS | TEMPERATURE: 98.5 F

## 2024-08-06 DIAGNOSIS — J01.10 ACUTE NON-RECURRENT FRONTAL SINUSITIS: Primary | ICD-10-CM

## 2024-08-06 PROCEDURE — 99213 OFFICE O/P EST LOW 20 MIN: CPT | Performed by: FAMILY MEDICINE

## 2024-08-06 RX ORDER — FLUTICASONE PROPIONATE 50 MCG
1 SPRAY, SUSPENSION (ML) NASAL DAILY
Qty: 9.9 ML | Refills: 0 | Status: SHIPPED | OUTPATIENT
Start: 2024-08-06

## 2024-08-06 RX ORDER — AMOXICILLIN AND CLAVULANATE POTASSIUM 875; 125 MG/1; MG/1
1 TABLET, FILM COATED ORAL EVERY 12 HOURS SCHEDULED
Qty: 14 TABLET | Refills: 0 | Status: SHIPPED | OUTPATIENT
Start: 2024-08-06 | End: 2024-08-13

## 2024-08-06 RX ORDER — FLUTICASONE FUROATE, UMECLIDINIUM BROMIDE AND VILANTEROL TRIFENATATE 200; 62.5; 25 UG/1; UG/1; UG/1
1 POWDER RESPIRATORY (INHALATION) DAILY
COMMUNITY
Start: 2024-07-13

## 2024-08-06 NOTE — PROGRESS NOTES
"Ambulatory Visit  Name: Anni Quezada      : 1960      MRN: [id number]  Encounter Provider: Xuan Triplett MD  Encounter Date: 2024   Encounter department: [facility]    Assessment & Plan   1. Acute non-recurrent frontal sinusitis  -     amoxicillin-clavulanate (AUGMENTIN) 875-125 mg per tablet; Take 1 tablet by mouth every 12 (twelve) hours for 7 days  -     fluticasone (FLONASE) 50 mcg/act nasal spray; 1 spray into each nostril daily  Patient reports no improvement in symptoms over the past week.  Start Augmentin and Flonase.  Also recommend NeilMed sinus rinse.  Follow-up if no improvement     History of Present Illness     Patient presents with:  Sinus Problem: Patient complained of stuffy nose, yellow mucus x started last Thursday, patient went for allergy test Wednesday and symptoms started Thursday    Patient presents today with sinus symptoms.    Sinus Problem  This is a new problem. The current episode started in the past 7 days. The problem is unchanged. There has been no fever. Associated symptoms include congestion and sinus pressure. Pertinent negatives include no headaches or sore throat. Past treatments include saline nose sprays and sitting up. The treatment provided no relief.       Review of Systems   Constitutional:  Positive for fatigue.   HENT:  Positive for congestion, rhinorrhea and sinus pressure. Negative for sore throat.    Neurological:  Negative for headaches.       Objective     /74 (BP Location: Left arm, Patient Position: Sitting, Cuff Size: Standard)   Pulse 85   Temp 98.5 °F (36.9 °C) (Temporal)   Resp 18   Ht 5' 4\" (1.626 m)   Wt 85 kg (187 lb 8 oz)   LMP  (LMP Unknown)   SpO2 97%   BMI 32.18 kg/m²     Physical Exam  HENT:      Head: Normocephalic and atraumatic.      Nose: Congestion and rhinorrhea present.   Cardiovascular:      Rate and Rhythm: Normal rate and regular rhythm.   Pulmonary:      Effort: Pulmonary effort is " normal.      Breath sounds: Normal breath sounds.       Administrative Statements

## 2024-08-07 ENCOUNTER — PREP FOR PROCEDURE (OUTPATIENT)
Age: 64
End: 2024-08-07

## 2024-08-07 ENCOUNTER — TELEPHONE (OUTPATIENT)
Age: 64
End: 2024-08-07

## 2024-08-07 DIAGNOSIS — Z86.010 HISTORY OF COLON POLYPS: Primary | ICD-10-CM

## 2024-08-07 NOTE — TELEPHONE ENCOUNTER
24  Screened by: Fela Peck    Referring Provider Dr De Leon    Pre- Screenin'4 187 BMI 32.18    Has patient been referred for a routine screening Colonoscopy? yes  Is the patient between 45-75 years old? yes      Previous Colonoscopy yes   If yes:    Date: 2018    Facility:     Reason:         Does the patient want to see a Gastroenterologist prior to their procedure OR are they having any GI symptoms? no    Has the patient been hospitalized or had abdominal surgery in the past 6 months? no    Does the patient use supplemental oxygen? no    Does the patient take Coumadin, Lovenox, Plavix, Elliquis, Xarelto, or other blood thinning medication? no    Has the patient had a stroke, cardiac event, or stent placed in the past year? no      If patient is between 45yrs - 49yrs, please advise patient that we will have to confirm benefits & coverage with their insurance company for a routine screening colonoscopy.

## 2024-08-07 NOTE — TELEPHONE ENCOUNTER
Scheduled date of colonoscopy (as of today):9/3/24    Physician performing colonoscopy:Dr De Leon    Location of colonoscopy:[facility]    Bowel prep reviewed with patient:miralax/dulcolax    Instructions reviewed with patient by:prep instructions sent via [employer]    Clearances: N/A

## 2024-08-07 NOTE — LETTER
Attached are your prep instructions for your upcoming procedure on 9/3/24. If you have any questions or concerns please contact us at [phone].    Thank you,     [facility]    COLONOSCOPY  MIRALAX/Dulcolax Bowel Preparation Instructions    The OR/GI Lab will contact you the evening prior to your procedure with your exact arrival time.    Our practice requires a 1 week notice for any cancellations or rescheduling. We kindly ask that you immediately notify us of any changes including any new medications that are prescribed. Thank you for your cooperation.     WEEK BEFORE YOUR PROCEDURE:  Stop taking Iron tablets.  5 days prior, AVOID vegetables and fruits with skins or seeds, nuts, corn, popcorn and whole grain breads.   Purchase: One (1) 238-gram container of Miralax (polyethylene glycol 3350), four (4) 5 mg Dulcolax (bisacodyl) tablets, and one (1) 64-ounce bottle of Gatorade (sports drink) - no red, orange, or purple. These may be purchased at any pharmacy without a prescription. Generic products are permissible.   Arrange responsible transportation for day of the procedure.     DAY BEFORE THE PROCEDURE:   CLEAR liquids only for entire day prior. Nothing red, orange or purple.    You MAY have:                                                               Soda  Water  Broth Gatorade  Jello  Popsicles Coffee/tea without milk/creamer     YOU MAY NOT HAVE:  Solid foods   Milk and milk products    Juice with pulp    BOWEL PREPARATION:  Includes: One (1) 238-gram container of Miralax (polyethylene glycol 3350), four (4) 5 mg Dulcolax (bisacodyl) tablets, and one (1) 64-ounce bottle of Gatorade (sports drink).  Preparation may be refrigerated.  Entire bowel prep should be completed.     Afternoon before the procedure (2:00 pm - 5:00 pm):    Take two (2) 5 mg Dulcolax laxative tablets.     Evening before the procedure (6:00 pm):  Mix entire container of  Miralax with one (1) 64-ounce bottle of Gatorade and shake until all medication is dissolved.   Begin drinking solution. Drink an eight (8) ounce cup every 10-15 minutes until you have consumed half (32 ounces) of the solution.  Refrigerate remaining solution.    Night before the procedure (8:00 pm):  Take two (2) 5 mg Dulcolax laxative tablets.     Beginning 5 hours before your procedure:  Drink the remaining amount of prepared solution (32 ounces).  Drink an eight (8) ounce cup every 10-15 minutes until you have consumed the remaining solution.     Bowel prep should be completed 4 hours prior to procedure time.    NOTHING TO EAT OR DRINK AFTER MIDNIGHT- EXCEPT FOR YOUR PREP    DAY OF THE PROCEDURE:  You may brush your teeth.  Leave all jewelry at home.  Please arrive for your procedure as indicated by the OR / GI Lab / Endoscopy Unit. The hospital will contact you the day before with your exact arrival time.   Make sure you have arranged ahead of time for a responsible adult (18 or older) to accompany and drive you home after the procedure.  Please discuss any transportation concerns with our staff prior to your procedure.    The effects of the anesthesia can persist for 24 hours.  After receiving the sedation, you must exercise caution before engaging in any activity that could harm yourself and others (such as driving a car).  Do not make any important decisions or do not drink any alcoholic beverages during this time period.  After your procedure, you may have anything you'd like to eat or drink.  You will probably want to start with something light.  Please include plenty of fluids.  Avoid items that cause gas such as sodas and salads.    SPECIAL INSTRUCTIONS:    For patients currently taking blood thinners and/or antiplatelet therapy our office will contact the prescribing provider.  Our office will contact you with any required changes to your medication regimen.     Blood thinner (i.e. - Coumadin, Pradaxa,  Lovenox, Xarelto, Eliquis)  ?  Continue (Do Not Stop)  ? Stop______________for_____________days prior to the procedure.    Antiplatelet (i.e. - Plavix, Aggrenox, Effient, Brilinta)  ?  Continue (Do Not Stop)  ? Stop______________for_____________days prior to the procedure.       Diabetes:   If you are Diabetic, please see separate Diabetic Instruction Sheet.          Prescribed medications:  Do not stop your aspirin, or any of your other medications (unless instructed otherwise).    Take the rest of your prescribed medications with small sips of water at least 2 hours prior to your procedure.      For any questions or concerns related to your bowel preparation or pre-procedure instructions, please contact our office at [phone].  Thank you for choosing [facility]!Medicine Instructions for Adults with Diabetes who Need a Bowel Prep       Follow these instructions when a BOWEL PREP is required for your procedure or surgery!    NOTE:   GLP-1 Agonists taken weekly: do not take in the 7 days before your procedure   SGLT-2 Inhibitors: do not take in the 4 days before your procedure     On the Day Before Surgery/Procedure  If you are having a procedure (e.g. Colonoscopy) or surgery that requires a bowel prep and you may have at least a clear liquid diet, follow the directions below based on the type of medicine you take for your diabetes.     Type of Medicine You Take Examples What to do   Pre-Mixed Insulin - Intermediate Acting Humalog® 75/25, Humulin® 70/30, Novolog® 70/30, Regular Insulin Take ½ your regular dose the evening before your procedure   Rapid/Fast Acting Insulin Humalog® U200, NovoLog®, Apidra®, Fiasp® Take ½ your regular dose the evening before your procedure.   Long-Acting Insulin Lantus®, Levemir®, Tresiba®, Toujeo®, Basaglar® Take your FULL regular dose the day before procedure   Oral Sulfonylurea Glipizide/Glimepiride/Glucotrol® Take ½ your regular dose the evening before your  procedure   Other Oral Diabetes Medicines Metformin®, Glucophage®, Glucophage XR®, Riomet®, Glumetza®), Actose®, Avandia®, Glyset®, Prandin® Take your regular dose with dinner in the evening before your procedure   GLP-1 Agonists AdlyxinÒ, ByettaÒ, BydureonÒ, OzempicÒ, SoliquaÒ, TanzeumÒ, TrulicityÒ, VictozaÒ, Saxenda®, Rybelsus® If taken daily, take as normal    If taken weekly, do not take this medicine for 7 days before your procedure including the day of the procedure (resume taking after the procedure)   SGLT-2 Inhibitors Jardiance®, Invokana®, Farxiga®,   Steglatro®, Brenzavvy®, Qtern®, Segluromet®, Glyxambi®, Synjardy®, Synjardy XR®, Invokamet®, Invokamet XR®, Trijary XR®, Xigduo XR®, Steglujan® Do not take for 4 days before your procedure including the day of the procedure (resume taking after the procedure)                More information continued on back                    Medicine Instructions for Adults with Diabetes who Need a Bowel Prep  Page 2      On the Day of Surgery/Procedure  Follow the directions below based on the type of medicine you take for your diabetes.     Type of Medicine You Take Examples What to do   Long-Acting Insulin Lantus®, Levemir®, Tresiba®, Toujeo®, Basaglar®, Semglee®   If you usually take your Long-Acting Insulin in the morning, take the full dose as scheduled.   GLP-1 Agonists AdlyxinÒ, ByettaÒ, BydureonÒ, OzempicÒ, SoliquaÒ, TanzeumÒ, TrulicityÒ, VictozaÒ, Saxenda®, Rybelsus® Do NOT take this medicine on the day of your procedure (resume taking after the procedure)       On the Day of Surgery/Procedure (continued)  Except for the morning Long-Acting Insulin, DO NOT take ANY diabetic medicine on the day of your procedure unless you were instructed by the doctor who manages your diabetes medicines.    Continue to check your blood sugars.  If you have an insulin pump, ask your endocrinologist for instructions at least 3 days before your procedure. NOTE: If you are not able to  ask your endocrinologist in advance, on the day of the procedure set your insulin pump to your basal rate only. Bring your insulin pump supplies to the hospital.     If you have any questions about taking your diabetes medicines prior to your procedure, please contact the doctor who manages your diabetes medicines.

## 2024-08-20 ENCOUNTER — ANESTHESIA EVENT (OUTPATIENT)
Dept: ANESTHESIOLOGY | Facility: HOSPITAL | Age: 64
End: 2024-08-20

## 2024-08-20 ENCOUNTER — ANESTHESIA (OUTPATIENT)
Dept: ANESTHESIOLOGY | Facility: HOSPITAL | Age: 64
End: 2024-08-20

## 2024-08-24 DIAGNOSIS — J45.40 MODERATE PERSISTENT ASTHMA WITHOUT COMPLICATION: ICD-10-CM

## 2024-08-25 RX ORDER — ALBUTEROL SULFATE 90 UG/1
AEROSOL, METERED RESPIRATORY (INHALATION)
Qty: 18 G | Refills: 3 | Status: SHIPPED | OUTPATIENT
Start: 2024-08-25

## 2024-08-27 ENCOUNTER — ANNUAL EXAM (OUTPATIENT)
Dept: GYNECOLOGY | Facility: CLINIC | Age: 64
End: 2024-08-27
Payer: COMMERCIAL

## 2024-08-27 VITALS
DIASTOLIC BLOOD PRESSURE: 82 MMHG | BODY MASS INDEX: 31.92 KG/M2 | SYSTOLIC BLOOD PRESSURE: 124 MMHG | HEIGHT: 64 IN | WEIGHT: 187 LBS

## 2024-08-27 DIAGNOSIS — Z01.419 ROUTINE GYNECOLOGICAL EXAMINATION: ICD-10-CM

## 2024-08-27 DIAGNOSIS — Z12.4 CERVICAL CANCER SCREENING: ICD-10-CM

## 2024-08-27 DIAGNOSIS — Z12.39 ENCOUNTER FOR SCREENING BREAST EXAMINATION: ICD-10-CM

## 2024-08-27 DIAGNOSIS — Z12.31 SCREENING MAMMOGRAM FOR BREAST CANCER: ICD-10-CM

## 2024-08-27 DIAGNOSIS — Z01.419 ENCOUNTER FOR WELL WOMAN EXAM: Primary | ICD-10-CM

## 2024-08-27 DIAGNOSIS — Z11.51 SCREENING FOR HPV (HUMAN PAPILLOMAVIRUS): ICD-10-CM

## 2024-08-27 PROCEDURE — G0145 SCR C/V CYTO,THINLAYER,RESCR: HCPCS | Performed by: PHYSICIAN ASSISTANT

## 2024-08-27 PROCEDURE — G0476 HPV COMBO ASSAY CA SCREEN: HCPCS | Performed by: PHYSICIAN ASSISTANT

## 2024-08-27 PROCEDURE — S0612 ANNUAL GYNECOLOGICAL EXAMINA: HCPCS | Performed by: PHYSICIAN ASSISTANT

## 2024-08-27 NOTE — PROGRESS NOTES
Assessment/Plan:      Diagnoses and all orders for this visit:    Encounter for well woman exam    Encounter for screening breast examination    Cervical cancer screening    Screening for HPV (human papillomavirus)  -     Liquid-based pap, screening    Routine gynecological examination  -     Liquid-based pap, screening    Screening mammogram for breast cancer  -     Mammo screening bilateral w 3d & cad; Future          Subjective:     Patient ID: Anni Quezada is a 63 y.o. female.    Pt presents for her annual exam today--  She has no complaints  She has no bleeding or pelvic pain  Bowel and bladder are regular  Colonoscopy--18  No breast concerns today  Last mammo--23    pap today.    Rx mamm  Daily ca, d        Review of Systems   Constitutional:  Negative for chills, fever and unexpected weight change.   HENT:  Negative for ear pain and sore throat.    Eyes:  Negative for pain and visual disturbance.   Respiratory:  Negative for cough and shortness of breath.    Cardiovascular:  Negative for chest pain and palpitations.   Gastrointestinal:  Negative for abdominal pain, blood in stool, constipation, diarrhea and vomiting.   Genitourinary: Negative.  Negative for dysuria and hematuria.   Musculoskeletal:  Negative for arthralgias and back pain.   Skin:  Negative for color change and rash.   Neurological:  Negative for seizures and syncope.   All other systems reviewed and are negative.        Objective:     Physical Exam  Vitals and nursing note reviewed.   Constitutional:       Appearance: Normal appearance. She is well-developed.   HENT:      Head: Normocephalic and atraumatic.   Chest:   Breasts:     Right: No inverted nipple, mass, nipple discharge or skin change.      Left: No inverted nipple, mass, nipple discharge or skin change.   Abdominal:      Palpations: Abdomen is soft.   Genitourinary:     Exam position: Supine.      Labia:         Right: No rash, tenderness or lesion.         Left: No rash,  tenderness or lesion.       Vagina: Normal.      Cervix: No cervical motion tenderness, discharge or friability.      Adnexa:         Right: No mass, tenderness or fullness.          Left: No mass, tenderness or fullness.     Musculoskeletal:      Cervical back: Normal range of motion.   Lymphadenopathy:      Lower Body: No right inguinal adenopathy. No left inguinal adenopathy.   Neurological:      Mental Status: She is alert.

## 2024-09-02 DIAGNOSIS — J01.10 ACUTE NON-RECURRENT FRONTAL SINUSITIS: ICD-10-CM

## 2024-09-03 ENCOUNTER — HOSPITAL ENCOUNTER (OUTPATIENT)
Dept: GASTROENTEROLOGY | Facility: AMBULARY SURGERY CENTER | Age: 64
Setting detail: OUTPATIENT SURGERY
Discharge: HOME | End: 2024-09-03
Attending: INTERNAL MEDICINE
Payer: COMMERCIAL

## 2024-09-03 ENCOUNTER — ANESTHESIA (OUTPATIENT)
Dept: GASTROENTEROLOGY | Facility: AMBULARY SURGERY CENTER | Age: 64
End: 2024-09-03

## 2024-09-03 ENCOUNTER — ANESTHESIA EVENT (OUTPATIENT)
Dept: GASTROENTEROLOGY | Facility: AMBULARY SURGERY CENTER | Age: 64
End: 2024-09-03

## 2024-09-03 VITALS
TEMPERATURE: 97.6 F | HEART RATE: 64 BPM | HEIGHT: 64 IN | SYSTOLIC BLOOD PRESSURE: 111 MMHG | WEIGHT: 179 LBS | OXYGEN SATURATION: 99 % | BODY MASS INDEX: 30.56 KG/M2 | DIASTOLIC BLOOD PRESSURE: 57 MMHG | RESPIRATION RATE: 15 BRPM

## 2024-09-03 DIAGNOSIS — Z86.010 HISTORY OF COLON POLYPS: ICD-10-CM

## 2024-09-03 PROCEDURE — G0121 COLON CA SCRN NOT HI RSK IND: HCPCS | Performed by: INTERNAL MEDICINE

## 2024-09-03 RX ORDER — SODIUM CHLORIDE, SODIUM LACTATE, POTASSIUM CHLORIDE, CALCIUM CHLORIDE 600; 310; 30; 20 MG/100ML; MG/100ML; MG/100ML; MG/100ML
INJECTION, SOLUTION INTRAVENOUS CONTINUOUS PRN
Status: DISCONTINUED | OUTPATIENT
Start: 2024-09-03 | End: 2024-09-03

## 2024-09-03 RX ORDER — FLUTICASONE PROPIONATE 50 MCG
SPRAY, SUSPENSION (ML) NASAL
Qty: 16 ML | Refills: 5 | Status: SHIPPED | OUTPATIENT
Start: 2024-09-03

## 2024-09-03 RX ORDER — LEVOCETIRIZINE DIHYDROCHLORIDE 5 MG/1
5 TABLET, FILM COATED ORAL EVERY EVENING
COMMUNITY

## 2024-09-03 RX ORDER — PROPOFOL 10 MG/ML
INJECTION, EMULSION INTRAVENOUS AS NEEDED
Status: DISCONTINUED | OUTPATIENT
Start: 2024-09-03 | End: 2024-09-03

## 2024-09-03 RX ADMIN — PROPOFOL 150 MG: 10 INJECTION, EMULSION INTRAVENOUS at 11:48

## 2024-09-03 RX ADMIN — PROPOFOL 50 MG: 10 INJECTION, EMULSION INTRAVENOUS at 12:04

## 2024-09-03 RX ADMIN — PROPOFOL 50 MG: 10 INJECTION, EMULSION INTRAVENOUS at 11:57

## 2024-09-03 RX ADMIN — PROPOFOL 50 MG: 10 INJECTION, EMULSION INTRAVENOUS at 12:01

## 2024-09-03 RX ADMIN — SODIUM CHLORIDE, SODIUM LACTATE, POTASSIUM CHLORIDE, AND CALCIUM CHLORIDE: .6; .31; .03; .02 INJECTION, SOLUTION INTRAVENOUS at 11:37

## 2024-09-03 NOTE — H&P
History and Physical - [facility]  Anni Quezada 63 y.o. female MRN: [id number]        HPI: 63-year-old female was referred for screening colonoscopy regular bowel movements.    Historical Information   Past Medical History:   Diagnosis Date    Asthma     Closed fracture of neck of left radius 2021    Encounter for surveillance of contraceptives, unspecified 2013    H/O mammogram     Papanicolaou smear 2018    NEG    Plantar fasciitis 2017    Resolved:  2017    Post-menopausal      Past Surgical History:   Procedure Laterality Date    CARPAL TUNNEL RELEASE Bilateral 2016    COLONOSCOPY  2012    normal    MAMMO (HISTORICAL) Bilateral 2018    No evidence of malignancy    AL COLONOSCOPY FLX DX W/COLLJ SPEC WHEN PFRMD N/A 2018    Procedure: COLONOSCOPY;  Surgeon: Anthony De Leon MD;  [facility];  Service: Gastroenterology    VAGINAL DELIVERY      x3     Social History   Social History     Substance and Sexual Activity   Alcohol Use Yes    Alcohol/week: 1.0 standard drink of alcohol    Types: 1 Glasses of wine per week     Social History     Substance and Sexual Activity   Drug Use Never     Social History     Tobacco Use   Smoking Status Never   Smokeless Tobacco Never     Family History   Problem Relation Age of Onset    Osteoporosis Mother     Dementia Mother     Arthritis Mother         Knees    Hearing loss Mother     Asthma Father             No Known Problems Sister     Diabetes Daughter     Polycystic ovary syndrome Daughter     No Known Problems Daughter     Breast cancer Maternal Grandmother 40    Lung cancer Maternal Grandfather 60    No Known Problems Paternal Grandfather     Ulcerative colitis Son     No Known Problems Son     No Known Problems Maternal Aunt     No Known Problems Maternal Aunt     No Known Problems Maternal Aunt     No Known Problems Maternal Aunt     No Known Problems Maternal Aunt     No Known  "Problems Paternal Aunt     No Known Problems Paternal Aunt     No Known Problems Paternal Aunt     No Known Problems Paternal Aunt     No Known Problems Paternal Aunt     No Known Problems Paternal Aunt     COPD Cousin        Meds/Allergies     Not in a hospital admission.    Allergies   Allergen Reactions    Dog Epithelium Sneezing    Dust Mite Extract Sneezing    Other      seasonal    Tree Extract Sneezing       Objective     Blood pressure 107/58, pulse 72, temperature (!) 96.5 °F (35.8 °C), temperature source Temporal, resp. rate 16, height 5' 4\" (1.626 m), weight 81.2 kg (179 lb), SpO2 98%, not currently breastfeeding.    Physical Exam:    Chest- CTA  Heart- RRR  Abdomen- NT/ND  Extremities- No edema    ASSESSMENT:     Screening for colon cancer    PLAN:    Colonoscopy              "

## 2024-09-03 NOTE — ANESTHESIA POSTPROCEDURE EVALUATION
Post-Op Assessment Note    CV Status:  Stable  Pain Score: 0    Pain management: adequate       Mental Status:  Awake and alert   Hydration Status:  Stable   PONV Controlled:  Controlled   Airway Patency:  Patent     Post Op Vitals Reviewed: Yes      Staff: CRNA, with CRNAs               BP   106/59   Temp 98   ` 78   Resp 12   SpO2 100

## 2024-09-03 NOTE — ANESTHESIA PREPROCEDURE EVALUATION
Procedure:  COLONOSCOPY    Relevant Problems   CARDIO   (+) Hypercholesterolemia      PULMONARY   (+) Moderate persistent asthma without complication   (+) SHANE (obstructive sleep apnea)        Physical Exam    Airway    Mallampati score: I  TM Distance: >3 FB  Neck ROM: full     Dental       Cardiovascular  Cardiovascular exam normal    Pulmonary  Pulmonary exam normal     Other Findings  post-pubertal.      Anesthesia Plan  ASA Score- 2     Anesthesia Type- IV sedation with anesthesia with ASA Monitors.         Additional Monitors:     Airway Plan:            Plan Factors-Exercise tolerance (METS): >4 METS.    Chart reviewed. EKG reviewed.  Existing labs reviewed. Patient summary reviewed.    Patient is not a current smoker.  Patient did not smoke on day of surgery.    Obstructive sleep apnea risk education given perioperatively.        Induction-     Postoperative Plan-     Perioperative Resuscitation Plan - Level 1 - Full Code.       Informed Consent- Anesthetic plan and risks discussed with patient and spouse.  I personally reviewed this patient with the CRNA. Discussed and agreed on the Anesthesia Plan with the CRNA..

## 2024-09-03 NOTE — ANESTHESIA POSTPROCEDURE EVALUATION
Post-Op Assessment Note    CV Status:  Stable  Pain Score: 0    Pain management: adequate       Mental Status:  Awake and alert   Hydration Status:  Stable   PONV Controlled:  Controlled   Airway Patency:  Patent     Post Op Vitals Reviewed: Yes    No anethesia notable event occurred.    Staff: CRNA, with CRNAs               BP   106/59   Temp 98   ` 78   Resp 12   SpO2 100

## 2024-09-04 LAB
LAB AP GYN PRIMARY INTERPRETATION: NORMAL
Lab: NORMAL

## 2024-10-03 DIAGNOSIS — J01.10 ACUTE NON-RECURRENT FRONTAL SINUSITIS: ICD-10-CM

## 2024-10-03 RX ORDER — FLUTICASONE PROPIONATE 50 MCG
SPRAY, SUSPENSION (ML) NASAL
Qty: 48 ML | Refills: 2 | Status: SHIPPED | OUTPATIENT
Start: 2024-10-03

## 2024-11-26 DIAGNOSIS — J45.40 MODERATE PERSISTENT ASTHMA WITHOUT COMPLICATION: ICD-10-CM

## 2024-11-27 RX ORDER — MONTELUKAST SODIUM 10 MG/1
10 TABLET ORAL DAILY
Qty: 90 TABLET | Refills: 1 | Status: SHIPPED | OUTPATIENT
Start: 2024-11-27

## 2024-12-20 ENCOUNTER — HOSPITAL ENCOUNTER (OUTPATIENT)
Dept: RADIOLOGY | Age: 64
Discharge: HOME | End: 2024-12-20
Payer: COMMERCIAL

## 2024-12-20 VITALS — BODY MASS INDEX: 31.58 KG/M2 | HEIGHT: 64 IN | WEIGHT: 185 LBS

## 2024-12-20 DIAGNOSIS — Z12.31 SCREENING MAMMOGRAM FOR BREAST CANCER: ICD-10-CM

## 2024-12-20 PROCEDURE — 77067 SCR MAMMO BI INCL CAD: CPT

## 2024-12-20 PROCEDURE — 77063 BREAST TOMOSYNTHESIS BI: CPT

## 2024-12-26 ENCOUNTER — HOSPITAL ENCOUNTER (EMERGENCY)
Facility: HOSPITAL | Age: 64
Discharge: HOME | End: 2024-12-26
Attending: EMERGENCY MEDICINE
Payer: COMMERCIAL

## 2024-12-26 ENCOUNTER — APPOINTMENT (EMERGENCY)
Dept: RADIOLOGY | Facility: HOSPITAL | Age: 64
End: 2024-12-26
Payer: COMMERCIAL

## 2024-12-26 ENCOUNTER — NURSE TRIAGE (OUTPATIENT)
Age: 64
End: 2024-12-26

## 2024-12-26 ENCOUNTER — APPOINTMENT (EMERGENCY)
Dept: VASCULAR ULTRASOUND | Facility: HOSPITAL | Age: 64
End: 2024-12-26
Payer: COMMERCIAL

## 2024-12-26 VITALS
TEMPERATURE: 98 F | HEART RATE: 75 BPM | RESPIRATION RATE: 16 BRPM | SYSTOLIC BLOOD PRESSURE: 121 MMHG | DIASTOLIC BLOOD PRESSURE: 63 MMHG | OXYGEN SATURATION: 97 %

## 2024-12-26 DIAGNOSIS — M79.661 RIGHT CALF PAIN: Primary | ICD-10-CM

## 2024-12-26 DIAGNOSIS — R07.89 ATYPICAL CHEST PAIN: ICD-10-CM

## 2024-12-26 LAB
ALBUMIN SERPL BCG-MCNC: 4.2 G/DL (ref 3.5–5)
ALP SERPL-CCNC: 77 U/L (ref 34–104)
ALT SERPL W P-5'-P-CCNC: 18 U/L (ref 7–52)
ANION GAP SERPL CALCULATED.3IONS-SCNC: 7 MMOL/L (ref 4–13)
APTT PPP: 26 SECONDS (ref 23–34)
AST SERPL W P-5'-P-CCNC: 16 U/L (ref 13–39)
ATRIAL RATE: 64 BPM
BASOPHILS # BLD AUTO: 0.04 THOUSANDS/ÂΜL (ref 0–0.1)
BASOPHILS NFR BLD AUTO: 1 % (ref 0–1)
BILIRUB SERPL-MCNC: 0.37 MG/DL (ref 0.2–1)
BNP SERPL-MCNC: 18 PG/ML (ref 0–100)
BUN SERPL-MCNC: 13 MG/DL (ref 5–25)
CALCIUM SERPL-MCNC: 9.3 MG/DL (ref 8.4–10.2)
CARDIAC TROPONIN I PNL SERPL HS: <2 NG/L (ref ?–50)
CHLORIDE SERPL-SCNC: 107 MMOL/L (ref 96–108)
CO2 SERPL-SCNC: 26 MMOL/L (ref 21–32)
CREAT SERPL-MCNC: 0.78 MG/DL (ref 0.6–1.3)
D DIMER PPP FEU-MCNC: 0.65 UG/ML FEU
EOSINOPHIL # BLD AUTO: 0.73 THOUSAND/ÂΜL (ref 0–0.61)
EOSINOPHIL NFR BLD AUTO: 10 % (ref 0–6)
ERYTHROCYTE [DISTWIDTH] IN BLOOD BY AUTOMATED COUNT: 14.9 % (ref 11.6–15.1)
GFR SERPL CREATININE-BSD FRML MDRD: 81 ML/MIN/1.73SQ M
GLUCOSE SERPL-MCNC: 103 MG/DL (ref 65–140)
HCT VFR BLD AUTO: 40.6 % (ref 34.8–46.1)
HGB BLD-MCNC: 13.1 G/DL (ref 11.5–15.4)
IMM GRANULOCYTES # BLD AUTO: 0.02 THOUSAND/UL (ref 0–0.2)
IMM GRANULOCYTES NFR BLD AUTO: 0 % (ref 0–2)
INR PPP: 0.9 (ref 0.85–1.19)
LYMPHOCYTES # BLD AUTO: 1.19 THOUSANDS/ÂΜL (ref 0.6–4.47)
LYMPHOCYTES NFR BLD AUTO: 16 % (ref 14–44)
MCH RBC QN AUTO: 28.2 PG (ref 26.8–34.3)
MCHC RBC AUTO-ENTMCNC: 32.3 G/DL (ref 31.4–37.4)
MCV RBC AUTO: 87 FL (ref 82–98)
MONOCYTES # BLD AUTO: 0.44 THOUSAND/ÂΜL (ref 0.17–1.22)
MONOCYTES NFR BLD AUTO: 6 % (ref 4–12)
NEUTROPHILS # BLD AUTO: 4.89 THOUSANDS/ÂΜL (ref 1.85–7.62)
NEUTS SEG NFR BLD AUTO: 67 % (ref 43–75)
NRBC BLD AUTO-RTO: 0 /100 WBCS
P AXIS: 45 DEGREES
PLATELET # BLD AUTO: 276 THOUSANDS/UL (ref 149–390)
PMV BLD AUTO: 9.6 FL (ref 8.9–12.7)
POTASSIUM SERPL-SCNC: 4.2 MMOL/L (ref 3.5–5.3)
PR INTERVAL: 162 MS
PROT SERPL-MCNC: 6.8 G/DL (ref 6.4–8.4)
PROTHROMBIN TIME: 12.8 SECONDS (ref 12.3–15)
QRS AXIS: 7 DEGREES
QRSD INTERVAL: 88 MS
QT INTERVAL: 450 MS
QTC INTERVAL: 464 MS
RBC # BLD AUTO: 4.65 MILLION/UL (ref 3.81–5.12)
SODIUM SERPL-SCNC: 140 MMOL/L (ref 135–147)
T WAVE AXIS: 21 DEGREES
VENTRICULAR RATE: 64 BPM
WBC # BLD AUTO: 7.31 THOUSAND/UL (ref 4.31–10.16)

## 2024-12-26 PROCEDURE — 99284 EMERGENCY DEPT VISIT MOD MDM: CPT

## 2024-12-26 PROCEDURE — 36415 COLL VENOUS BLD VENIPUNCTURE: CPT | Performed by: EMERGENCY MEDICINE

## 2024-12-26 PROCEDURE — 85025 COMPLETE CBC W/AUTO DIFF WBC: CPT | Performed by: EMERGENCY MEDICINE

## 2024-12-26 PROCEDURE — 85730 THROMBOPLASTIN TIME PARTIAL: CPT | Performed by: EMERGENCY MEDICINE

## 2024-12-26 PROCEDURE — 83880 ASSAY OF NATRIURETIC PEPTIDE: CPT | Performed by: EMERGENCY MEDICINE

## 2024-12-26 PROCEDURE — 93971 EXTREMITY STUDY: CPT | Performed by: STUDENT IN AN ORGANIZED HEALTH CARE EDUCATION/TRAINING PROGRAM

## 2024-12-26 PROCEDURE — 85379 FIBRIN DEGRADATION QUANT: CPT | Performed by: EMERGENCY MEDICINE

## 2024-12-26 PROCEDURE — 99284 EMERGENCY DEPT VISIT MOD MDM: CPT | Performed by: EMERGENCY MEDICINE

## 2024-12-26 PROCEDURE — 80053 COMPREHEN METABOLIC PANEL: CPT | Performed by: EMERGENCY MEDICINE

## 2024-12-26 PROCEDURE — 71045 X-RAY EXAM CHEST 1 VIEW: CPT

## 2024-12-26 PROCEDURE — 84484 ASSAY OF TROPONIN QUANT: CPT | Performed by: EMERGENCY MEDICINE

## 2024-12-26 PROCEDURE — 93005 ELECTROCARDIOGRAM TRACING: CPT

## 2024-12-26 PROCEDURE — 93971 EXTREMITY STUDY: CPT

## 2024-12-26 PROCEDURE — 85610 PROTHROMBIN TIME: CPT | Performed by: EMERGENCY MEDICINE

## 2024-12-26 NOTE — TELEPHONE ENCOUNTER
"Pt c/o RLE calf tightness and discomfort.  This leg is always swollen d/t her ongoing right knee issues.  The calf tightness is new and present x 1 week.  She is also c/o mild chest tightness at times and this morning she woke up with a mild pain between her shoulder blades.  Pt will go to [facility] for evaluation.     Reason for Disposition   Chest pain    Answer Assessment - Initial Assessment Questions  1. ONSET: \"When did the pain start?\"       Over the last week   2. LOCATION: \"Where is the pain located?\"       Right calf pain   3. PAIN: \"How bad is the pain?\"    (Scale 1-10; or mild, moderate, severe)      Mild to moderate   4. WORK OR EXERCISE: \"Has there been any recent work or exercise that involved this part of the body?\"       no  5. CAUSE: \"What do you think is causing the leg pain?\"      I'm not sure   6. OTHER SYMPTOMS: \"Do you have any other symptoms?\" (e.g., chest pain, back pain, breathing difficulty, swelling, rash, fever, numbness, weakness)      Mild chest discomfort and pain between shoulder blades   7. PREGNANCY: \"Is there any chance you are pregnant?\" \"When was your last menstrual period?\"      no    Protocols used: Leg Pain-Adult-[place]    "

## 2024-12-26 NOTE — TELEPHONE ENCOUNTER
Regarding: chest pain, calf pain  ----- Message from Lise PAZ sent at 12/26/2024  8:12 AM EST -----  Pt c/o right calf pain for a week, knee is always swollen so she can't tell if it is different, and also a  sharp pain in chest when lying down- not currently having pain. She also has tightness between shoulder blades. I tried to warm transfer but no answer.

## 2024-12-26 NOTE — ED PROVIDER NOTES
Time reflects when diagnosis was documented in both MDM as applicable and the Disposition within this note       Time User Action Codes Description Comment    12/26/2024 12:13 PM Jeffry Fontaine [M79.661] Right calf pain     12/26/2024 12:13 PM Jeffry Fontaine [R07.89] Atypical chest pain           ED Disposition       ED Disposition   Discharge    Condition   Stable    Date/Time   u Dec 26, 2024 12:13 PM    Comment   Anni GILLILAND Pilar discharge to home/self care.                   Assessment & Plan       Medical Decision Making  63 yo F with R leg swelling, tightness for the past week. Also c/o intermittent chest pain and back pain over the past few weeks without any other associated symptoms.  RLE duplex neg for DVT, but does show a likely R knee joint effusion. The knee on exam is not warm or tender to palpation. Doubt septic arthritis, most likely inflammatory or arthritic in nature.  Chest pain workup - neg troponins - dimer 0.65, less than 1.00 in a low risk Well's patient safely rules out PE.  Stable for d/c home - HEART score low as well.    Amount and/or Complexity of Data Reviewed  Labs: ordered.  Radiology: ordered and independent interpretation performed.        ED Course as of 12/28/24 1129   Thu Dec 26, 2024   1038 RLE venous duplex - negative per vascular tech report, did see some fluid around the anterior right knee.       Medications - No data to display    ED Risk Strat Scores   HEART Risk Score      Flowsheet Row Most Recent Value   Heart Score Risk Calculator    History 0 Filed at: 12/26/2024 1017   ECG 0 Filed at: 12/26/2024 1017   Age 1 Filed at: 12/26/2024 1017   Risk Factors 0 Filed at: 12/26/2024 1017   Troponin 0 Filed at: 12/26/2024 1017   HEART Score 1 Filed at: 12/26/2024 1017          HEART Risk Score      Flowsheet Row Most Recent Value   Heart Score Risk Calculator    History 0 Filed at: 12/26/2024 1017   ECG 0 Filed at: 12/26/2024 1017   Age 1 Filed at: 12/26/2024 1017   Risk  Factors 0 Filed at: 12/26/2024 1017   Troponin 0 Filed at: 12/26/2024 1017   HEART Score 1 Filed at: 12/26/2024 1017                        PERC Rule for PE      Flowsheet Row Most Recent Value   PERC Rule for PE    Age >=50 1 Filed at: 12/26/2024 1020   HR >=100 0 Filed at: 12/26/2024 1020   O2 Sat on room air < 95% 0 Filed at: 12/26/2024 1020   History of PE or DVT 0 Filed at: 12/26/2024 1020   Recent trauma or surgery 0 Filed at: 12/26/2024 1020   Hemoptysis 0 Filed at: 12/26/2024 1020   Exogenous estrogen 0 Filed at: 12/26/2024 1020   Unilateral leg swelling 1 Filed at: 12/26/2024 1020   PERC Rule for PE Results 2 Filed at: 12/26/2024 1020                Wells' Criteria for PE      Flowsheet Row Most Recent Value   Wells' Criteria for PE    Clinical signs and symptoms of DVT 3 Filed at: 12/26/2024 1020   PE is primary diagnosis or equally likely 0 Filed at: 12/26/2024 1020   HR >100 0 Filed at: 12/26/2024 1020   Immobilization at least 3 days or Surgery in the previous 4 weeks 0 Filed at: 12/26/2024 1020   Previous, objectively diagnosed PE or DVT 0 Filed at: 12/26/2024 1020   Hemoptysis 0 Filed at: 12/26/2024 1020   Malignancy with treatment within 6 months or palliative 0 Filed at: 12/26/2024 1020   Wells' Criteria Total 3 Filed at: 12/26/2024 1020                        History of Present Illness       Chief Complaint   Patient presents with    Leg Pain     R calf pain/tightness started 1 week ago. -thinners.        Past Medical History:   Diagnosis Date    Asthma     Closed fracture of neck of left radius 9/29/2021    Encounter for surveillance of contraceptives, unspecified 07/30/2013    H/O mammogram     Papanicolaou smear 04/02/2018    NEG    Plantar fasciitis 08/09/2017    Resolved:  November 24, 2017    Post-menopausal       Past Surgical History:   Procedure Laterality Date    CARPAL TUNNEL RELEASE Bilateral 2016    COLONOSCOPY  2012    normal    MAMMO (HISTORICAL) Bilateral 09/27/2018    No evidence  "of malignancy    KS COLONOSCOPY FLX DX W/COLLJ SPEC WHEN PFRMD N/A 2018    Procedure: COLONOSCOPY;  Surgeon: Anthony De Leon MD;  Location: [facility];  Service: Gastroenterology    VAGINAL DELIVERY      x3      Family History   Problem Relation Age of Onset    Osteoporosis Mother     Dementia Mother     Arthritis Mother         Knees    Hearing loss Mother     Asthma Father             No Known Problems Sister     Diabetes Daughter     Polycystic ovary syndrome Daughter     No Known Problems Daughter     Breast cancer Maternal Grandmother 40    Lung cancer Maternal Grandfather 60    No Known Problems Paternal Grandfather     Ulcerative colitis Son     No Known Problems Son     No Known Problems Maternal Aunt     No Known Problems Maternal Aunt     No Known Problems Maternal Aunt     No Known Problems Maternal Aunt     No Known Problems Maternal Aunt     No Known Problems Paternal Aunt     No Known Problems Paternal Aunt     No Known Problems Paternal Aunt     No Known Problems Paternal Aunt     No Known Problems Paternal Aunt     No Known Problems Paternal Aunt     COPD Cousin       Social History     Tobacco Use    Smoking status: Never    Smokeless tobacco: Never   Vaping Use    Vaping status: Never Used   Substance Use Topics    Alcohol use: Yes     Alcohol/week: 1.0 standard drink of alcohol     Types: 1 Glasses of wine per week    Drug use: Never      E-Cigarette/Vaping    E-Cigarette Use Never User       E-Cigarette/Vaping Substances    Nicotine No     THC No     CBD No     Flavoring No       I have reviewed and agree with the history as documented.     65 yo F coming in for eval of right leg \"tightness and swelling\" for the past week. She does also admit to intermittent sharp shooting chest and upper back pains as well during this time. There is a strong family history of blood clots, but no personal history of clots. Non-smoker, no recent travel, no estrogen supplements, no recent surgeries " or hospitalizations.      History provided by:  Patient   used: No    Leg Pain      Review of Systems   Cardiovascular:  Positive for chest pain.   Musculoskeletal:         R calf tenderness, swelling   All other systems reviewed and are negative.          Objective       ED Triage Vitals   Temperature Pulse Blood Pressure Respirations SpO2 Patient Position - Orthostatic VS   12/26/24 0938 12/26/24 0936 12/26/24 0936 12/26/24 0936 12/26/24 0936 12/26/24 0936   98 °F (36.7 °C) 75 121/63 16 97 % Sitting      Temp Source Heart Rate Source BP Location FiO2 (%) Pain Score    12/26/24 0936 12/26/24 0936 12/26/24 0936 -- 12/26/24 0936    Oral Monitor Left arm  2      Vitals      Date and Time Temp Pulse SpO2 Resp BP Pain Score FACES Pain Rating User   12/26/24 0938 98 °F (36.7 °C) -- -- -- -- -- -- LD   12/26/24 0936 -- 75 97 % 16 121/63 2 -- LD            Physical Exam  Vitals and nursing note reviewed.   Constitutional:       General: She is not in acute distress.     Appearance: Normal appearance. She is well-developed and normal weight. She is not ill-appearing, toxic-appearing or diaphoretic.   HENT:      Head: Normocephalic and atraumatic.      Right Ear: External ear normal.      Left Ear: External ear normal.      Nose: Nose normal.      Mouth/Throat:      Mouth: Mucous membranes are moist.      Pharynx: Oropharynx is clear.   Eyes:      Conjunctiva/sclera: Conjunctivae normal.   Cardiovascular:      Rate and Rhythm: Normal rate and regular rhythm.      Pulses: Normal pulses.      Heart sounds: Normal heart sounds.   Pulmonary:      Effort: Pulmonary effort is normal.      Breath sounds: Normal breath sounds.   Abdominal:      General: Abdomen is flat. Bowel sounds are normal. There is no distension or abdominal bruit. There are no signs of injury.      Palpations: Abdomen is soft. There is no shifting dullness.      Tenderness: There is no abdominal tenderness.   Genitourinary:     Adnexa:  Right adnexa normal and left adnexa normal.   Musculoskeletal:         General: Normal range of motion.      Cervical back: Normal range of motion and neck supple.      Comments: Mild R calf swelling, no tenderness to the posterior calf, negative Trinity's sign.   Skin:     General: Skin is warm and dry.      Capillary Refill: Capillary refill takes less than 2 seconds.   Neurological:      General: No focal deficit present.      Mental Status: She is alert and oriented to person, place, and time. Mental status is at baseline.   Psychiatric:         Mood and Affect: Mood normal.         Behavior: Behavior normal.         Results Reviewed       Procedure Component Value Units Date/Time    B-Type Natriuretic Peptide(BNP) [[id number]]  (Normal) Collected: 12/26/24 1131    Lab Status: Final result Specimen: Blood from Arm, Left Updated: 12/26/24 1211     BNP 18 pg/mL     HS Troponin 0hr (reflex protocol) [[id number]]  (Normal) Collected: 12/26/24 1131    Lab Status: Final result Specimen: Blood from Arm, Left Updated: 12/26/24 1204     hs TnI 0hr <2 ng/L     D-Dimer [[id number]]  (Abnormal) Collected: 12/26/24 1131    Lab Status: Final result Specimen: Blood from Arm, Left Updated: 12/26/24 1200     D-Dimer, Quant 0.65 ug/ml FEU     Narrative:      In the evaluation for possible pulmonary embolism, in the appropriate (Well's Score of 4 or less) patient, the age adjusted d-dimer cutoff for this patient can be calculated as:    Age x 0.01 (in ug/mL) for Age-adjusted D-dimer exclusion threshold for a patient over 50 years.    Comprehensive metabolic panel [[id number]] Collected: 12/26/24 1131    Lab Status: Final result Specimen: Blood from Arm, Left Updated: 12/26/24 1158     Sodium 140 mmol/L      Potassium 4.2 mmol/L      Chloride 107 mmol/L      CO2 26 mmol/L      ANION GAP 7 mmol/L      BUN 13 mg/dL      Creatinine 0.78 mg/dL      Glucose 103 mg/dL      Calcium 9.3 mg/dL      AST 16 U/L      ALT 18 U/L      Alkaline  Phosphatase 77 U/L      Total Protein 6.8 g/dL      Albumin 4.2 g/dL      Total Bilirubin 0.37 mg/dL      eGFR 81 ml/min/1.73sq m     Narrative:      National Kid[employer] Disease Foundation guidelines for Chronic Kidney Disease (CKD):     Stage 1 with normal or high GFR (GFR > 90 mL/min/1.73 square meters)    Stage 2 Mild CKD (GFR = 60-89 mL/min/1.73 square meters)    Stage 3A Moderate CKD (GFR = 45-59 mL/min/1.73 square meters)    Stage 3B Moderate CKD (GFR = 30-44 mL/min/1.73 square meters)    Stage 4 Severe CKD (GFR = 15-29 mL/min/1.73 square meters)    Stage 5 End Stage CKD (GFR <15 mL/min/1.73 square meters)  Note: GFR calculation is accurate only with a steady state creatinine    Protime-INR [[id number]]  (Normal) Collected: 12/26/24 1131    Lab Status: Final result Specimen: Blood from Arm, Left Updated: 12/26/24 1155     Protime 12.8 seconds      INR 0.90    Narrative:      INR Therapeutic Range    Indication                                             INR Range      Atrial Fibrillation                                               2.0-3.0  Hypercoagulable State                                    2.0.2.3  Left Ventricular Asist Device                            2.0-3.0  Mechanical Heart Valve                                  -    Aortic(with afib, MI, embolism, HF, LA enlargement,    and/or coagulopathy)                                     2.0-3.0 (2.5-3.5)     Mitral                                                             2.5-3.5  Prosthetic/Bioprosthetic Heart Valve               2.0-3.0  Venous thromboembolism (VTE: VT, PE        2.0-3.0    APTT [[id number]]  (Normal) Collected: 12/26/24 1131    Lab Status: Final result Specimen: Blood from Arm, Left Updated: 12/26/24 1155     PTT 26 seconds     CBC and differential [[id number]]  (Abnormal) Collected: 12/26/24 1131    Lab Status: Final result Specimen: Blood from Arm, Left Updated: 12/26/24 1141     WBC 7.31 Thousand/uL      RBC 4.65 Million/uL       Hemoglobin 13.1 g/dL      Hematocrit 40.6 %      MCV 87 fL      MCH 28.2 pg      MCHC 32.3 g/dL      RDW 14.9 %      MPV 9.6 fL      Platelets 276 Thousands/uL      nRBC 0 /100 WBCs      Segmented % 67 %      Immature Grans % 0 %      Lymphocytes % 16 %      Monocytes % 6 %      Eosinophils Relative 10 %      Basophils Relative 1 %      Absolute Neutrophils 4.89 Thousands/µL      Absolute Immature Grans 0.02 Thousand/uL      Absolute Lymphocytes 1.19 Thousands/µL      Absolute Monocytes 0.44 Thousand/µL      Eosinophils Absolute 0.73 Thousand/µL      Basophils Absolute 0.04 Thousands/µL             VAS lower limb venous duplex study, unilateral/limited   Final Interpretation by Nickolas Lee MD (12/26 4906)      XR chest 1 view portable   ED Interpretation by Jeffry Fontaine DO (12/26 1038)   No acute abnormalities.      Final Interpretation by Pablito Turcios MD (12/26 7132)      No acute cardiopulmonary disease.            Workstation performed: [id number]             ECG 12 Lead Documentation Only    Date/Time: 12/26/2024 11:36 AM    Performed by: Jeffry Fontaine DO  Authorized by: Jeffry Fontaine DO    Indications / Diagnosis:  Chest pain, leg swelling  ECG reviewed by me, the ED Provider: yes    Patient location:  ED  Previous ECG:     Previous ECG:  Compared to current    Similarity:  No change    Comparison to cardiac monitor: Yes    Interpretation:     Interpretation: normal    Rate:     ECG rate:  64  Rhythm:     Rhythm: sinus rhythm    Ectopy:     Ectopy: none    QRS:     QRS axis:  Normal    QRS intervals:  Normal  Conduction:     Conduction: normal    ST segments:     ST segments:  Normal  T waves:     T waves: normal        ED Medication and Procedure Management   Prior to Admission Medications   Prescriptions Last Dose Informant Patient Reported? Taking?   Trelegy Ellipta 200-62.5-25 MCG/ACT AEPB inhaler  Self Yes No   Sig: Inhale 1 puff daily   albuterol (PROVENTIL HFA,VENTOLIN HFA)  90 mcg/act inhaler   No No   Sig: INHALE 2 PUFFS EVERY 6 HOURS AS NEEDED FOR WHEEZING   fluticasone (FLONASE) 50 mcg/act nasal spray   No No   Sig: SPRAY 1 SPRAY INTO EACH NOSTRIL EVERY DAY   levocetirizine (XYZAL) 5 MG tablet   Yes No   Sig: Take 5 mg by mouth every evening   montelukast (SINGULAIR) 10 mg tablet   No No   Sig: TAKE 1 TABLET BY MOUTH EVERY DAY   olopatadine (PATANOL) 0.1 % ophthalmic solution  Self No No   Sig: Administer 1 drop to both eyes 2 (two) times a day      Facility-Administered Medications: None     Discharge Medication List as of 12/26/2024 12:15 PM        CONTINUE these medications which have NOT CHANGED    Details   albuterol (PROVENTIL HFA,VENTOLIN HFA) 90 mcg/act inhaler INHALE 2 PUFFS EVERY 6 HOURS AS NEEDED FOR WHEEZING, Normal      fluticasone (FLONASE) 50 mcg/act nasal spray SPRAY 1 SPRAY INTO EACH NOSTRIL EVERY DAY, Normal      levocetirizine (XYZAL) 5 MG tablet Take 5 mg by mouth every evening, Historical Med      montelukast (SINGULAIR) 10 mg tablet TAKE 1 TABLET BY MOUTH EVERY DAY, Starting Wed 11/27/2024, Normal      olopatadine (PATANOL) 0.1 % ophthalmic solution Administer 1 drop to both eyes 2 (two) times a day, Starting Wed 2/21/2024, Normal      Trelegy Ellipta 200-62.5-25 MCG/ACT AEPB inhaler Inhale 1 puff daily, Starting Sat 7/13/2024, Historical Med             ED SEPSIS DOCUMENTATION   Time reflects when diagnosis was documented in both MDM as applicable and the Disposition within this note       Time User Action Codes Description Comment    12/26/2024 12:13 PM Jeffry Fontaine [M79.661] Right calf pain     12/26/2024 12:13 PM Jeffry Fontaine [R07.89] Atypical chest pain                  Jeffry Fontaine DO  12/28/24 1129

## 2024-12-27 ENCOUNTER — TELEPHONE (OUTPATIENT)
Dept: ADMINISTRATIVE | Facility: OTHER | Age: 64
End: 2024-12-27

## 2024-12-27 NOTE — TELEPHONE ENCOUNTER
12/27/24 10:54 AM    Patient contacted post ED visit, first outreach attempt made. Message was left for patient to return a call to the [facility] Department at [facility]: Phone [phone].    Thank you.  [facility] Oliver MA  [facility]

## 2024-12-30 LAB
ATRIAL RATE: 64 BPM
P AXIS: 45 DEGREES
PR INTERVAL: 162 MS
QRS AXIS: 7 DEGREES
QRSD INTERVAL: 88 MS
QT INTERVAL: 450 MS
QTC INTERVAL: 464 MS
T WAVE AXIS: 21 DEGREES
VENTRICULAR RATE: 64 BPM

## 2024-12-30 PROCEDURE — 93010 ELECTROCARDIOGRAM REPORT: CPT | Performed by: INTERNAL MEDICINE

## 2024-12-30 NOTE — TELEPHONE ENCOUNTER
12/30/24 11:23 AM    Patient contacted post ED visit, [facility] department spoke with patient/caregiver and outreach was successful.    Thank you.  [facility] Oliver MA  [facility]

## 2024-12-31 ENCOUNTER — RESULTS FOLLOW-UP (OUTPATIENT)
Dept: OBGYN CLINIC | Facility: CLINIC | Age: 64
End: 2024-12-31

## 2025-01-14 ENCOUNTER — TELEPHONE (OUTPATIENT)
Age: 65
End: 2025-01-14

## 2025-01-14 DIAGNOSIS — H10.13 ALLERGIC CONJUNCTIVITIS OF BOTH EYES: ICD-10-CM

## 2025-01-14 NOTE — TELEPHONE ENCOUNTER
Pt was seen in the ED on 12/26 and they recommended a stress test and it was not ordered. Requesting a stress test to be ordered.

## 2025-01-14 NOTE — TELEPHONE ENCOUNTER
Medication: olopatadine (PATANOL) 0.1 % ophthalmic solution     Dose/Frequency: Administer 1 drop to both eyes 2 (two) times a day     Quantity: 5mL    Pharmacy: [facility]    Office:   [x] PCP/Provider -   [] Speciality/Provider -     Does the patient have enough for 3 days?   [x] Yes   [] No - Send as HP to POD

## 2025-01-15 ENCOUNTER — OFFICE VISIT (OUTPATIENT)
Dept: FAMILY MEDICINE CLINIC | Facility: CLINIC | Age: 65
End: 2025-01-15
Payer: COMMERCIAL

## 2025-01-15 VITALS
TEMPERATURE: 97.5 F | WEIGHT: 195 LBS | BODY MASS INDEX: 33.29 KG/M2 | SYSTOLIC BLOOD PRESSURE: 128 MMHG | RESPIRATION RATE: 16 BRPM | HEIGHT: 64 IN | DIASTOLIC BLOOD PRESSURE: 76 MMHG | OXYGEN SATURATION: 98 % | HEART RATE: 95 BPM

## 2025-01-15 DIAGNOSIS — R07.9 CHEST PAIN, UNSPECIFIED TYPE: Primary | ICD-10-CM

## 2025-01-15 DIAGNOSIS — J06.9 ACUTE URI: Primary | ICD-10-CM

## 2025-01-15 PROCEDURE — 99213 OFFICE O/P EST LOW 20 MIN: CPT

## 2025-01-15 RX ORDER — FLUTICASONE PROPIONATE 50 MCG
1 SPRAY, SUSPENSION (ML) NASAL DAILY
Qty: 48 ML | Refills: 2 | Status: SHIPPED | OUTPATIENT
Start: 2025-01-15

## 2025-01-15 RX ORDER — OLOPATADINE HYDROCHLORIDE 1 MG/ML
1 SOLUTION/ DROPS OPHTHALMIC 2 TIMES DAILY
Qty: 5 ML | Refills: 3 | Status: SHIPPED | OUTPATIENT
Start: 2025-01-15

## 2025-01-15 RX ORDER — BENZONATATE 100 MG/1
100 CAPSULE ORAL 3 TIMES DAILY PRN
Qty: 20 CAPSULE | Refills: 0 | Status: SHIPPED | OUTPATIENT
Start: 2025-01-15

## 2025-01-15 NOTE — PROGRESS NOTES
"Name: Anni Quezada      : 1960      MRN: [id number]  Encounter Provider: Jaz Chun DO  Encounter Date: 1/15/2025   Encounter department: [facility]  :  Assessment & Plan  Acute URI  Patient with 2 days of nasal congestion and sinus pressure.  Likely viral at this time.  Would recommend to continue supportive care with steaming, Mucinex, Flonase, saline rinses, warm fluids, honey.  Tessalon perles as needed for cough  Patient to send a message or call early next week if no improvement or worsening symptoms    Orders:    fluticasone (FLONASE) 50 mcg/act nasal spray; 1 spray into each nostril daily    benzonatate (TESSALON PERLES) 100 mg capsule; Take 1 capsule (100 mg total) by mouth 3 (three) times a day as needed for cough           History of Present Illness     HPI  Chief Complaint   Patient presents with    Follow-up     symptoms started two days ago, sinus pressure in head and cheeks, denies fever body aches and chills     Presents with 2 days of sinus pressure.  Denies any coughs, body aches, chills, fevers. Reports no recent sick contacts. Ste[place]andy, mucinex, has been using nasal spray.     Review of Systems   Constitutional:  Negative for chills and fever.   HENT:  Positive for congestion and sinus pain. Negative for postnasal drip.    Eyes:  Negative for visual disturbance.   Respiratory:  Negative for cough and shortness of breath.    Cardiovascular:  Negative for chest pain and palpitations.   Gastrointestinal:  Negative for abdominal pain, constipation, diarrhea, nausea and vomiting.   Genitourinary:  Negative for dysuria.   Musculoskeletal:  Negative for arthralgias.   Skin:  Negative for rash.   Neurological:  Negative for dizziness, light-headedness and headaches.       Objective   /76 (BP Location: Left arm, Patient Position: Sitting, Cuff Size: Standard)   Pulse 95   Temp 97.5 °F (36.4 °C) (Temporal)   Resp 16   Ht 5' 4\" (1.626 m)   Wt 88.5 kg (195 lb) "   LMP  (LMP Unknown)   SpO2 98%   BMI 33.47 kg/m²      Physical Exam  Vitals reviewed.   Constitutional:       General: She is not in acute distress.  HENT:      Head: Normocephalic and atraumatic.      Comments: Some frontal and maxillary sinus pressure     Right Ear: External ear normal.      Left Ear: External ear normal.      Nose: Congestion present.      Mouth/Throat:      Pharynx: No oropharyngeal exudate or posterior oropharyngeal erythema.   Eyes:      General:         Right eye: No discharge.         Left eye: No discharge.      Conjunctiva/sclera: Conjunctivae normal.   Cardiovascular:      Rate and Rhythm: Normal rate and regular rhythm.   Pulmonary:      Effort: Pulmonary effort is normal.      Breath sounds: Normal breath sounds.   Neurological:      Mental Status: She is alert and oriented to person, place, and time.   Psychiatric:         Mood and Affect: Mood normal.         Behavior: Behavior normal.         Thought Content: Thought content normal.         Judgment: Judgment normal.

## 2025-01-27 ENCOUNTER — HOSPITAL ENCOUNTER (OUTPATIENT)
Dept: NON INVASIVE DIAGNOSTICS | Facility: CLINIC | Age: 65
Discharge: HOME | End: 2025-01-27
Payer: COMMERCIAL

## 2025-01-27 ENCOUNTER — RESULTS FOLLOW-UP (OUTPATIENT)
Dept: FAMILY MEDICINE CLINIC | Facility: CLINIC | Age: 65
End: 2025-01-27

## 2025-01-27 VITALS
BODY MASS INDEX: 33.29 KG/M2 | SYSTOLIC BLOOD PRESSURE: 124 MMHG | HEART RATE: 81 BPM | DIASTOLIC BLOOD PRESSURE: 82 MMHG | WEIGHT: 195 LBS | OXYGEN SATURATION: 99 % | HEIGHT: 64 IN

## 2025-01-27 DIAGNOSIS — R07.9 CHEST PAIN, UNSPECIFIED TYPE: ICD-10-CM

## 2025-01-27 LAB
CHEST PAIN STATEMENT: NORMAL
MAX DIASTOLIC BP: 88 MMHG
MAX HR PERCENT: 102 %
MAX HR: 162 BPM
MAX PREDICTED HEART RATE: 158 BPM
PROTOCOL NAME: NORMAL
RATE PRESSURE PRODUCT: NORMAL
SL CV STRESS RECOVERY BP: NORMAL MMHG
SL CV STRESS RECOVERY HR: 100 BPM
SL CV STRESS RECOVERY O2 SAT: 98 %
SL CV STRESS STAGE REACHED: 3
STRESS ANGINA INDEX: 0
STRESS BASELINE BP: NORMAL MMHG
STRESS BASELINE HR: 81 BPM
STRESS O2 SAT REST: 99 %
STRESS PEAK HR: 162 BPM
STRESS POST ESTIMATED WORKLOAD: 10.1 METS
STRESS POST EXERCISE DUR MIN: 9 MIN
STRESS POST EXERCISE DUR MIN: 9 MIN
STRESS POST EXERCISE DUR SEC: 0 SEC
STRESS POST EXERCISE DUR SEC: 0 SEC
STRESS POST O2 SAT PEAK: 99 %
STRESS POST PEAK BP: 168 MMHG
STRESS POST PEAK HR: 162 BPM
STRESS POST PEAK SYSTOLIC BP: 172 MMHG
TARGET HR FORMULA: NORMAL
TEST INDICATION: NORMAL

## 2025-01-27 PROCEDURE — 93017 CV STRESS TEST TRACING ONLY: CPT

## 2025-01-27 PROCEDURE — 93018 CV STRESS TEST I&R ONLY: CPT | Performed by: STUDENT IN AN ORGANIZED HEALTH CARE EDUCATION/TRAINING PROGRAM

## 2025-01-27 PROCEDURE — 93016 CV STRESS TEST SUPVJ ONLY: CPT | Performed by: STUDENT IN AN ORGANIZED HEALTH CARE EDUCATION/TRAINING PROGRAM

## 2025-02-14 ENCOUNTER — HOSPITAL ENCOUNTER (OUTPATIENT)
Dept: MAMMOGRAPHY | Facility: CLINIC | Age: 65
Discharge: HOME | End: 2025-02-14
Payer: COMMERCIAL

## 2025-02-14 ENCOUNTER — HOSPITAL ENCOUNTER (OUTPATIENT)
Dept: ULTRASOUND IMAGING | Facility: CLINIC | Age: 65
Discharge: HOME | End: 2025-02-14
Payer: COMMERCIAL

## 2025-02-14 VITALS — BODY MASS INDEX: 31.58 KG/M2 | HEIGHT: 64 IN | WEIGHT: 185 LBS

## 2025-02-14 DIAGNOSIS — R92.8 ABNORMAL SCREENING MAMMOGRAM: ICD-10-CM

## 2025-02-14 PROCEDURE — G0279 TOMOSYNTHESIS, MAMMO: HCPCS

## 2025-02-14 PROCEDURE — 77066 DX MAMMO INCL CAD BI: CPT

## 2025-02-14 PROCEDURE — 76642 ULTRASOUND BREAST LIMITED: CPT

## 2025-02-27 ENCOUNTER — CONSULT (OUTPATIENT)
Dept: CARDIOLOGY CLINIC | Facility: CLINIC | Age: 65
End: 2025-02-27
Payer: COMMERCIAL

## 2025-02-27 VITALS
HEIGHT: 64 IN | BODY MASS INDEX: 32.66 KG/M2 | OXYGEN SATURATION: 97 % | SYSTOLIC BLOOD PRESSURE: 110 MMHG | WEIGHT: 191.3 LBS | DIASTOLIC BLOOD PRESSURE: 60 MMHG | HEART RATE: 79 BPM

## 2025-02-27 DIAGNOSIS — E78.00 HYPERCHOLESTEROLEMIA: ICD-10-CM

## 2025-02-27 DIAGNOSIS — R07.89 ATYPICAL CHEST PAIN: ICD-10-CM

## 2025-02-27 PROCEDURE — 99203 OFFICE O/P NEW LOW 30 MIN: CPT | Performed by: INTERNAL MEDICINE

## 2025-02-27 NOTE — PROGRESS NOTES
Cardiology Follow Up    Anni Quezada  1960  [id number]  [facility] [place]  [address] [place] [id number] [phone]    1. Hypercholesterolemia  2. Atypical chest pain  -     Ambulatory Referral to Cardiology      Discussion: She has been doing well.  She denied chest discomfort or dyspnea.  Recent stress test was normal.  She does have dyslipidemia with an LDL of 122 in 3/24. A calcium score was ordered to further assess risk and determine the advisability of lipid-lowering therapy.  Repeat lipid panel and hemoglobin A1c was also ordered.  I will see her in 3 months.    Cardiovascular History:   Ms. Quezada was initially seen in 2/25 for assessment of cardiac risk.  There is no history of overt heart disease.  She had an episode of atypical discomfort in 12/24; stress test in 1/25 was negative at 9 minutes of exercise on a Giuseppe protocol.  Risk factors are positive for dyslipidemia.  A lipid panel in 3/24 disclosed total cholesterol 203, , HDL 63, and triglycerides 89.  Hemoglobin A1c was 6.1 in 3/24.  There is no history of hypertension or smoking.  There is no history of coronary disease in first-degree relatives.  She is relatively active but does not exercise on a formal basis.  At the time of her initial visit in 2/25, a calcium score was ordered to further assess risk and determine the advisability of lipid-lowering therapy.    Patient Active Problem List   Diagnosis    Hx of colonic polyps    Hypercholesterolemia    Moderate persistent asthma without complication    Prediabetes    Eczema    Rosacea    SHANE (obstructive sleep apnea)    FHx: thyroid disease    Chronic cough     Past Medical History:   Diagnosis Date    Asthma     Closed fracture of neck of left radius 9/29/2021    Encounter for surveillance of contraceptives, unspecified 07/30/2013    H/O mammogram     Papanicolaou smear 04/02/2018    NEG     Plantar fasciitis 2017    Resolved:  2017    Post-menopausal      Social History     Socioeconomic History    Marital status: /Civil Union     Spouse name: Not on file    Number of children: 3    Years of education: Not on file    Highest education level: Not on file   Occupational History    Occupation:    Tobacco Use    Smoking status: Never    Smokeless tobacco: Never   Vaping Use    Vaping status: Never Used   Substance and Sexual Activity    Alcohol use: Yes     Alcohol/week: 1.0 standard drink of alcohol     Types: 1 Glasses of wine per week    Drug use: Never    Sexual activity: Not on file   Other Topics Concern    Not on file   Social History Narrative    Not on file     Social Drivers of Health     Financial Resource Strain: Not on file   Food Insecurity: Not on file   Transportation Needs: Not on file   Physical Activity: Not on file   Stress: Not on file   Social Connections: Not on file   Intimate Partner Violence: Not on file   Housing Stability: Not on file      Family History   Problem Relation Age of Onset    Osteoporosis Mother     Dementia Mother     Arthritis Mother         Knees    Hearing loss Mother     Asthma Father             No Known Problems Sister     Diabetes Daughter     Polycystic ovary syndrome Daughter     No Known Problems Daughter     Breast cancer Maternal Grandmother 40    Lung cancer Maternal Grandfather 60    No Known Problems Paternal Grandfather     Ulcerative colitis Son     No Known Problems Son     No Known Problems Maternal Aunt     No Known Problems Maternal Aunt     No Known Problems Maternal Aunt     No Known Problems Maternal Aunt     No Known Problems Maternal Aunt     No Known Problems Paternal Aunt     No Known Problems Paternal Aunt     No Known Problems Paternal Aunt     No Known Problems Paternal Aunt     No Known Problems Paternal Aunt     No Known Problems Paternal Aunt     COPD Cousin      Past Surgical History:    Procedure Laterality Date    CARPAL TUNNEL RELEASE Bilateral 2016    COLONOSCOPY  2012    normal    MAMMO (HISTORICAL) Bilateral 09/27/2018    No evidence of malignancy    IL COLONOSCOPY FLX DX W/COLLJ SPEC WHEN PFRMD N/A 11/26/2018    Procedure: COLONOSCOPY;  Surgeon: Anthony De Leon MD;  Location: [facility];  Service: Gastroenterology    VAGINAL DELIVERY      x3       Current Outpatient Medications:     albuterol (PROVENTIL HFA,VENTOLIN HFA) 90 mcg/act inhaler, INHALE 2 PUFFS EVERY 6 HOURS AS NEEDED FOR WHEEZING, Disp: 18 g, Rfl: 3    benzonatate (TESSALON PERLES) 100 mg capsule, Take 1 capsule (100 mg total) by mouth 3 (three) times a day as needed for cough, Disp: 20 capsule, Rfl: 0    fluticasone (FLONASE) 50 mcg/act nasal spray, 1 spray into each nostril daily, Disp: 48 mL, Rfl: 2    levocetirizine (XYZAL) 5 MG tablet, Take 5 mg by mouth every evening, Disp: , Rfl:     montelukast (SINGULAIR) 10 mg tablet, TAKE 1 TABLET BY MOUTH EVERY DAY, Disp: 90 tablet, Rfl: 1    olopatadine (PATANOL) 0.1 % ophthalmic solution, Administer 1 drop to both eyes 2 (two) times a day, Disp: 5 mL, Rfl: 3    Trelegy Ellipta 200-62.5-25 MCG/ACT AEPB inhaler, Inhale 1 puff daily, Disp: , Rfl:   Allergies   Allergen Reactions    Dog Epithelium (Canis Lupus Familiaris) Cough, Eye Swelling and Wheezing    Dog Epithelium Sneezing    Dust Mite Extract Sneezing    Other      seasonal    Tree Extract Sneezing       Labs: personally reviewed all pertinent labs  Imaging:  personally reviewed all pertinent imaging  Cath:  ECHO:  Stress:  Holter:    Review of Systems:  Review of Systems   Constitutional: Negative.   HENT: Negative.     Eyes: Negative.    Cardiovascular: Negative.    Respiratory: Negative.     Endocrine: Negative.    Hematologic/Lymphatic: Negative.    Skin: Negative.    Musculoskeletal: Negative.    Gastrointestinal: Negative.    Genitourinary: Negative.    Neurological: Negative.    Psychiatric/Behavioral: Negative.      Allergic/Immunologic: Negative.    All other systems reviewed and are negative.      Vitals:    02/27/25 1453   BP: 110/60   Pulse: 79   SpO2: 97%     Weight (last 2 days)       Date/Time Weight    02/27/25 1453 86.8 (191.3)            Physical Exam:  Physical Exam  Vitals reviewed.   Constitutional:       General: She is not in acute distress.     Appearance: She is well-developed. She is not diaphoretic.   HENT:      Head: Normocephalic and atraumatic.   Eyes:      General: No scleral icterus.     Conjunctiva/sclera: Conjunctivae normal.   Neck:      Vascular: No JVD.      Trachea: No tracheal deviation.   Cardiovascular:      Rate and Rhythm: Normal rate and regular rhythm.      Pulses: Intact distal pulses.      Heart sounds: Normal heart sounds. No murmur heard.     No friction rub. No gallop.   Pulmonary:      Effort: Pulmonary effort is normal. No respiratory distress.      Breath sounds: Normal breath sounds. No stridor. No wheezing or rales.   Chest:      Chest wall: No tenderness.   Abdominal:      General: Bowel sounds are normal. There is no distension.      Palpations: Abdomen is soft.      Tenderness: There is no abdominal tenderness.   Musculoskeletal:         General: No tenderness. Normal range of motion.      Cervical back: Normal range of motion and neck supple.   Skin:     General: Skin is warm and dry.      Findings: No erythema.   Neurological:      Mental Status: She is alert and oriented to person, place, and time.      Cranial Nerves: No cranial nerve deficit.      Coordination: Coordination normal.   Psychiatric:         Behavior: Behavior normal.         Thought Content: Thought content normal.         Judgment: Judgment normal.         Carlos Guardado MD

## 2025-03-07 ENCOUNTER — HOSPITAL ENCOUNTER (OUTPATIENT)
Dept: RADIOLOGY | Facility: HOSPITAL | Age: 65
Discharge: HOME | End: 2025-03-07
Attending: INTERNAL MEDICINE
Payer: COMMERCIAL

## 2025-03-07 DIAGNOSIS — R07.89 ATYPICAL CHEST PAIN: ICD-10-CM

## 2025-03-07 DIAGNOSIS — E78.00 HYPERCHOLESTEROLEMIA: ICD-10-CM

## 2025-03-07 PROCEDURE — 75571 CT HRT W/O DYE W/CA TEST: CPT

## 2025-03-26 DIAGNOSIS — J45.40 MODERATE PERSISTENT ASTHMA WITHOUT COMPLICATION: ICD-10-CM

## 2025-03-26 RX ORDER — ALBUTEROL SULFATE 90 UG/1
INHALANT RESPIRATORY (INHALATION)
Qty: 18 G | Refills: 0 | Status: SHIPPED | OUTPATIENT
Start: 2025-03-26

## 2025-03-31 ENCOUNTER — OFFICE VISIT (OUTPATIENT)
Dept: FAMILY MEDICINE CLINIC | Facility: CLINIC | Age: 65
End: 2025-03-31
Payer: COMMERCIAL

## 2025-03-31 VITALS
SYSTOLIC BLOOD PRESSURE: 124 MMHG | HEIGHT: 64 IN | RESPIRATION RATE: 18 BRPM | DIASTOLIC BLOOD PRESSURE: 72 MMHG | WEIGHT: 189.5 LBS | TEMPERATURE: 97.9 F | HEART RATE: 91 BPM | BODY MASS INDEX: 32.35 KG/M2 | OXYGEN SATURATION: 95 %

## 2025-03-31 DIAGNOSIS — J06.9 UPPER RESPIRATORY TRACT INFECTION, UNSPECIFIED TYPE: Primary | ICD-10-CM

## 2025-03-31 DIAGNOSIS — J45.40 MODERATE PERSISTENT ASTHMA WITHOUT COMPLICATION: ICD-10-CM

## 2025-03-31 LAB
SARS-COV-2 AG UPPER RESP QL IA: NEGATIVE
SL AMB POCT RAPID FLU A: NEGATIVE
SL AMB POCT RAPID FLU B: NEGATIVE
VALID CONTROL: NORMAL

## 2025-03-31 PROCEDURE — 87804 INFLUENZA ASSAY W/OPTIC: CPT | Performed by: FAMILY MEDICINE

## 2025-03-31 PROCEDURE — 99213 OFFICE O/P EST LOW 20 MIN: CPT | Performed by: FAMILY MEDICINE

## 2025-03-31 PROCEDURE — 87811 SARS-COV-2 COVID19 W/OPTIC: CPT | Performed by: FAMILY MEDICINE

## 2025-03-31 RX ORDER — DOXYCYCLINE HYCLATE 100 MG
100 TABLET ORAL 2 TIMES DAILY
Qty: 14 TABLET | Refills: 0 | Status: SHIPPED | OUTPATIENT
Start: 2025-03-31 | End: 2025-04-07

## 2025-03-31 RX ORDER — BENZONATATE 100 MG/1
100 CAPSULE ORAL 3 TIMES DAILY PRN
Qty: 20 CAPSULE | Refills: 0 | Status: SHIPPED | OUTPATIENT
Start: 2025-03-31

## 2025-03-31 NOTE — PROGRESS NOTES
"Name: Anni Quezada      : 1960      MRN: [id number]  Encounter Provider: Xuan Triplett MD  Encounter Date: 3/31/2025   Encounter department: [facility]  :  Assessment & Plan  Upper respiratory tract infection, unspecified type  Start doxycycline 100 mg twice daily for 7 days  Continue flonase and mucinex  Add Tessalon Perles for cough  Continue asthma medications  Follow up in office if no improvement     Orders:    Poct Covid 19 Rapid Antigen Test    POCT rapid flu A and B    doxycycline hyclate (VIBRA-TABS) 100 mg tablet; Take 1 tablet (100 mg total) by mouth 2 (two) times a day for 7 days    benzonatate (TESSALON PERLES) 100 mg capsule; Take 1 capsule (100 mg total) by mouth 3 (three) times a day as needed for cough    Moderate persistent asthma without complication                History of Present Illness   Patient presents today with nasal congestion and sinus pressure which started last night.  She has had a cough for 4 days with a scratchy throat.  Patient states she gags when she coughs.  She has congestion in her frontal sinuses.  She has tried Mucinex for the cough.  She has not used any nasal sprays at this time.  Afebrile.    Cough  Associated symptoms include headaches, rhinorrhea and wheezing. Pertinent negatives include no fever or sore throat.     Review of Systems   Constitutional:  Negative for fatigue and fever.   HENT:  Positive for congestion, rhinorrhea and sinus pressure. Negative for sore throat.    Respiratory:  Positive for cough and wheezing.    Neurological:  Positive for headaches.       Objective   /72 (BP Location: Left arm, Patient Position: Sitting, Cuff Size: Standard)   Pulse 91   Temp 97.9 °F (36.6 °C) (Temporal)   Resp 18   Ht 5' 4\" (1.626 m)   Wt 86 kg (189 lb 8 oz)   LMP  (LMP Unknown)   SpO2 95%   BMI 32.53 kg/m²      Physical Exam  Vitals and nursing note reviewed.   Constitutional:       Appearance: Normal appearance. She " is well-developed.   HENT:      Head: Normocephalic and atraumatic.      Nose: Congestion and rhinorrhea present.   Cardiovascular:      Rate and Rhythm: Normal rate and regular rhythm.   Pulmonary:      Effort: Pulmonary effort is normal.      Breath sounds: Wheezing present.   Abdominal:      General: Bowel sounds are normal.      Palpations: Abdomen is soft.   Musculoskeletal:      Cervical back: Normal range of motion.   Skin:     General: Skin is warm.   Neurological:      General: No focal deficit present.      Mental Status: She is alert.   Psychiatric:         Mood and Affect: Mood normal.         Speech: Speech normal.

## 2025-04-08 ENCOUNTER — TELEPHONE (OUTPATIENT)
Age: 65
End: 2025-04-08

## 2025-04-09 DIAGNOSIS — E78.00 HYPERCHOLESTEROLEMIA: Primary | ICD-10-CM

## 2025-04-09 DIAGNOSIS — Z83.49 FHX: THYROID DISEASE: ICD-10-CM

## 2025-04-09 DIAGNOSIS — G47.33 OSA (OBSTRUCTIVE SLEEP APNEA): ICD-10-CM

## 2025-04-10 ENCOUNTER — APPOINTMENT (OUTPATIENT)
Dept: LAB | Age: 65
End: 2025-04-10
Payer: COMMERCIAL

## 2025-04-10 DIAGNOSIS — E78.00 HYPERCHOLESTEROLEMIA: ICD-10-CM

## 2025-04-10 LAB
ALBUMIN SERPL BCG-MCNC: 4.3 G/DL (ref 3.5–5)
ALP SERPL-CCNC: 82 U/L (ref 34–104)
ALT SERPL W P-5'-P-CCNC: 23 U/L (ref 7–52)
ANION GAP SERPL CALCULATED.3IONS-SCNC: 10 MMOL/L (ref 4–13)
AST SERPL W P-5'-P-CCNC: 23 U/L (ref 13–39)
BASOPHILS # BLD AUTO: 0.08 THOUSANDS/ÂΜL (ref 0–0.1)
BASOPHILS NFR BLD AUTO: 1 % (ref 0–1)
BILIRUB SERPL-MCNC: 0.39 MG/DL (ref 0.2–1)
BUN SERPL-MCNC: 16 MG/DL (ref 5–25)
CALCIUM SERPL-MCNC: 9.6 MG/DL (ref 8.4–10.2)
CHLORIDE SERPL-SCNC: 105 MMOL/L (ref 96–108)
CHOLEST SERPL-MCNC: 212 MG/DL (ref ?–200)
CO2 SERPL-SCNC: 26 MMOL/L (ref 21–32)
CREAT SERPL-MCNC: 0.99 MG/DL (ref 0.6–1.3)
EOSINOPHIL # BLD AUTO: 0.47 THOUSAND/ÂΜL (ref 0–0.61)
EOSINOPHIL NFR BLD AUTO: 7 % (ref 0–6)
ERYTHROCYTE [DISTWIDTH] IN BLOOD BY AUTOMATED COUNT: 14.6 % (ref 11.6–15.1)
GFR SERPL CREATININE-BSD FRML MDRD: 61 ML/MIN/1.73SQ M
GLUCOSE P FAST SERPL-MCNC: 108 MG/DL (ref 65–99)
HCT VFR BLD AUTO: 44 % (ref 34.8–46.1)
HDLC SERPL-MCNC: 64 MG/DL
HGB BLD-MCNC: 13.5 G/DL (ref 11.5–15.4)
IMM GRANULOCYTES # BLD AUTO: 0.02 THOUSAND/UL (ref 0–0.2)
IMM GRANULOCYTES NFR BLD AUTO: 0 % (ref 0–2)
LDLC SERPL CALC-MCNC: 133 MG/DL (ref 0–100)
LYMPHOCYTES # BLD AUTO: 2.51 THOUSANDS/ÂΜL (ref 0.6–4.47)
LYMPHOCYTES NFR BLD AUTO: 35 % (ref 14–44)
MCH RBC QN AUTO: 26.3 PG (ref 26.8–34.3)
MCHC RBC AUTO-ENTMCNC: 30.7 G/DL (ref 31.4–37.4)
MCV RBC AUTO: 86 FL (ref 82–98)
MONOCYTES # BLD AUTO: 0.46 THOUSAND/ÂΜL (ref 0.17–1.22)
MONOCYTES NFR BLD AUTO: 6 % (ref 4–12)
NEUTROPHILS # BLD AUTO: 3.61 THOUSANDS/ÂΜL (ref 1.85–7.62)
NEUTS SEG NFR BLD AUTO: 51 % (ref 43–75)
NRBC BLD AUTO-RTO: 0 /100 WBCS
PLATELET # BLD AUTO: 350 THOUSANDS/UL (ref 149–390)
PMV BLD AUTO: 10.4 FL (ref 8.9–12.7)
POTASSIUM SERPL-SCNC: 4.4 MMOL/L (ref 3.5–5.3)
PROT SERPL-MCNC: 7.2 G/DL (ref 6.4–8.4)
RBC # BLD AUTO: 5.14 MILLION/UL (ref 3.81–5.12)
SODIUM SERPL-SCNC: 141 MMOL/L (ref 135–147)
TRIGL SERPL-MCNC: 77 MG/DL (ref ?–150)
TSH SERPL DL<=0.05 MIU/L-ACNC: 3.01 UIU/ML (ref 0.45–4.5)
WBC # BLD AUTO: 7.15 THOUSAND/UL (ref 4.31–10.16)

## 2025-04-10 PROCEDURE — 80053 COMPREHEN METABOLIC PANEL: CPT | Performed by: FAMILY MEDICINE

## 2025-04-10 PROCEDURE — 84443 ASSAY THYROID STIM HORMONE: CPT | Performed by: FAMILY MEDICINE

## 2025-04-10 PROCEDURE — 80061 LIPID PANEL: CPT

## 2025-04-10 PROCEDURE — 85025 COMPLETE CBC W/AUTO DIFF WBC: CPT | Performed by: FAMILY MEDICINE

## 2025-04-10 PROCEDURE — 36415 COLL VENOUS BLD VENIPUNCTURE: CPT | Performed by: FAMILY MEDICINE

## 2025-04-10 PROCEDURE — 83036 HEMOGLOBIN GLYCOSYLATED A1C: CPT

## 2025-04-11 ENCOUNTER — TELEPHONE (OUTPATIENT)
Age: 65
End: 2025-04-11

## 2025-04-11 LAB
EST. AVERAGE GLUCOSE BLD GHB EST-MCNC: 148 MG/DL
HBA1C MFR BLD: 6.8 %

## 2025-04-11 NOTE — TELEPHONE ENCOUNTER
Patient completed blood work yesterday. She asked if any concerns or new recommendations regarding results of lipid panel and A 1C.

## 2025-04-11 NOTE — TELEPHONE ENCOUNTER
Pt was called and it was explained to her that her cholesterol remains high, but her coronary calcium score was 0, which is good. These results can be discussed at the time of her next appointment. Pt understood.

## 2025-04-15 ENCOUNTER — OFFICE VISIT (OUTPATIENT)
Dept: FAMILY MEDICINE CLINIC | Facility: CLINIC | Age: 65
End: 2025-04-15
Payer: COMMERCIAL

## 2025-04-15 VITALS
SYSTOLIC BLOOD PRESSURE: 112 MMHG | OXYGEN SATURATION: 97 % | BODY MASS INDEX: 32.79 KG/M2 | WEIGHT: 191 LBS | HEART RATE: 81 BPM | RESPIRATION RATE: 16 BRPM | TEMPERATURE: 97.9 F | DIASTOLIC BLOOD PRESSURE: 68 MMHG

## 2025-04-15 DIAGNOSIS — J45.40 MODERATE PERSISTENT ASTHMA WITHOUT COMPLICATION: ICD-10-CM

## 2025-04-15 DIAGNOSIS — R73.03 PREDIABETES: ICD-10-CM

## 2025-04-15 DIAGNOSIS — G47.33 OSA (OBSTRUCTIVE SLEEP APNEA): ICD-10-CM

## 2025-04-15 DIAGNOSIS — E78.00 HYPERCHOLESTEROLEMIA: Primary | ICD-10-CM

## 2025-04-15 DIAGNOSIS — K21.9 GASTROESOPHAGEAL REFLUX DISEASE WITHOUT ESOPHAGITIS: ICD-10-CM

## 2025-04-15 DIAGNOSIS — Z00.00 WELL ADULT EXAM: ICD-10-CM

## 2025-04-15 PROCEDURE — 99214 OFFICE O/P EST MOD 30 MIN: CPT | Performed by: FAMILY MEDICINE

## 2025-04-15 PROCEDURE — 99396 PREV VISIT EST AGE 40-64: CPT | Performed by: FAMILY MEDICINE

## 2025-04-15 RX ORDER — OMEPRAZOLE 20 MG/1
20 CAPSULE, DELAYED RELEASE ORAL
Qty: 30 CAPSULE | Refills: 0 | Status: SHIPPED | OUTPATIENT
Start: 2025-04-15 | End: 2026-04-10

## 2025-04-15 NOTE — PROGRESS NOTES
Answers submitted by the patient for this visit:  Annual Physical (Submitted on 4/10/2025)  Diet/Nutrition choices: well balanced diet, portion control, limited junk food  Exercise choices: walking, moderate cardiovascular exercise, strength training exercises, 1-2 times a week on average, 30-60 minutes on average  Sleep choices: sleeps poorly, 4-6 hours of sleep on average  Sleep additional comments: Use a mouth device for sleep apnea  Hearing choices: normal hearing right ear, normal hearing left ear, normal hearing bilateral ears  Vision choices: most recent eye exam > 1 year ago  Vision additional comments: Wear glasses for reading and computer work  Dental choices: regular dental visits, brushes teeth twice daily, floss regularly  Do you currently have an OB/GYN provider that you routinely follow with?: Yes  Menopausal status: postmenopausal  Any history of sexual transmitted disease/infection?: No  Do you have an advance directive/living will?: Yes  Do you have a durable power of  (POA)?: Yes  Name: Anni Quezada      : 1960      MRN: [id number]  Encounter Provider: Abel Resendiz MD  Encounter Date: 4/15/2025   Encounter department: [facility]  :  Assessment & Plan  Hypercholesterolemia-w/ high HDL    Lab Results   Component Value Date    CHOLESTEROL 212 (H) 04/10/2025    CHOLESTEROL 203 (H) 2024    CHOLESTEROL 227 (H) 2021     Lab Results   Component Value Date    HDL 64 04/10/2025    HDL 63 2024    HDL 81 2021     Lab Results   Component Value Date    TRIG 77 04/10/2025    TRIG 89 2024    TRIG 63 2021     Lab Results   Component Value Date    LDLCALC 133 (H) 04/10/2025     2025 CT coronary Ca++ score 0    2025 Cardiology evaluation     2025 Exercise stress test normal     I discussed statin Rx  patient prefers to work on diet, exercise and weight loss  Follow up with Cardiology          Prediabetes    Prior A1c dating back to  2019 have been in the prediabetes range-5.9 to 6.2         Lab Results   Component Value Date    HGBA1C 6.8 (H) 04/10/2025     Repeat A1c in 3 months  Diet, weight loss and exercise    Orders:    Hemoglobin A1C    SHANE (obstructive sleep apnea)    SHANE wears oral appliance-could not tolerate CPAP.          Moderate persistent asthma without complication    Stable on Trelegy daily and Singulair 10 mg/day.          Gastroesophageal reflux disease without esophagitis    Episodes of belching with difficulty swallowing perceived in upper throat area. Symptom relief with trial of Omeprazole. No actual reflux symptoms.     Trial of Omeprazole 20 mg/day for 30 days. GI evaluation for persistent symptoms     Orders:    omeprazole (PriLOSEC) 20 mg delayed release capsule; Take 1 capsule (20 mg total) by mouth daily before breakfast    Well adult exam    02/2025 mammogram    09/2024 colonoscopy     08/2024 GYN exam/pap smear     Prior Life Line screening. Carotid artery screen normal. 4 limb EKG NSR. AAA screen normal. PAD screen normal.     FH/SH reviewed    Immunizations reviewed                 History of Present Illness     CC follow up visit to review labs/Wellness exam       Review of Systems   Constitutional:  Negative for appetite change, chills, fatigue, fever and unexpected weight change.   HENT:  Negative for congestion, ear pain, rhinorrhea, sore throat and trouble swallowing.    Eyes:  Negative for visual disturbance.   Respiratory:  Positive for apnea. Negative for cough, chest tightness, shortness of breath and wheezing.    Cardiovascular:  Negative for chest pain, palpitations and leg swelling.   Gastrointestinal:  Negative for abdominal pain, blood in stool, constipation, diarrhea, nausea and vomiting.   Endocrine: Negative for cold intolerance and heat intolerance.        08/2022 heel study T score 0.951. Vitamin D 22. + FH thyroid disease sister    Genitourinary:  Negative for difficulty urinating and flank  pain.   Musculoskeletal:  Negative for arthralgias and myalgias.   Skin:  Negative for rash.   Allergic/Immunologic: Positive for environmental allergies.   Neurological:  Negative for dizziness and headaches.   Hematological:  Negative for adenopathy. Does not bruise/bleed easily.   Psychiatric/Behavioral:  Negative for dysphoric mood and sleep disturbance.      Family History   Problem Relation Age of Onset    Osteoporosis Mother     Dementia Mother     Arthritis Mother         Knees    Hearing loss Mother     Asthma Father             No Known Problems Sister     Diabetes Daughter     Polycystic ovary syndrome Daughter     No Known Problems Daughter     Breast cancer Maternal Grandmother 40    Cancer Maternal Grandmother         -Breast/Lukemia    Lung cancer Maternal Grandfather 60    Cancer Maternal Grandfather         -Lung    Breast cancer Paternal Grandmother             No Known Problems Paternal Grandfather     Ulcerative colitis Son     Crohn's disease Son         Ulcerative Colitis    Asthma Maternal Aunt             Dementia Maternal Aunt             Hearing loss Maternal Aunt             No Known Problems Maternal Aunt     No Known Problems Maternal Aunt     No Known Problems Maternal Aunt     No Known Problems Paternal Aunt     No Known Problems Paternal Aunt     No Known Problems Paternal Aunt     No Known Problems Paternal Aunt     No Known Problems Paternal Aunt     No Known Problems Paternal Aunt     COPD Cousin     Asthma Brother     Asthma Sister     Arthritis Sister         Knees    Dementia Maternal Uncle     Hearing loss Maternal Uncle     COPD Cousin     Hearing loss Maternal Aunt     COPD Cousin       Social History     Socioeconomic History    Marital status: /Civil Union     Spouse name: None    Number of children: 3    Years of education: None    Highest education level: None   Occupational History    Occupation:     Tobacco Use    Smoking status: Never    Smokeless tobacco: Never   Vaping Use    Vaping status: Never Used   Substance and Sexual Activity    Alcohol use: Yes     Alcohol/week: 1.0 standard drink of alcohol     Types: 1 Glasses of wine per week    Drug use: Never    Sexual activity: Yes     Partners: Male     Birth control/protection: Post-menopausal   Other Topics Concern    None   Social History Narrative    None     Social Drivers of Health     Financial Resource Strain: Not on file   Food Insecurity: No Food Insecurity (4/10/2025)    Hunger Vital Sign     Worried About Running Out of Food in the Last Year: Never true     Ran Out of Food in the Last Year: Never true   Transportation Needs: No Transportation Needs (4/10/2025)    PRAPARE - Transportation     Lack of Transportation (Medical): No     Lack of Transportation (Non-Medical): No   Physical Activity: Not on file   Stress: Not on file   Social Connections: Not on file   Intimate Partner Violence: Not on file   Housing Stability: Low Risk  (4/10/2025)    Housing Stability Vital Sign     Unable to Pay for Housing in the Last Year: No     Number of Times Moved in the Last Year: 0     Homeless in the Last Year: No        Objective   /68 (BP Location: Left arm, Patient Position: Sitting, Cuff Size: Large)   Pulse 81   Temp 97.9 °F (36.6 °C) (Temporal)   Resp 16   Wt 86.6 kg (191 lb)   LMP  (LMP Unknown)   SpO2 97%   BMI 32.79 kg/m²      Wt Readings from Last 3 Encounters:   04/15/25 86.6 kg (191 lb)   03/31/25 86 kg (189 lb 8 oz)   02/27/25 86.8 kg (191 lb 4.8 oz)     Physical Exam  Constitutional:       General: She is not in acute distress.  HENT:      Right Ear: Tympanic membrane and ear canal normal.      Left Ear: Tympanic membrane and ear canal normal.      Mouth/Throat:      Mouth: No oral lesions.      Pharynx: Oropharynx is clear.   Eyes:      General: No scleral icterus.     Extraocular Movements: Extraocular movements intact.       Conjunctiva/sclera: Conjunctivae normal.      Pupils: Pupils are equal, round, and reactive to light.   Neck:      Thyroid: No thyroid mass or thyromegaly.      Vascular: Normal carotid pulses. No carotid bruit or JVD.   Cardiovascular:      Rate and Rhythm: Normal rate and regular rhythm.      Heart sounds: No murmur heard.     No gallop.   Pulmonary:      Effort: Pulmonary effort is normal.      Breath sounds: Normal breath sounds. No wheezing or rales.   Musculoskeletal:      Right lower leg: No edema.      Left lower leg: No edema.   Lymphadenopathy:      Cervical: No cervical adenopathy.      Upper Body:      Right upper body: No supraclavicular adenopathy.      Left upper body: No supraclavicular adenopathy.   Skin:     Coloration: Skin is not cyanotic.      Findings: No rash.      Nails: There is no clubbing.   Neurological:      General: No focal deficit present.      Mental Status: She is alert and oriented to person, place, and time.   Psychiatric:         Mood and Affect: Mood normal.

## 2025-04-15 NOTE — ASSESSMENT & PLAN NOTE
Prior A1c dating back to 2019 have been in the prediabetes range-5.9 to 6.2         Lab Results   Component Value Date    HGBA1C 6.8 (H) 04/10/2025     Repeat A1c in 3 months  Diet, weight loss and exercise    Orders:    Hemoglobin A1C

## 2025-04-15 NOTE — ASSESSMENT & PLAN NOTE
Lab Results   Component Value Date    CHOLESTEROL 212 (H) 04/10/2025    CHOLESTEROL 203 (H) 03/05/2024    CHOLESTEROL 227 (H) 11/19/2021     Lab Results   Component Value Date    HDL 64 04/10/2025    HDL 63 03/05/2024    HDL 81 11/19/2021     Lab Results   Component Value Date    TRIG 77 04/10/2025    TRIG 89 03/05/2024    TRIG 63 11/19/2021     Lab Results   Component Value Date    LDLCALC 133 (H) 04/10/2025     03/2025 CT coronary Ca++ score 0    02/2025 Cardiology evaluation     01/2025 Exercise stress test normal     I discussed statin Rx  patient prefers to work on diet, exercise and weight loss  Follow up with Cardiology

## 2025-05-07 DIAGNOSIS — K21.9 GASTROESOPHAGEAL REFLUX DISEASE WITHOUT ESOPHAGITIS: ICD-10-CM

## 2025-05-08 RX ORDER — OMEPRAZOLE 20 MG/1
20 CAPSULE, DELAYED RELEASE ORAL
Qty: 90 CAPSULE | Refills: 1 | Status: SHIPPED | OUTPATIENT
Start: 2025-05-08

## 2025-05-22 DIAGNOSIS — H10.13 ALLERGIC CONJUNCTIVITIS OF BOTH EYES: ICD-10-CM

## 2025-05-23 RX ORDER — OLOPATADINE HYDROCHLORIDE 1 MG/ML
SOLUTION/ DROPS OPHTHALMIC
Qty: 5 ML | Refills: 3 | Status: SHIPPED | OUTPATIENT
Start: 2025-05-23

## 2025-05-28 DIAGNOSIS — J45.40 MODERATE PERSISTENT ASTHMA WITHOUT COMPLICATION: ICD-10-CM

## 2025-05-28 RX ORDER — MONTELUKAST SODIUM 10 MG/1
10 TABLET ORAL DAILY
Qty: 90 TABLET | Refills: 1 | Status: SHIPPED | OUTPATIENT
Start: 2025-05-28

## 2025-06-04 ENCOUNTER — OFFICE VISIT (OUTPATIENT)
Dept: CARDIOLOGY CLINIC | Facility: CLINIC | Age: 65
End: 2025-06-04
Payer: COMMERCIAL

## 2025-06-04 VITALS
HEART RATE: 80 BPM | SYSTOLIC BLOOD PRESSURE: 100 MMHG | WEIGHT: 192.9 LBS | OXYGEN SATURATION: 96 % | BODY MASS INDEX: 32.93 KG/M2 | DIASTOLIC BLOOD PRESSURE: 70 MMHG | HEIGHT: 64 IN

## 2025-06-04 DIAGNOSIS — E78.00 HYPERCHOLESTEROLEMIA: Primary | ICD-10-CM

## 2025-06-04 PROCEDURE — 99214 OFFICE O/P EST MOD 30 MIN: CPT | Performed by: INTERNAL MEDICINE

## 2025-06-04 NOTE — PROGRESS NOTES
Cardiology Follow Up    Anni Quezada  1960  [id number]  [facility]  [address]  [place] [id number] [phone]    1. Hypercholesterolemia      Discussion: She has been doing well.  She denied chest discomfort or dyspnea.  Repeat lipid testing was similar. LDL was 133. HbA1c was 6.8. Calcium score was 0. She and Dr. Resendiz decided to recheck her choleterol in 1 month and decide on rx based on that. I also ordered a hsCRP to further refine risk. BP is excellent. We discussed weight management. I will see her in 6 months.     Cardiovascular History:   Ms. Quezada was initially seen in 2/25 for assessment of cardiac risk.  There is no history of overt heart disease.  She had an episode of atypical discomfort in 12/24; stress test in 1/25 was negative at 9 minutes of exercise on a Giuseppe protocol.  Risk factors are positive for dyslipidemia.  A lipid panel in 3/24 disclosed total cholesterol 203, , HDL 63, and triglycerides 89. Repeat in 4/25 showed cholesterol 212, , HDL 64 and triglycerides 77.  Hemoglobin A1c was 6.1 in 3/24 and 6.8 in 4/25.  There is no history of hypertension or smoking.  There is no history of coronary disease in first-degree relatives.  She is relatively active but does not exercise on a formal basis.  Calcium score in 3/25 was 0.       Patient Active Problem List   Diagnosis    Hx of colonic polyps    Hypercholesterolemia    Moderate persistent asthma without complication    Prediabetes    Eczema    Rosacea    SHANE (obstructive sleep apnea)    FHx: thyroid disease    Chronic cough     Past Medical History:   Diagnosis Date    Asthma     Closed fracture of neck of left radius 09/29/2021    Encounter for surveillance of contraceptives, unspecified 07/30/2013    H/O mammogram     Papanicolaou smear 04/02/2018    NEG    Plantar fasciitis 08/09/2017    Resolved:  November 24, 2017     Post-menopausal     Sinus problem     Sleep apnea     Supplies, w ill bring along     Social History     Socioeconomic History    Marital status: /Civil Union     Spouse name: Not on file    Number of children: 3    Years of education: Not on file    Highest education level: Not on file   Occupational History    Occupation:    Tobacco Use    Smoking status: Never    Smokeless tobacco: Never   Vaping Use    Vaping status: Never Used   Substance and Sexual Activity    Alcohol use: Yes     Alcohol/week: 1.0 standard drink of alcohol     Types: 1 Glasses of wine per week    Drug use: Never    Sexual activity: Yes     Partners: Male     Birth control/protection: None   Other Topics Concern    Not on file   Social History Narrative    Not on file     Social Drivers of Health     Financial Resource Strain: Not on file   Food Insecurity: No Food Insecurity (4/10/2025)    Nursing - Inadequate Food Risk Classification     Worried About Running Out of Food in the Last Year: Never true     Ran Out of Food in the Last Year: Never true     Ran Out of Food in the Last Year: Not on file   Transportation Needs: No Transportation Needs (4/10/2025)    PRAPARE - Transportation     Lack of Transportation (Medical): No     Lack of Transportation (Non-Medical): No   Physical Activity: Not on file   Stress: Not on file   Social Connections: Not on file   Intimate Partner Violence: Not on file   Housing Stability: Low Risk  (4/10/2025)    Housing Stability Vital Sign     Unable to Pay for Housing in the Last Year: No     Number of Times Moved in the Last Year: 0     Homeless in the Last Year: No      Family History   Problem Relation Name Age of Onset    Osteoporosis Mother Deyanira GILLILAND David     Dementia Mother Deyanira DARSHANA Hernandez     Arthritis Mother Deyanira GILLILAND David         Knees    Hearing loss Mother Deyanira Hernandez     Asthma Father Marcelino BIPIN Hernandez             No Known Problems Sister      Diabetes Daughter Corinne  Pilar     Polycystic ovary syndrome Daughter Corinne Pilar     No Known Problems Daughter      Breast cancer Maternal Grandmother Venkata Howardt 40    Cancer Maternal Grandmother Venkata Edwards         -Breast/Lukemia    Lung cancer Maternal Grandfather Rolf Howardt 60    Cancer Maternal Grandfather Rolf Edwards         -Lung    Breast cancer Paternal Grandmother Venkata Edwards             No Known Problems Paternal Grandfather      Ulcerative colitis Son      Crohn's disease Son Franklin Pilar         Ulcerative Colitis    Asthma Maternal Aunt Marimar Butcher             Dementia Maternal Aunt Libby Wallace             Hearing loss Maternal Aunt Libby Wallace             No Known Problems Maternal Aunt      No Known Problems Maternal Aunt      No Known Problems Maternal Aunt      No Known Problems Paternal Aunt      No Known Problems Paternal Aunt      No Known Problems Paternal Aunt      No Known Problems Paternal Aunt      No Known Problems Paternal Aunt      No Known Problems Paternal Aunt      COPD Cousin Bela Mills     Asthma Brother Marcelino Hernandez Jr.     Asthma Sister Sania A David     Arthritis Sister Sania David         Knees    Dementia Maternal Uncle Rolf Edwards     Hearing loss Maternal Uncle Rolf Edwards     COPD Cousin Bela Mills     Hearing loss Maternal Aunt Jacqueline Bullock     COPD Cousin Bela Mills      Past Surgical History:   Procedure Laterality Date    CARPAL TUNNEL RELEASE Bilateral 2016    COLONOSCOPY      normal    MAMMO (HISTORICAL) Bilateral 2018    No evidence of malignancy    VT COLONOSCOPY FLX DX W/COLLJ SPEC WHEN PFRMD N/A 2018    Procedure: COLONOSCOPY;  Surgeon: Anthony De Leon MD;  Location: [facility];  Service: Gastroenterology    VAGINAL DELIVERY      x3       Current Outpatient Medications:     albuterol (PROVENTIL HFA,VENTOLIN HFA) 90 mcg/act inhaler, TAKE 2 PUFFS BY MOUTH EVERY 6 HOURS AS NEEDED  FOR WHEEZE, Disp: 18 g, Rfl: 0    fluticasone (FLONASE) 50 mcg/act nasal spray, 1 spray into each nostril daily, Disp: 48 mL, Rfl: 2    levocetirizine (XYZAL) 5 MG tablet, Take 5 mg by mouth every evening, Disp: , Rfl:     montelukast (SINGULAIR) 10 mg tablet, TAKE 1 TABLET BY MOUTH EVERY DAY, Disp: 90 tablet, Rfl: 1    olopatadine (PATANOL) 0.1 % ophthalmic solution, INSTILL 1 DROP INTO BOTH EYES TWICE A DAY, Disp: 5 mL, Rfl: 3    omeprazole (PriLOSEC) 20 mg delayed release capsule, TAKE 1 CAPSULE BY MOUTH DAILY BEFORE BREAKFAST., Disp: 90 capsule, Rfl: 1    Trelegy Ellipta 200-62.5-25 MCG/ACT AEPB inhaler, Inhale 1 puff in the morning., Disp: , Rfl:   Allergies   Allergen Reactions    Dog Epithelium (Canis Lupus Familiaris) Cough, Eye Swelling and Wheezing    Dog Epithelium Sneezing    Dust Mite Extract Sneezing    Other      seasonal    Tree Extract Sneezing       Labs: personally reviewed all pertinent labs  Imaging:  personally reviewed all pertinent imaging  Cath:  ECHO:  Stress:  Holter:    Review of Systems:  Review of Systems   Constitutional: Negative.   HENT: Negative.     Eyes: Negative.    Cardiovascular: Negative.    Respiratory: Negative.     Endocrine: Negative.    Hematologic/Lymphatic: Negative.    Skin: Negative.    Musculoskeletal: Negative.    Gastrointestinal: Negative.    Genitourinary: Negative.    Neurological: Negative.    Psychiatric/Behavioral: Negative.     Allergic/Immunologic: Negative.    All other systems reviewed and are negative.      Vitals:    06/04/25 0931   BP: 100/70   Pulse: 80   SpO2: 96%     Weight (last 2 days)       Date/Time Weight    06/04/25 0931 87.5 (192.9)            Physical Exam:  Physical Exam  Vitals reviewed.   Constitutional:       General: She is not in acute distress.     Appearance: She is well-developed. She is not diaphoretic.   HENT:      Head: Normocephalic and atraumatic.     Eyes:      General: No scleral icterus.     Conjunctiva/sclera: Conjunctivae  normal.     Neck:      Vascular: No JVD.      Trachea: No tracheal deviation.     Cardiovascular:      Rate and Rhythm: Normal rate and regular rhythm.      Pulses: Intact distal pulses.      Heart sounds: Normal heart sounds. No murmur heard.     No friction rub. No gallop.   Pulmonary:      Effort: Pulmonary effort is normal. No respiratory distress.      Breath sounds: Normal breath sounds. No stridor. No wheezing or rales.   Chest:      Chest wall: No tenderness.   Abdominal:      General: Bowel sounds are normal. There is no distension.      Palpations: Abdomen is soft.      Tenderness: There is no abdominal tenderness.     Musculoskeletal:         General: No tenderness. Normal range of motion.      Cervical back: Normal range of motion and neck supple.     Skin:     General: Skin is warm and dry.      Findings: No erythema.     Neurological:      Mental Status: She is alert and oriented to person, place, and time.      Cranial Nerves: No cranial nerve deficit.      Coordination: Coordination normal.     Psychiatric:         Behavior: Behavior normal.         Thought Content: Thought content normal.         Judgment: Judgment normal.         Carlos Guarddao MD

## 2025-06-11 ENCOUNTER — HOSPITAL ENCOUNTER (OUTPATIENT)
Dept: RADIOLOGY | Age: 65
Discharge: HOME | End: 2025-06-11
Attending: OTOLARYNGOLOGY
Payer: COMMERCIAL

## 2025-06-11 ENCOUNTER — APPOINTMENT (OUTPATIENT)
Dept: LAB | Age: 65
End: 2025-06-11
Attending: STUDENT IN AN ORGANIZED HEALTH CARE EDUCATION/TRAINING PROGRAM
Payer: COMMERCIAL

## 2025-06-11 DIAGNOSIS — J32.9 RECURRENT SINUSITIS: ICD-10-CM

## 2025-06-11 DIAGNOSIS — J34.2 DEVIATED NASAL SEPTUM: ICD-10-CM

## 2025-06-11 DIAGNOSIS — R09.81 CHRONIC NASAL CONGESTION: ICD-10-CM

## 2025-06-11 DIAGNOSIS — M06.4 INFLAMMATORY POLYARTHROPATHY (HCC): ICD-10-CM

## 2025-06-11 LAB
25(OH)D3 SERPL-MCNC: 31.5 NG/ML (ref 30–100)
CRP SERPL QL: 7.6 MG/L
ERYTHROCYTE [SEDIMENTATION RATE] IN BLOOD: 21 MM/HOUR (ref 0–29)
RHEUMATOID FACT SERPL-ACNC: <10 IU/ML

## 2025-06-11 PROCEDURE — 85652 RBC SED RATE AUTOMATED: CPT

## 2025-06-11 PROCEDURE — 36415 COLL VENOUS BLD VENIPUNCTURE: CPT

## 2025-06-11 PROCEDURE — 86431 RHEUMATOID FACTOR QUANT: CPT

## 2025-06-11 PROCEDURE — 82306 VITAMIN D 25 HYDROXY: CPT

## 2025-06-11 PROCEDURE — 86038 ANTINUCLEAR ANTIBODIES: CPT

## 2025-06-11 PROCEDURE — 86225 DNA ANTIBODY NATIVE: CPT

## 2025-06-11 PROCEDURE — 86140 C-REACTIVE PROTEIN: CPT

## 2025-06-11 PROCEDURE — 86618 LYME DISEASE ANTIBODY: CPT

## 2025-06-11 PROCEDURE — 70486 CT MAXILLOFACIAL W/O DYE: CPT

## 2025-06-12 ENCOUNTER — TELEPHONE (OUTPATIENT)
Age: 65
End: 2025-06-12

## 2025-06-12 LAB — B BURGDOR IGG+IGM SER QL IA: NEGATIVE

## 2025-06-12 NOTE — TELEPHONE ENCOUNTER
Pt calling wanting to schedule ov w/ Dr. De Leon. Pt has a new pt appt w/ Dr. Mayen on 8/22/25 but prefers Dr. De Leon. I could not find sooner w/ Dr. De Leon, pt decided to keep her appt w/ Dr. Mayen.

## 2025-06-16 LAB
DSDNA IGG SERPL IA-ACNC: <0.9 IU/ML (ref ?–15)
NUCLEAR IGG SER IA-RTO: 0.2 RATIO (ref ?–1)

## 2025-07-25 ENCOUNTER — APPOINTMENT (OUTPATIENT)
Dept: LAB | Age: 65
End: 2025-07-25
Attending: FAMILY MEDICINE
Payer: COMMERCIAL

## 2025-07-25 DIAGNOSIS — G89.29 OTHER CHRONIC PAIN: ICD-10-CM

## 2025-07-25 DIAGNOSIS — M25.561 ACUTE PAIN OF RIGHT KNEE: ICD-10-CM

## 2025-07-25 LAB
CRP SERPL QL: 7.5 MG/L
EST. AVERAGE GLUCOSE BLD GHB EST-MCNC: 137 MG/DL
HBA1C MFR BLD: 6.4 %

## 2025-07-25 PROCEDURE — 86038 ANTINUCLEAR ANTIBODIES: CPT

## 2025-07-25 PROCEDURE — 86225 DNA ANTIBODY NATIVE: CPT

## 2025-07-25 PROCEDURE — 86200 CCP ANTIBODY: CPT

## 2025-07-25 PROCEDURE — 86235 NUCLEAR ANTIGEN ANTIBODY: CPT

## 2025-07-25 PROCEDURE — 86140 C-REACTIVE PROTEIN: CPT

## 2025-07-25 PROCEDURE — 36415 COLL VENOUS BLD VENIPUNCTURE: CPT

## 2025-07-29 LAB
DSDNA IGG SERPL IA-ACNC: <0.9 IU/ML (ref ?–15)
NUCLEAR IGG SER IA-RTO: 0.2 RATIO (ref ?–1)

## 2025-07-30 LAB
CCP AB SER IA-ACNC: 0.8 (ref ?–10)
ENA SS-A AB SER IA-ACNC: <0.5 U/ML (ref ?–10)
ENA SS-B IGG SER IA-ACNC: <0.6 U/ML (ref ?–10)